# Patient Record
Sex: MALE | Race: WHITE | Employment: OTHER | URBAN - NONMETROPOLITAN AREA
[De-identification: names, ages, dates, MRNs, and addresses within clinical notes are randomized per-mention and may not be internally consistent; named-entity substitution may affect disease eponyms.]

---

## 2017-04-07 ENCOUNTER — HOSPITAL ENCOUNTER (OUTPATIENT)
Dept: GENERAL RADIOLOGY | Age: 47
Discharge: HOME | End: 2017-04-07
Payer: COMMERCIAL

## 2017-04-07 DIAGNOSIS — M54.2 CERVICAL PAIN: ICD-10-CM

## 2017-04-07 PROCEDURE — 72040 X-RAY EXAM NECK SPINE 2-3 VW: CPT

## 2017-06-29 DIAGNOSIS — R52 PAIN: Primary | ICD-10-CM

## 2017-06-29 RX ORDER — PROPRANOLOL HYDROCHLORIDE 10 MG/1
TABLET ORAL
Qty: 90 TABLET | Refills: 5 | Status: SHIPPED | OUTPATIENT
Start: 2017-06-29 | End: 2017-08-11 | Stop reason: SDUPTHER

## 2017-06-29 RX ORDER — HYDROCODONE BITARTRATE AND ACETAMINOPHEN 10; 325 MG/1; MG/1
1 TABLET ORAL EVERY 6 HOURS PRN
Qty: 75 TABLET | Refills: 0 | Status: SHIPPED | OUTPATIENT
Start: 2017-06-29 | End: 2017-07-31 | Stop reason: SDUPTHER

## 2017-06-29 RX ORDER — PROMETHAZINE HYDROCHLORIDE 25 MG/1
TABLET ORAL
Qty: 12 TABLET | Refills: 3 | Status: SHIPPED | OUTPATIENT
Start: 2017-06-29 | End: 2017-08-11 | Stop reason: SDUPTHER

## 2017-07-17 RX ORDER — DIAZEPAM 10 MG/1
1 TABLET ORAL DAILY
Refills: 2 | COMMUNITY
Start: 2017-06-14 | End: 2017-07-17 | Stop reason: SDUPTHER

## 2017-07-17 RX ORDER — DIAZEPAM 10 MG/1
10 TABLET ORAL DAILY
Qty: 30 TABLET | Refills: 2 | Status: SHIPPED | OUTPATIENT
Start: 2017-07-17 | End: 2017-08-11 | Stop reason: SDUPTHER

## 2017-07-31 ENCOUNTER — OFFICE VISIT (OUTPATIENT)
Dept: URGENT CARE | Age: 47
End: 2017-07-31
Payer: COMMERCIAL

## 2017-07-31 VITALS
SYSTOLIC BLOOD PRESSURE: 136 MMHG | OXYGEN SATURATION: 99 % | HEIGHT: 72 IN | RESPIRATION RATE: 16 BRPM | BODY MASS INDEX: 20.59 KG/M2 | WEIGHT: 152 LBS | HEART RATE: 76 BPM | TEMPERATURE: 97.8 F | DIASTOLIC BLOOD PRESSURE: 77 MMHG

## 2017-07-31 DIAGNOSIS — M54.50 ACUTE BILATERAL LOW BACK PAIN WITHOUT SCIATICA: Primary | ICD-10-CM

## 2017-07-31 LAB
ALBUMIN SERPL-MCNC: 4 G/DL (ref 3.5–5.2)
ALP BLD-CCNC: 50 U/L (ref 40–130)
ALT SERPL-CCNC: 23 U/L (ref 5–41)
ANION GAP SERPL CALCULATED.3IONS-SCNC: 13 MMOL/L (ref 7–19)
AST SERPL-CCNC: 32 U/L (ref 5–40)
BILIRUB SERPL-MCNC: 0.5 MG/DL (ref 0.2–1.2)
BUN BLDV-MCNC: 11 MG/DL (ref 6–20)
CALCIUM SERPL-MCNC: 9.2 MG/DL (ref 8.6–10)
CHLORIDE BLD-SCNC: 102 MMOL/L (ref 98–111)
CO2: 27 MMOL/L (ref 22–29)
CREAT SERPL-MCNC: 0.9 MG/DL (ref 0.5–1.2)
GFR NON-AFRICAN AMERICAN: >60
GLUCOSE BLD-MCNC: 98 MG/DL (ref 74–109)
POTASSIUM SERPL-SCNC: 4.3 MMOL/L (ref 3.5–5)
SODIUM BLD-SCNC: 142 MMOL/L (ref 136–145)
TOTAL PROTEIN: 6.8 G/DL (ref 6.6–8.7)

## 2017-07-31 PROCEDURE — 96372 THER/PROPH/DIAG INJ SC/IM: CPT | Performed by: NURSE PRACTITIONER

## 2017-07-31 PROCEDURE — 99213 OFFICE O/P EST LOW 20 MIN: CPT | Performed by: NURSE PRACTITIONER

## 2017-07-31 RX ORDER — KETOROLAC TROMETHAMINE 30 MG/ML
60 INJECTION, SOLUTION INTRAMUSCULAR; INTRAVENOUS ONCE
Status: COMPLETED | OUTPATIENT
Start: 2017-07-31 | End: 2017-07-31

## 2017-07-31 RX ORDER — HYDROCODONE BITARTRATE AND ACETAMINOPHEN 10; 325 MG/1; MG/1
1 TABLET ORAL EVERY 6 HOURS PRN
Qty: 75 TABLET | Refills: 0 | Status: SHIPPED | OUTPATIENT
Start: 2017-07-31 | End: 2017-08-02 | Stop reason: SDUPTHER

## 2017-07-31 RX ORDER — CYCLOBENZAPRINE HCL 5 MG
5 TABLET ORAL 3 TIMES DAILY PRN
Qty: 15 TABLET | Refills: 0 | Status: SHIPPED | OUTPATIENT
Start: 2017-07-31 | End: 2017-08-10

## 2017-07-31 RX ORDER — METHYLPREDNISOLONE 4 MG/1
TABLET ORAL
Qty: 1 KIT | Refills: 0 | Status: SHIPPED | OUTPATIENT
Start: 2017-08-01 | End: 2017-08-06

## 2017-07-31 RX ADMIN — KETOROLAC TROMETHAMINE 60 MG: 30 INJECTION, SOLUTION INTRAMUSCULAR; INTRAVENOUS at 10:24

## 2017-07-31 ASSESSMENT — ENCOUNTER SYMPTOMS: BACK PAIN: 1

## 2017-08-02 RX ORDER — HYDROCODONE BITARTRATE AND ACETAMINOPHEN 10; 325 MG/1; MG/1
1 TABLET ORAL EVERY 6 HOURS PRN
Qty: 75 TABLET | Refills: 0 | Status: SHIPPED | OUTPATIENT
Start: 2017-08-02 | End: 2017-08-11 | Stop reason: SDUPTHER

## 2017-08-11 ENCOUNTER — OFFICE VISIT (OUTPATIENT)
Dept: INTERNAL MEDICINE | Age: 47
End: 2017-08-11
Payer: COMMERCIAL

## 2017-08-11 VITALS
OXYGEN SATURATION: 98 % | HEIGHT: 72 IN | WEIGHT: 145 LBS | HEART RATE: 75 BPM | SYSTOLIC BLOOD PRESSURE: 102 MMHG | DIASTOLIC BLOOD PRESSURE: 66 MMHG | BODY MASS INDEX: 19.64 KG/M2

## 2017-08-11 DIAGNOSIS — R25.1 TREMOR: ICD-10-CM

## 2017-08-11 DIAGNOSIS — M54.50 CHRONIC MIDLINE LOW BACK PAIN WITHOUT SCIATICA: Primary | ICD-10-CM

## 2017-08-11 DIAGNOSIS — F41.9 ANXIETY: ICD-10-CM

## 2017-08-11 DIAGNOSIS — G89.29 CHRONIC MIDLINE LOW BACK PAIN WITHOUT SCIATICA: Primary | ICD-10-CM

## 2017-08-11 PROCEDURE — 99214 OFFICE O/P EST MOD 30 MIN: CPT | Performed by: INTERNAL MEDICINE

## 2017-08-11 RX ORDER — HYDROCODONE BITARTRATE AND ACETAMINOPHEN 10; 325 MG/1; MG/1
1 TABLET ORAL EVERY 6 HOURS PRN
Qty: 75 TABLET | Refills: 0 | Status: SHIPPED | OUTPATIENT
Start: 2017-08-11 | End: 2017-10-03 | Stop reason: SDUPTHER

## 2017-08-11 RX ORDER — DIAZEPAM 10 MG/1
10 TABLET ORAL DAILY
Qty: 30 TABLET | Refills: 2 | Status: SHIPPED | OUTPATIENT
Start: 2017-08-11 | End: 2017-12-11 | Stop reason: SDUPTHER

## 2017-08-11 RX ORDER — PROMETHAZINE HYDROCHLORIDE 25 MG/1
25 TABLET ORAL EVERY 6 HOURS PRN
Qty: 30 TABLET | Refills: 3 | Status: SHIPPED | OUTPATIENT
Start: 2017-08-11 | End: 2017-12-15 | Stop reason: SDUPTHER

## 2017-08-11 RX ORDER — PROPRANOLOL HYDROCHLORIDE 20 MG/1
20 TABLET ORAL 2 TIMES DAILY
Qty: 180 TABLET | Refills: 1 | Status: SHIPPED | OUTPATIENT
Start: 2017-08-11 | End: 2017-12-15 | Stop reason: SDUPTHER

## 2017-08-11 ASSESSMENT — PATIENT HEALTH QUESTIONNAIRE - PHQ9
SUM OF ALL RESPONSES TO PHQ9 QUESTIONS 1 & 2: 0
SUM OF ALL RESPONSES TO PHQ QUESTIONS 1-9: 0
2. FEELING DOWN, DEPRESSED OR HOPELESS: 0
1. LITTLE INTEREST OR PLEASURE IN DOING THINGS: 0

## 2017-08-19 ASSESSMENT — ENCOUNTER SYMPTOMS
SORE THROAT: 0
CONSTIPATION: 0
RHINORRHEA: 0
EYE ITCHING: 0
BLOOD IN STOOL: 0
ABDOMINAL DISTENTION: 0
VOICE CHANGE: 0
NAUSEA: 0
EYE PAIN: 0
PHOTOPHOBIA: 0
EYE DISCHARGE: 0
COLOR CHANGE: 0
SINUS PRESSURE: 0
TROUBLE SWALLOWING: 0
COUGH: 0
SHORTNESS OF BREATH: 0
CHEST TIGHTNESS: 0
WHEEZING: 0
VOMITING: 0
ABDOMINAL PAIN: 0
EYE REDNESS: 0
DIARRHEA: 0
BACK PAIN: 1

## 2017-10-03 RX ORDER — HYDROCODONE BITARTRATE AND ACETAMINOPHEN 10; 325 MG/1; MG/1
1 TABLET ORAL EVERY 6 HOURS PRN
Qty: 75 TABLET | Refills: 0 | Status: SHIPPED | OUTPATIENT
Start: 2017-10-03 | End: 2017-12-27 | Stop reason: SDUPTHER

## 2017-10-03 RX ORDER — HYDROCODONE BITARTRATE AND ACETAMINOPHEN 10; 325 MG/1; MG/1
TABLET ORAL
Qty: 75 TABLET | Refills: 0
Start: 2017-10-03 | End: 2017-12-01 | Stop reason: SDUPTHER

## 2017-10-03 NOTE — TELEPHONE ENCOUNTER
Requested Prescriptions     Pending Prescriptions Disp Refills    HYDROcodone-acetaminophen (NORCO)  MG per tablet 75 tablet 0     Sig: Take 1 tablet by mouth every 6 hours as needed for Pain (2.5 pills daily) .    TERELL

## 2017-10-05 RX ORDER — HYDROCODONE BITARTRATE AND ACETAMINOPHEN 10; 325 MG/1; MG/1
TABLET ORAL
Qty: 75 TABLET | Refills: 0 | Status: SHIPPED | OUTPATIENT
Start: 2017-10-05 | End: 2017-11-02 | Stop reason: SDUPTHER

## 2017-11-02 RX ORDER — HYDROCODONE BITARTRATE AND ACETAMINOPHEN 10; 325 MG/1; MG/1
TABLET ORAL
Qty: 75 TABLET | Refills: 0 | Status: SHIPPED | OUTPATIENT
Start: 2017-11-02 | End: 2018-01-26 | Stop reason: SDUPTHER

## 2017-12-01 RX ORDER — HYDROCODONE BITARTRATE AND ACETAMINOPHEN 10; 325 MG/1; MG/1
TABLET ORAL
Qty: 75 TABLET | Refills: 0 | Status: SHIPPED | OUTPATIENT
Start: 2017-12-01 | End: 2018-03-01 | Stop reason: SDUPTHER

## 2017-12-04 ENCOUNTER — TELEPHONE (OUTPATIENT)
Dept: INTERNAL MEDICINE | Age: 47
End: 2017-12-04

## 2017-12-11 ENCOUNTER — TELEPHONE (OUTPATIENT)
Dept: INTERNAL MEDICINE | Age: 47
End: 2017-12-11

## 2017-12-11 DIAGNOSIS — R25.1 TREMOR: ICD-10-CM

## 2017-12-11 LAB
ALBUMIN SERPL-MCNC: 4.4 G/DL (ref 3.5–5.2)
ALP BLD-CCNC: 45 U/L (ref 40–130)
ALT SERPL-CCNC: 20 U/L (ref 5–41)
ANION GAP SERPL CALCULATED.3IONS-SCNC: 12 MMOL/L (ref 7–19)
AST SERPL-CCNC: 34 U/L (ref 5–40)
BASOPHILS ABSOLUTE: 0.1 K/UL (ref 0–0.2)
BASOPHILS RELATIVE PERCENT: 1 % (ref 0–1)
BILIRUB SERPL-MCNC: 0.4 MG/DL (ref 0.2–1.2)
BUN BLDV-MCNC: 23 MG/DL (ref 6–20)
CALCIUM SERPL-MCNC: 9.1 MG/DL (ref 8.6–10)
CHLORIDE BLD-SCNC: 99 MMOL/L (ref 98–111)
CHOLESTEROL, TOTAL: 121 MG/DL (ref 160–199)
CO2: 30 MMOL/L (ref 22–29)
CREAT SERPL-MCNC: 0.9 MG/DL (ref 0.5–1.2)
EOSINOPHILS ABSOLUTE: 0.2 K/UL (ref 0–0.6)
EOSINOPHILS RELATIVE PERCENT: 2.2 % (ref 0–5)
GFR NON-AFRICAN AMERICAN: >60
GLUCOSE BLD-MCNC: 78 MG/DL (ref 74–109)
HCT VFR BLD CALC: 39.6 % (ref 42–52)
HDLC SERPL-MCNC: 50 MG/DL (ref 55–121)
HEMOGLOBIN: 12.7 G/DL (ref 14–18)
LDL CHOLESTEROL CALCULATED: 55 MG/DL
LYMPHOCYTES ABSOLUTE: 1.8 K/UL (ref 1.1–4.5)
LYMPHOCYTES RELATIVE PERCENT: 24.9 % (ref 20–40)
MCH RBC QN AUTO: 29.9 PG (ref 27–31)
MCHC RBC AUTO-ENTMCNC: 32.1 G/DL (ref 33–37)
MCV RBC AUTO: 93.2 FL (ref 80–94)
MONOCYTES ABSOLUTE: 0.6 K/UL (ref 0–0.9)
MONOCYTES RELATIVE PERCENT: 8.1 % (ref 0–10)
NEUTROPHILS ABSOLUTE: 4.6 K/UL (ref 1.5–7.5)
NEUTROPHILS RELATIVE PERCENT: 63.5 % (ref 50–65)
PDW BLD-RTO: 13.3 % (ref 11.5–14.5)
PLATELET # BLD: 237 K/UL (ref 130–400)
PMV BLD AUTO: 10.3 FL (ref 9.4–12.4)
POTASSIUM SERPL-SCNC: 4.2 MMOL/L (ref 3.5–5)
PROSTATE SPECIFIC ANTIGEN: 0.95 NG/ML (ref 0–4)
RBC # BLD: 4.25 M/UL (ref 4.7–6.1)
SODIUM BLD-SCNC: 141 MMOL/L (ref 136–145)
TOTAL PROTEIN: 6.7 G/DL (ref 6.6–8.7)
TRIGL SERPL-MCNC: 81 MG/DL (ref 0–149)
TSH SERPL DL<=0.05 MIU/L-ACNC: 1.33 UIU/ML (ref 0.27–4.2)
WBC # BLD: 7.2 K/UL (ref 4.8–10.8)

## 2017-12-11 RX ORDER — DIAZEPAM 10 MG/1
10 TABLET ORAL DAILY
Qty: 30 TABLET | Refills: 2 | Status: SHIPPED | OUTPATIENT
Start: 2017-12-11 | End: 2017-12-15 | Stop reason: SDUPTHER

## 2017-12-15 ENCOUNTER — OFFICE VISIT (OUTPATIENT)
Dept: INTERNAL MEDICINE | Age: 47
End: 2017-12-15
Payer: COMMERCIAL

## 2017-12-15 VITALS
OXYGEN SATURATION: 99 % | WEIGHT: 147.5 LBS | HEART RATE: 75 BPM | HEIGHT: 72 IN | DIASTOLIC BLOOD PRESSURE: 68 MMHG | BODY MASS INDEX: 19.98 KG/M2 | SYSTOLIC BLOOD PRESSURE: 100 MMHG

## 2017-12-15 DIAGNOSIS — K21.9 GASTROESOPHAGEAL REFLUX DISEASE WITHOUT ESOPHAGITIS: ICD-10-CM

## 2017-12-15 DIAGNOSIS — Z12.11 ENCOUNTER FOR SCREENING COLONOSCOPY: ICD-10-CM

## 2017-12-15 DIAGNOSIS — M54.50 MIDLINE LOW BACK PAIN WITHOUT SCIATICA, UNSPECIFIED CHRONICITY: ICD-10-CM

## 2017-12-15 DIAGNOSIS — K58.9 IRRITABLE BOWEL SYNDROME WITHOUT DIARRHEA: ICD-10-CM

## 2017-12-15 DIAGNOSIS — Z00.00 ROUTINE HEALTH MAINTENANCE: Primary | ICD-10-CM

## 2017-12-15 DIAGNOSIS — D64.9 ANEMIA, UNSPECIFIED TYPE: ICD-10-CM

## 2017-12-15 DIAGNOSIS — R25.1 TREMOR: ICD-10-CM

## 2017-12-15 PROCEDURE — 99214 OFFICE O/P EST MOD 30 MIN: CPT | Performed by: INTERNAL MEDICINE

## 2017-12-15 RX ORDER — PROMETHAZINE HYDROCHLORIDE 25 MG/1
25 TABLET ORAL EVERY 6 HOURS PRN
Qty: 30 TABLET | Refills: 3 | Status: SHIPPED | OUTPATIENT
Start: 2017-12-15 | End: 2018-08-24 | Stop reason: SDUPTHER

## 2017-12-15 RX ORDER — DIAZEPAM 10 MG/1
10 TABLET ORAL 2 TIMES DAILY PRN
Qty: 60 TABLET | Refills: 1 | Status: SHIPPED | OUTPATIENT
Start: 2017-12-15 | End: 2018-02-15 | Stop reason: SDUPTHER

## 2017-12-15 RX ORDER — PROPRANOLOL HYDROCHLORIDE 20 MG/1
20 TABLET ORAL 2 TIMES DAILY
Qty: 180 TABLET | Refills: 1 | Status: SHIPPED | OUTPATIENT
Start: 2017-12-15 | End: 2018-08-24 | Stop reason: SDUPTHER

## 2017-12-15 ASSESSMENT — PATIENT HEALTH QUESTIONNAIRE - PHQ9
1. LITTLE INTEREST OR PLEASURE IN DOING THINGS: 0
2. FEELING DOWN, DEPRESSED OR HOPELESS: 0
SUM OF ALL RESPONSES TO PHQ9 QUESTIONS 1 & 2: 0
SUM OF ALL RESPONSES TO PHQ QUESTIONS 1-9: 0

## 2017-12-15 ASSESSMENT — ENCOUNTER SYMPTOMS
NAUSEA: 0
COLOR CHANGE: 0
COUGH: 0
VOICE CHANGE: 0
CONSTIPATION: 0
PHOTOPHOBIA: 0
EYE DISCHARGE: 0
DIARRHEA: 0
BACK PAIN: 1
SHORTNESS OF BREATH: 0
RHINORRHEA: 0
EYE PAIN: 0
ABDOMINAL DISTENTION: 0
BLOOD IN STOOL: 0
SORE THROAT: 0
ABDOMINAL PAIN: 0
WHEEZING: 0
SINUS PRESSURE: 0
CHEST TIGHTNESS: 0
TROUBLE SWALLOWING: 0
VOMITING: 0
EYE ITCHING: 0
EYE REDNESS: 0

## 2017-12-15 NOTE — PROGRESS NOTES
Chief Complaint   Patient presents with    Annual Exam       HPI: Wes James is a 50 y.o. male is here for His annual physical exam.    His functional status is good. He denies any history of falls. He has no concerns related to safety, hearing, or cognition. His major concern today is a history of essential tremor. He is now working for a [employer]. He is a  and is very intricate work. He states that the tremor does affect his ability to work. He notices that when he takes his Valium along with the propanolol, the shakiness completely resolves. He normally takes his diet just once a day for anxiety. However, he has been taking it twice a day secondary to the tremor. He wants to know if he could have an increase in the Valium. His anxiety is well controlled. He does have a history of chronic low back pain. His medications are working well for the back pain. His cholesterol is good at 121. He is slightly anemic on his lab work today. Educations are working well for acid reflux. He denies any history of blood in the stools. He is on domperidone for history of IBS. Routine screening is as follows:  Vision exam yearly  Declines a flu vaccine  PSA yearly  He will turn 48 in March.  We will refer him for colonoscopy    Past Medical History:   Diagnosis Date    Allergic rhinitis     Cellulitis     Gastroparesis     GERD (gastroesophageal reflux disease)       Past Surgical History:   Procedure Laterality Date    CHOLECYSTECTOMY        Social History     Social History    Marital status:      Spouse name: N/A    Number of children: N/A    Years of education: N/A     Social History Main Topics    Smoking status: Former Smoker     Types: Cigarettes     Quit date: 12/15/1990    Smokeless tobacco: Never Used    Alcohol use No    Drug use: No    Sexual activity: Not on file     Other Topics Concern    Not on file     Social History Narrative    No narrative on file      No family history pain, discharge, redness, itching and visual disturbance. Respiratory: Negative for cough, chest tightness, shortness of breath and wheezing. Cardiovascular: Negative for chest pain, palpitations and leg swelling. Gastrointestinal: Negative for abdominal distention, abdominal pain, blood in stool, constipation, diarrhea, nausea and vomiting. Endocrine: Negative for cold intolerance, heat intolerance, polydipsia, polyphagia and polyuria. Genitourinary: Negative for difficulty urinating, dysuria, enuresis, frequency, hematuria and urgency. Musculoskeletal: Positive for back pain and neck pain. Chronic low back and neck pain   Skin: Negative for color change, pallor, rash and wound. Allergic/Immunologic: Negative for environmental allergies, food allergies and immunocompromised state. Neurological: Positive for tremors. Negative for dizziness, syncope, speech difficulty, weakness, light-headedness, numbness and headaches. Psychiatric/Behavioral: Negative for agitation, behavioral problems, confusion, decreased concentration, dysphoric mood, hallucinations, self-injury, sleep disturbance and suicidal ideas. The patient is nervous/anxious. The patient is not hyperactive. Anxiety is stable       /68   Pulse 75   Ht 6' (1.829 m)   Wt 147 lb 8 oz (66.9 kg)   SpO2 99%   BMI 20.00 kg/m²   Physical Exam   Constitutional: He is oriented to person, place, and time. He appears well-developed and well-nourished. No distress. HENT:   Head: Normocephalic and atraumatic. Right Ear: External ear normal.   Left Ear: External ear normal.   Nose: Nose normal.   Mouth/Throat: Oropharynx is clear and moist. No oropharyngeal exudate. Eyes: Conjunctivae and EOM are normal. Pupils are equal, round, and reactive to light. Right eye exhibits no discharge. Left eye exhibits no discharge. No scleral icterus. Neck: Normal range of motion. Neck supple. No JVD present.  No tracheal deviation present. No thyromegaly present. Cardiovascular: Normal rate, regular rhythm, normal heart sounds and intact distal pulses. Exam reveals no gallop and no friction rub. No murmur heard. Pulmonary/Chest: Effort normal and breath sounds normal. No respiratory distress. He has no wheezes. He has no rales. He exhibits no tenderness. Abdominal: Soft. Bowel sounds are normal. He exhibits no distension and no mass. There is no tenderness. There is no rebound and no guarding. Musculoskeletal: Normal range of motion. He exhibits tenderness. He exhibits no edema or deformity. Gait antalgic. There is tenderness over the lower lumbar spine   Lymphadenopathy:     He has no cervical adenopathy. Neurological: He is alert and oriented to person, place, and time. He has normal reflexes. He displays normal reflexes. No cranial nerve deficit. He exhibits normal muscle tone. Coordination normal.   Tremor to hands noted   Skin: Skin is warm and dry. No rash noted. He is not diaphoretic. No erythema. No pallor. Psychiatric: He has a normal mood and affect. His behavior is normal. Judgment and thought content normal.   Nursing note and vitals reviewed. 1. Encounter for screening colonoscopy    2. Anemia, unspecified type        ASSESSMENT/PLAN:    64-year-old male here for annual physical exam    1. Health maintenance: Routine screening is as per HPI. Labs were discussed with patient today. 2. Tremor: Continue Inderal. We can actually increase the Valium to 10 mg twice daily. He states that the Valium does not make him sleepy or interfere with his work. 3. Chronic low back pain: Continue hydrocodone as prescribed    4. GERD: Stable on omeprazole and Pepcid    5. IBS: Continue domperidone    6. Anemia: Uncertain etiology. Check CBC and iron studies in a month.     Karina Angelo was seen today for annual exam.    Diagnoses and all orders for this visit:    Encounter for screening colonoscopy  -     Ambulatory referral to Gastroenterology    Anemia, unspecified type  -     CBC; Future  -     Iron and TIBC; Future    Other orders  -     promethazine (PHENERGAN) 25 MG tablet; Take 1 tablet by mouth every 6 hours as needed for Nausea  -     propranolol (INDERAL) 20 MG tablet; Take 1 tablet by mouth 2 times daily  -     diazepam (VALIUM) 10 MG tablet; Take 1 tablet by mouth 2 times daily as needed for Anxiety . Return in about 4 months (around 4/15/2018) for medication check. Orders Placed This Encounter   Procedures    CBC     Standing Status:   Future     Standing Expiration Date:   2/15/2018    Iron and TIBC     Standing Status:   Future     Standing Expiration Date:   2/15/2018     Order Specific Question:   Is Patient Fasting? Answer:   yes     Order Specific Question:   No of Hours?      Answer:   15    Ambulatory referral to Gastroenterology     Referral Priority:   Routine     Referral Type:   Consult for Advice and Opinion     Requested Specialty:   Gastroenterology     Number of Visits Requested:   Jaja Sauceda MD

## 2017-12-15 NOTE — PATIENT INSTRUCTIONS
hours after meals. But you may need to take iron with food to avoid an upset stomach. ¨ Do not take antacids or drink milk or caffeine drinks (such as coffee, tea, or cola) at the same time or within 2 hours of the time that you take your iron. They can make it hard for your body to absorb the iron. ¨ Vitamin C (from food or supplements) helps your body absorb iron. Try taking iron pills with a glass of orange juice or some other food that is high in vitamin C, such as citrus fruits. ¨ Iron pills may cause stomach problems, such as heartburn, nausea, diarrhea, constipation, and cramps. Be sure to drink plenty of fluids, and include fruits, vegetables, and fiber in your diet each day. Iron pills often make your bowel movements dark or green. ¨ If you forget to take an iron pill, do not take a double dose of iron the next time you take a pill. ¨ Keep iron pills out of the reach of small children. An overdose of iron can be very dangerous. · Follow your doctor's advice about eating iron-rich foods. These include red meat, shellfish, poultry, eggs, beans, raisins, whole-grain bread, and leafy green vegetables. · Steam vegetables to help them keep their iron content. When should you call for help? Call 911 anytime you think you may need emergency care. For example, call if:  ? · You have symptoms of a heart attack. These may include:  ¨ Chest pain or pressure, or a strange feeling in the chest.  ¨ Sweating. ¨ Shortness of breath. ¨ Nausea or vomiting. ¨ Pain, pressure, or a strange feeling in the back, neck, jaw, or upper belly or in one or both shoulders or arms. ¨ Lightheadedness or sudden weakness. ¨ A fast or irregular heartbeat. After you call 911, the  may tell you to chew 1 adult-strength or 2 to 4 low-dose aspirin. Wait for an ambulance. Do not try to drive yourself. ? · You passed out (lost consciousness).    ?Call your doctor now or seek immediate medical care if:  ? · You have new or

## 2018-01-26 RX ORDER — HYDROCODONE BITARTRATE AND ACETAMINOPHEN 10; 325 MG/1; MG/1
TABLET ORAL
Qty: 75 TABLET | Refills: 0 | Status: SHIPPED | OUTPATIENT
Start: 2018-01-26 | End: 2019-11-19 | Stop reason: SDUPTHER

## 2018-02-15 RX ORDER — DIAZEPAM 10 MG/1
10 TABLET ORAL 2 TIMES DAILY PRN
Qty: 60 TABLET | Refills: 1 | Status: SHIPPED | OUTPATIENT
Start: 2018-02-15 | End: 2018-04-16 | Stop reason: SDUPTHER

## 2018-03-01 RX ORDER — HYDROCODONE BITARTRATE AND ACETAMINOPHEN 10; 325 MG/1; MG/1
TABLET ORAL
Qty: 90 TABLET | Refills: 0 | Status: SHIPPED | OUTPATIENT
Start: 2018-03-01 | End: 2018-04-20 | Stop reason: SDUPTHER

## 2018-03-23 RX ORDER — HYDROCODONE BITARTRATE AND ACETAMINOPHEN 10; 325 MG/1; MG/1
1 TABLET ORAL EVERY 6 HOURS PRN
Qty: 75 TABLET | Refills: 0 | Status: SHIPPED | OUTPATIENT
Start: 2018-03-23 | End: 2018-05-23 | Stop reason: SDUPTHER

## 2018-04-17 RX ORDER — DIAZEPAM 10 MG/1
10 TABLET ORAL 2 TIMES DAILY PRN
Qty: 60 TABLET | Refills: 1 | Status: SHIPPED | OUTPATIENT
Start: 2018-04-17 | End: 2018-04-20 | Stop reason: SDUPTHER

## 2018-04-17 RX ORDER — HYDROCODONE BITARTRATE AND ACETAMINOPHEN 10; 325 MG/1; MG/1
TABLET ORAL
Qty: 20 TABLET | Refills: 0 | Status: SHIPPED | OUTPATIENT
Start: 2018-04-17 | End: 2019-11-19 | Stop reason: SDUPTHER

## 2018-04-20 ENCOUNTER — OFFICE VISIT (OUTPATIENT)
Dept: INTERNAL MEDICINE | Age: 48
End: 2018-04-20
Payer: COMMERCIAL

## 2018-04-20 VITALS
OXYGEN SATURATION: 99 % | DIASTOLIC BLOOD PRESSURE: 82 MMHG | BODY MASS INDEX: 19.57 KG/M2 | HEIGHT: 72 IN | SYSTOLIC BLOOD PRESSURE: 138 MMHG | WEIGHT: 144.5 LBS | HEART RATE: 71 BPM

## 2018-04-20 DIAGNOSIS — H61.21 IMPACTED CERUMEN OF RIGHT EAR: ICD-10-CM

## 2018-04-20 DIAGNOSIS — R25.1 TREMOR: ICD-10-CM

## 2018-04-20 DIAGNOSIS — Z12.11 ENCOUNTER FOR SCREENING COLONOSCOPY: Primary | ICD-10-CM

## 2018-04-20 DIAGNOSIS — K21.9 GASTROESOPHAGEAL REFLUX DISEASE WITHOUT ESOPHAGITIS: ICD-10-CM

## 2018-04-20 DIAGNOSIS — M54.9 BACK PAIN, UNSPECIFIED BACK LOCATION, UNSPECIFIED BACK PAIN LATERALITY, UNSPECIFIED CHRONICITY: ICD-10-CM

## 2018-04-20 PROCEDURE — 99214 OFFICE O/P EST MOD 30 MIN: CPT | Performed by: INTERNAL MEDICINE

## 2018-04-20 RX ORDER — HYDROCODONE BITARTRATE AND ACETAMINOPHEN 10; 325 MG/1; MG/1
TABLET ORAL
Qty: 90 TABLET | Refills: 0 | Status: SHIPPED | OUTPATIENT
Start: 2018-04-20 | End: 2018-07-20 | Stop reason: SDUPTHER

## 2018-04-20 RX ORDER — DIAZEPAM 10 MG/1
10 TABLET ORAL 2 TIMES DAILY PRN
Qty: 60 TABLET | Refills: 1 | Status: SHIPPED | OUTPATIENT
Start: 2018-04-20 | End: 2018-06-13 | Stop reason: SDUPTHER

## 2018-04-23 ASSESSMENT — ENCOUNTER SYMPTOMS
WHEEZING: 0
VOICE CHANGE: 0
BACK PAIN: 1
ABDOMINAL PAIN: 0
SHORTNESS OF BREATH: 0
TROUBLE SWALLOWING: 0
SINUS PRESSURE: 0
CHEST TIGHTNESS: 0
EYE DISCHARGE: 0
EYE PAIN: 0
COUGH: 0
CONSTIPATION: 0
DIARRHEA: 0
EYE REDNESS: 0
SORE THROAT: 0
PHOTOPHOBIA: 0
BLOOD IN STOOL: 0
NAUSEA: 0
COLOR CHANGE: 0
ABDOMINAL DISTENTION: 0
RHINORRHEA: 0
EYE ITCHING: 0
VOMITING: 0

## 2018-04-26 ENCOUNTER — TELEPHONE (OUTPATIENT)
Dept: GASTROENTEROLOGY | Age: 48
End: 2018-04-26

## 2018-05-24 RX ORDER — HYDROCODONE BITARTRATE AND ACETAMINOPHEN 10; 325 MG/1; MG/1
1 TABLET ORAL EVERY 6 HOURS PRN
Qty: 75 TABLET | Refills: 0 | Status: SHIPPED | OUTPATIENT
Start: 2018-05-24 | End: 2018-06-18 | Stop reason: SDUPTHER

## 2018-06-13 DIAGNOSIS — F41.9 ANXIETY: Primary | ICD-10-CM

## 2018-06-13 RX ORDER — DIAZEPAM 10 MG/1
10 TABLET ORAL 2 TIMES DAILY PRN
Qty: 60 TABLET | Refills: 1 | Status: SHIPPED | OUTPATIENT
Start: 2018-06-13 | End: 2018-08-10 | Stop reason: SDUPTHER

## 2018-06-18 DIAGNOSIS — M54.5 LOW BACK PAIN, UNSPECIFIED BACK PAIN LATERALITY, UNSPECIFIED CHRONICITY, WITH SCIATICA PRESENCE UNSPECIFIED: Primary | ICD-10-CM

## 2018-06-18 RX ORDER — HYDROCODONE BITARTRATE AND ACETAMINOPHEN 10; 325 MG/1; MG/1
1 TABLET ORAL EVERY 6 HOURS PRN
Qty: 75 TABLET | Refills: 0 | Status: SHIPPED | OUTPATIENT
Start: 2018-06-18 | End: 2018-08-24 | Stop reason: SDUPTHER

## 2018-06-18 NOTE — TELEPHONE ENCOUNTER
Keena Timmons called requesting a refill of the below medication which has been pended for you:     Requested Prescriptions     Pending Prescriptions Disp Refills    HYDROcodone-acetaminophen (NORCO)  MG per tablet 75 tablet 0     Sig: Take 1 tablet by mouth every 6 hours as needed for Pain (2.5 pills daily). .       Last Appointment Date: 4/20/2018  Next Appointment Date: 8/24/2018    No Known Allergies

## 2018-07-20 ENCOUNTER — TELEPHONE (OUTPATIENT)
Dept: INTERNAL MEDICINE | Age: 48
End: 2018-07-20

## 2018-07-20 DIAGNOSIS — G89.29 OTHER CHRONIC PAIN: Primary | ICD-10-CM

## 2018-07-20 RX ORDER — HYDROCODONE BITARTRATE AND ACETAMINOPHEN 10; 325 MG/1; MG/1
TABLET ORAL
Qty: 90 TABLET | Refills: 0 | Status: SHIPPED | OUTPATIENT
Start: 2018-07-20 | End: 2018-09-12

## 2018-08-01 ENCOUNTER — ANESTHESIA EVENT (OUTPATIENT)
Dept: ENDOSCOPY | Age: 48
End: 2018-08-01
Payer: COMMERCIAL

## 2018-08-02 ENCOUNTER — HOSPITAL ENCOUNTER (OUTPATIENT)
Age: 48
Setting detail: OUTPATIENT SURGERY
Discharge: HOME | End: 2018-08-02
Attending: INTERNAL MEDICINE | Admitting: INTERNAL MEDICINE
Payer: COMMERCIAL

## 2018-08-02 ENCOUNTER — ANESTHESIA (OUTPATIENT)
Dept: ENDOSCOPY | Age: 48
End: 2018-08-02
Payer: COMMERCIAL

## 2018-08-02 VITALS
DIASTOLIC BLOOD PRESSURE: 53 MMHG | RESPIRATION RATE: 37 BRPM | SYSTOLIC BLOOD PRESSURE: 91 MMHG | OXYGEN SATURATION: 99 %

## 2018-08-02 VITALS
WEIGHT: 150 LBS | SYSTOLIC BLOOD PRESSURE: 100 MMHG | HEIGHT: 60 IN | BODY MASS INDEX: 29.45 KG/M2 | OXYGEN SATURATION: 99 % | HEART RATE: 88 BPM | TEMPERATURE: 97.5 F | RESPIRATION RATE: 16 BRPM | DIASTOLIC BLOOD PRESSURE: 56 MMHG

## 2018-08-02 PROCEDURE — 2580000003 HC RX 258: Performed by: INTERNAL MEDICINE

## 2018-08-02 PROCEDURE — 3609009500 HC COLONOSCOPY DIAGNOSTIC OR SCREENING: Performed by: INTERNAL MEDICINE

## 2018-08-02 PROCEDURE — 45378 DIAGNOSTIC COLONOSCOPY: CPT | Performed by: INTERNAL MEDICINE

## 2018-08-02 PROCEDURE — 7100000011 HC PHASE II RECOVERY - ADDTL 15 MIN: Performed by: INTERNAL MEDICINE

## 2018-08-02 PROCEDURE — 3700000001 HC ADD 15 MINUTES (ANESTHESIA): Performed by: INTERNAL MEDICINE

## 2018-08-02 PROCEDURE — 3700000000 HC ANESTHESIA ATTENDED CARE: Performed by: INTERNAL MEDICINE

## 2018-08-02 PROCEDURE — 6360000002 HC RX W HCPCS: Performed by: NURSE ANESTHETIST, CERTIFIED REGISTERED

## 2018-08-02 PROCEDURE — 2500000003 HC RX 250 WO HCPCS: Performed by: NURSE ANESTHETIST, CERTIFIED REGISTERED

## 2018-08-02 PROCEDURE — 7100000010 HC PHASE II RECOVERY - FIRST 15 MIN: Performed by: INTERNAL MEDICINE

## 2018-08-02 RX ORDER — LIDOCAINE HYDROCHLORIDE 10 MG/ML
1 INJECTION, SOLUTION EPIDURAL; INFILTRATION; INTRACAUDAL; PERINEURAL ONCE
Status: DISCONTINUED | OUTPATIENT
Start: 2018-08-02 | End: 2018-08-02 | Stop reason: HOSPADM

## 2018-08-02 RX ORDER — LIDOCAINE HYDROCHLORIDE 20 MG/ML
INJECTION, SOLUTION INFILTRATION; PERINEURAL PRN
Status: DISCONTINUED | OUTPATIENT
Start: 2018-08-02 | End: 2018-08-02 | Stop reason: SDUPTHER

## 2018-08-02 RX ORDER — FENTANYL CITRATE 50 UG/ML
INJECTION, SOLUTION INTRAMUSCULAR; INTRAVENOUS PRN
Status: DISCONTINUED | OUTPATIENT
Start: 2018-08-02 | End: 2018-08-02 | Stop reason: SDUPTHER

## 2018-08-02 RX ORDER — SODIUM CHLORIDE, SODIUM LACTATE, POTASSIUM CHLORIDE, CALCIUM CHLORIDE 600; 310; 30; 20 MG/100ML; MG/100ML; MG/100ML; MG/100ML
INJECTION, SOLUTION INTRAVENOUS CONTINUOUS
Status: DISCONTINUED | OUTPATIENT
Start: 2018-08-02 | End: 2018-08-02 | Stop reason: HOSPADM

## 2018-08-02 RX ORDER — MIDAZOLAM HYDROCHLORIDE 1 MG/ML
INJECTION INTRAMUSCULAR; INTRAVENOUS PRN
Status: DISCONTINUED | OUTPATIENT
Start: 2018-08-02 | End: 2018-08-02 | Stop reason: SDUPTHER

## 2018-08-02 RX ORDER — PROPOFOL 10 MG/ML
INJECTION, EMULSION INTRAVENOUS PRN
Status: DISCONTINUED | OUTPATIENT
Start: 2018-08-02 | End: 2018-08-02 | Stop reason: SDUPTHER

## 2018-08-02 RX ADMIN — SODIUM CHLORIDE, POTASSIUM CHLORIDE, SODIUM LACTATE AND CALCIUM CHLORIDE: 600; 310; 30; 20 INJECTION, SOLUTION INTRAVENOUS at 10:10

## 2018-08-02 RX ADMIN — PROPOFOL 200 MG: 10 INJECTION, EMULSION INTRAVENOUS at 10:45

## 2018-08-02 RX ADMIN — MIDAZOLAM 2 MG: 1 INJECTION INTRAMUSCULAR; INTRAVENOUS at 10:42

## 2018-08-02 RX ADMIN — FENTANYL CITRATE 50 MCG: 50 INJECTION INTRAMUSCULAR; INTRAVENOUS at 10:45

## 2018-08-02 RX ADMIN — LIDOCAINE HYDROCHLORIDE 40 MG: 20 INJECTION, SOLUTION INFILTRATION; PERINEURAL at 10:45

## 2018-08-02 ASSESSMENT — PAIN - FUNCTIONAL ASSESSMENT: PAIN_FUNCTIONAL_ASSESSMENT: 0-10

## 2018-08-02 NOTE — ANESTHESIA PRE PROCEDURE
Department of Anesthesiology  Preprocedure Note       Name:  Daniel Soares   Age:  46 y.o.  :  1970                                          MRN:  [id number]         Date:  2018      Surgeon: Jared Quarles):  Raji Yanez MD    Procedure: Procedure(s):  COLONOSCOPY DIAGNOSTIC OR SCREENING    Medications prior to admission:   Prior to Admission medications    Medication Sig Start Date End Date Taking? Authorizing Provider   HYDROcodone-acetaminophen (NORCO)  MG per tablet Take 1 tablet by mouth every 6 hours as needed for Pain (2.5 pills daily). Hany Oliver Date: 18 Yes Leno Walters MD   diazepam (VALIUM) 10 MG tablet Take 1 tablet by mouth 2 times daily as needed for Anxiety. . 18 Yes Leno Walters MD   promethazine (PHENERGAN) 25 MG tablet Take 1 tablet by mouth every 6 hours as needed for Nausea 12/15/17  Yes Leno Walters MD   propranolol (INDERAL) 20 MG tablet Take 1 tablet by mouth 2 times daily 12/15/17  Yes Leno Walters MD   omeprazole (PRILOSEC) 20 MG capsule Take 20 mg by mouth 2 times daily. Yes Historical Provider, MD   HYDROcodone-acetaminophen (NORCO)  MG per tablet TAKE 1 TABLET BY MOUTH EVERY 6 HOURS AS NEEDED FOR PAIN (MAX OF 2 AND 1/2 PILLS DAILY) . Weston Afu[place]yla Earliest Fill Date: 18  Leno Walters MD   PROPRANOLOL HCL by Does not apply route. Historical Provider, MD   loratadine (CLARITIN) 10 MG tablet Take 10 mg by mouth daily. Historical Provider, MD   Multiple Vitamin (MULTI-VITAMIN PO) Take  by mouth.       Historical Provider, MD       Current medications:    Current Facility-Administered Medications   Medication Dose Route Frequency Provider Last Rate Last Dose    lactated ringers infusion   Intravenous Continuous Raji Yanez  mL/hr at 18 1010      lidocaine PF 1 % injection 1 mL  1 mL Intradermal Once Raji Yanez MD           Allergies:  No Known Allergies    Problem List: There is no problem list on file for this patient. Past Medical History:        Diagnosis Date    Allergic rhinitis     Cellulitis     Gastroparesis     GERD (gastroesophageal reflux disease)        Past Surgical History:        Procedure Laterality Date    CHOLECYSTECTOMY         Social History:    Social History   Substance Use Topics    Smoking status: Former Smoker     Types: Cigarettes     Quit date: 12/15/1990    Smokeless tobacco: Never Used    Alcohol use No                                Counseling given: Not Answered      Vital Signs (Current):   Vitals:    08/02/18 1001   BP: 96/62   Pulse: 101   Resp: 18   Temp: 98.4 °F (36.9 °C)   TempSrc: Temporal   SpO2: 99%   Weight: 150 lb (68 kg)   Height: (!) 6\" (0.152 m)                                              BP Readings from Last 3 Encounters:   08/02/18 96/62   04/20/18 138/82   12/15/17 100/68       NPO Status: Time of last liquid consumption: 0700                        Time of last solid consumption: 1800                        Date of last liquid consumption: 08/02/18                        Date of last solid food consumption: 07/31/18    BMI:   Wt Readings from Last 3 Encounters:   08/02/18 150 lb (68 kg)   04/20/18 144 lb 8 oz (65.5 kg)   12/15/17 147 lb 8 oz (66.9 kg)     Body mass index is 2,929.49 kg/m².     CBC:   Lab Results   Component Value Date    WBC 7.2 12/11/2017    RBC 4.25 12/11/2017    HGB 12.7 12/11/2017    HCT 39.6 12/11/2017    MCV 93.2 12/11/2017    RDW 13.3 12/11/2017     12/11/2017       CMP:   Lab Results   Component Value Date     12/11/2017    K 4.2 12/11/2017    CL 99 12/11/2017    CO2 30 12/11/2017    BUN 23 12/11/2017    CREATININE 0.9 12/11/2017    LABGLOM >60 12/11/2017    GLUCOSE 78 12/11/2017    PROT 6.7 12/11/2017    CALCIUM 9.1 12/11/2017    BILITOT 0.4 12/11/2017    ALKPHOS 45 12/11/2017    AST 34 12/11/2017    ALT 20 12/11/2017       POC Tests: No results for input(s): POCGLU, POCNA, POCK, POCCL, POCBUN, POCHEMO, POCHCT in the last 72 hours. Coags: No results found for: PROTIME, INR, APTT    HCG (If Applicable): No results found for: PREGTESTUR, PREGSERUM, HCG, HCGQUANT     ABGs: No results found for: PHART, PO2ART, OGR2QIG, EYQ6NUE, BEART, F9PJXDBZ     Type & Screen (If Applicable):  No results found for: LABABO, [address]    Anesthesia Evaluation  Patient summary reviewed and Nursing notes reviewed  Airway: Mallampati: I  TM distance: >3 FB   Neck ROM: full  Mouth opening: > = 3 FB Dental:          Pulmonary:Negative Pulmonary ROS and normal exam                               Cardiovascular:Negative CV ROS  Exercise tolerance: good (>4 METS),                     Neuro/Psych:   (+) depression/anxiety              ROS comment: Essential Tremors (takes propranolol) GI/Hepatic/Renal:   (+) GERD: well controlled,           Endo/Other:                C-spine cleared    Pt had no PAT visit       Abdominal:           Vascular: negative vascular ROS. Anesthesia Plan      general     ASA 2       Induction: intravenous. Anesthetic plan and risks discussed with patient and spouse.                       Cindy Lara, APRN - CRNA   8/2/2018

## 2018-08-02 NOTE — OP NOTE
Patient: Maryanne Matthews : 1970  Med Rec#: [id number] Acc#: [id number]   Primary Care Provider Lucian Selby MD    Date of Procedure:  2018    Endoscopist: Catarino Gracia MD    Referring Provider: Lucian Selby MD,     Operation Performed: Colonoscopy     Indications: screening    Anesthesia:  Sedation was administered by anesthesia who monitored the patient during the procedure. I met with Maryanne Matthews prior to procedure. We discussed the procedure itself, and I have discussed the risks of endoscopy (including-- but not limited to-- pain, discomfort, bleeding potentially requiring second endoscopic procedure and/or blood transfusion, organ perforation requiring operative repair, damage to organs near the colon, infection, aspiration, cardiopulmonary/allergic reaction), benefits, indications to endoscopy. Additionally, we discussed options other than colonoscopy. The patient expressed understanding. All questions answered. The patient decided to proceed with the procedure. Signed informed consent was placed on the chart. Blood Loss: minimal    Withdrawal time: > 6 minutes  Bowel Prep: adequate     Complications: no immediate complications    DESCRIPTION OF PROCEDURE:     A time out was performed. After written informed consent was obtained, the patient was placed in the left lateral position. The perianal area was inspected, and a digital rectal exam was performed. A rectal exam was performed: normal tone, no palpable lesions. At this point, a forward viewing Olympus colonoscope was inserted into the anus and carefully advanced to the cecum. The cecum was identified by the ileocecal valve and the appendiceal orifice. The colonoscope was then slowly withdrawn with careful inspection of the mucosa in a linear and circumferential fashion. The scope was retroflexed in the rectum. Suction was utilized during the procedure to remove as much air as possible from the bowel.  The colonoscope was

## 2018-08-02 NOTE — H&P
Historical Provider, MD       Past Medical History:  Past Medical History:   Diagnosis Date    Allergic rhinitis     Cellulitis     Gastroparesis     GERD (gastroesophageal reflux disease)        Past Surgical History:  Past Surgical History:   Procedure Laterality Date    CHOLECYSTECTOMY         Social History:  Social History   Substance Use Topics    Smoking status: Former Smoker     Types: Cigarettes     Quit date: 12/15/1990    Smokeless tobacco: Never Used    Alcohol use No       Vital Signs:   Vitals:    08/02/18 1001   BP: 96/62   Pulse: 101   Resp: 18   Temp: 98.4 °F (36.9 °C)   SpO2: 99%        Physical Exam:  Cardiac:  [x]WNL  []Comments:  Pulmonary:  [x]WNL   []Comments:  Neuro/Mental Status:  [x]WNL  []Comments:  Abdominal:  [x]WNL    []Comments:  Other:   []WNL  []Comments:    Informed Consent:  The risks and benefits of the procedure have been discussed with either the patient or if they cannot consent, their representative. Assessment:  Patient examined and appropriate for planned sedation and procedure. Plan:  Proceed with planned sedation and procedure as above.     Cecil Hernandez MD

## 2018-08-10 DIAGNOSIS — F41.9 ANXIETY: ICD-10-CM

## 2018-08-10 RX ORDER — DIAZEPAM 10 MG/1
10 TABLET ORAL 2 TIMES DAILY PRN
Qty: 60 TABLET | Refills: 1 | Status: SHIPPED | OUTPATIENT
Start: 2018-08-10 | End: 2018-09-12 | Stop reason: SDUPTHER

## 2018-08-10 NOTE — TELEPHONE ENCOUNTER
Ricky Marcelo called requesting a refill of the below medication which has been pended for you:     Requested Prescriptions     Pending Prescriptions Disp Refills    diazepam (VALIUM) 10 MG tablet 60 tablet 1     Sig: Take 1 tablet by mouth 2 times daily as needed for Anxiety. .       Last Appointment Date: 4/20/2018  Next Appointment Date: 8/24/2018    No Known Allergies

## 2018-08-24 ENCOUNTER — OFFICE VISIT (OUTPATIENT)
Dept: INTERNAL MEDICINE | Age: 48
End: 2018-08-24
Payer: COMMERCIAL

## 2018-08-24 VITALS
BODY MASS INDEX: 20.25 KG/M2 | WEIGHT: 149.5 LBS | HEART RATE: 67 BPM | OXYGEN SATURATION: 100 % | SYSTOLIC BLOOD PRESSURE: 112 MMHG | DIASTOLIC BLOOD PRESSURE: 70 MMHG | HEIGHT: 72 IN

## 2018-08-24 DIAGNOSIS — K21.9 GASTROESOPHAGEAL REFLUX DISEASE WITHOUT ESOPHAGITIS: ICD-10-CM

## 2018-08-24 DIAGNOSIS — R25.1 TREMOR: ICD-10-CM

## 2018-08-24 DIAGNOSIS — Z91.09 ENVIRONMENTAL ALLERGIES: ICD-10-CM

## 2018-08-24 DIAGNOSIS — M54.5 LOW BACK PAIN, UNSPECIFIED BACK PAIN LATERALITY, UNSPECIFIED CHRONICITY, WITH SCIATICA PRESENCE UNSPECIFIED: ICD-10-CM

## 2018-08-24 DIAGNOSIS — F41.9 ANXIETY DISORDER, UNSPECIFIED TYPE: Primary | ICD-10-CM

## 2018-08-24 PROCEDURE — 99214 OFFICE O/P EST MOD 30 MIN: CPT | Performed by: INTERNAL MEDICINE

## 2018-08-24 RX ORDER — PROMETHAZINE HYDROCHLORIDE 25 MG/1
25 TABLET ORAL EVERY 6 HOURS PRN
Qty: 30 TABLET | Refills: 3 | Status: SHIPPED | OUTPATIENT
Start: 2018-08-24 | End: 2018-12-14 | Stop reason: SDUPTHER

## 2018-08-24 RX ORDER — HYDROCODONE BITARTRATE AND ACETAMINOPHEN 10; 325 MG/1; MG/1
1 TABLET ORAL EVERY 6 HOURS PRN
Qty: 90 TABLET | Refills: 0 | Status: SHIPPED | OUTPATIENT
Start: 2018-08-24 | End: 2018-09-12 | Stop reason: SDUPTHER

## 2018-08-24 RX ORDER — PROPRANOLOL HYDROCHLORIDE 40 MG/1
40 TABLET ORAL 2 TIMES DAILY
Qty: 180 TABLET | Refills: 3 | Status: SHIPPED | OUTPATIENT
Start: 2018-08-24 | End: 2019-04-26 | Stop reason: SDUPTHER

## 2018-08-24 RX ORDER — PROPRANOLOL HYDROCHLORIDE 10 MG/1
TABLET ORAL
Qty: 360 TABLET | Refills: 1 | Status: SHIPPED | OUTPATIENT
Start: 2018-08-24 | End: 2018-12-18

## 2018-08-24 NOTE — PATIENT INSTRUCTIONS
least 11weeks old to treat a genetic condition called infantile hemangiomas. Hemangiomas are caused by blood vessels grouping together in an abnormal way. These blood vessels form benign (non-cancerous) growths that can develop into ulcers or red marks on the skin. Hemangiomas can also cause more serious complications inside the body (in the liver, brain, or digestive system). Propranolol may also be used for purposes not listed in this medication guide. What should I discuss with my healthcare provider before taking propranolol? You should not use propranolol if you are allergic to it, or if you have:  · asthma;  · very slow heart beats that have caused you to faint; or  · a serious heart condition such as \"sick sinus syndrome\" or \"AV block\" (unless you have a pacemaker). Babies who weigh less than 4.5 pounds should not be given Hemangeol oral liquid. To make sure propranolol is safe for you, tell your doctor if you have:  · a muscle disorder;  · bronchitis, emphysema, or other breathing disorders;  · low blood sugar, or diabetes (propranolol can make it harder for you to tell when you have low blood sugar);  · slow heartbeats, low blood pressure;  · congestive heart failure;  · depression;  · liver or kidney disease;  · a thyroid disorder;  · pheochromocytoma (tumor of the adrenal gland); or  · problems with circulation (such as Raynaud's syndrome). It is not known whether propranolol will harm an unborn baby. Tell your doctor if you are pregnant or plan to become pregnant while using this medicine. Propranolol can pass into breast milk and may harm a nursing baby. Tell your doctor if you are breast-feeding a baby. How should I take propranolol? Follow all directions on your prescription label. Your doctor may occasionally change your dose to make sure you get the best results. Do not take this medicine in larger or smaller amounts or for longer than recommended.   Adults may take propranolol with or eat, feeling cold, drowsiness, weak or shallow breathing (breathing may stop for short periods), seizure (convulsions), or loss of consciousness; or  · severe skin reaction --fever, sore throat, swelling in your face or tongue, burning in your eyes, skin pain, followed by a red or purple skin rash that spreads (especially in the face or upper body) and causes blistering and peeling. Common side effects may include:  · nausea, vomiting, diarrhea, constipation, stomach cramps;  · decreased sex drive, impotence, or difficulty having an orgasm;  · sleep problems (insomnia); or  · tired feeling. This is not a complete list of side effects and others may occur. Call your doctor for medical advice about side effects. You may report side effects to FDA at 5-827-FDA-5183. What other drugs will affect propranolol? Tell your doctor about all medicines you use, and those you start or stop using during your treatment with propranolol, especially:  · a blood thinner --warfarin, Coumadin, Jantoven;  · an antidepressant --amitriptyline, clomipramine, desipramine, imipramine, and others;  · drugs to treat high blood pressure or a prostate disorder --doxazosin, prazosin, terazosin;  · heart or blood pressure medicine --amiodarone, diltiazem, propafenone, quinidine, verapamil, and others;  · NSAIDs (nonsteroidal anti-inflammatory drugs) --aspirin, ibuprofen (Advil, Motrin), naproxen (Aleve), celecoxib, diclofenac, indomethacin, meloxicam, and others; or  · steroid medicine --prednisone and others. This list is not complete. Other drugs may interact with propranolol, including prescription and over-the-counter medicines, vitamins, and herbal products. Not all possible interactions are listed in this medication guide. Where can I get more information? Your pharmacist can provide more information about propranolol.     Remember, keep this and all other medicines out of the reach of children, never share your medicines with others, and use this medication only for the indication prescribed. Every effort has been made to ensure that the information provided by [address] is accurate, up-to-date, and complete, but no guarantee is made to that effect. Drug information contained herein may be time sensitive. [facility] information has been compiled for use by healthcare practitioners and consumers in the [place] and therefore [facility] does not warrant that uses outside of the [place] are appropriate, unless specifically indicated otherwise. [facility]'s drug information does not endorse drugs, diagnose patients or recommend therapy. [facility]'s drug information is an informational resource designed to assist licensed healthcare practitioners in caring for their patients and/or to serve consumers viewing this service as a supplement to, and not a substitute for, the expertise, skill, knowledge and judgment of healthcare practitioners. The absence of a warning for a given drug or drug combination in no way should be construed to indicate that the drug or drug combination is safe, effective or appropriate for any given patient. [facility] does not assume any responsibility for any aspect of healthcare administered with the aid of information [facility] provides. The information contained herein is not intended to cover all possible uses, directions, precautions, warnings, drug interactions, allergic reactions, or adverse effects. If you have questions about the drugs you are taking, check with your doctor, nurse or pharmacist.  Copyright 3504-7430 [address]: 12.01. Revision date: 8/22/2016. Care instructions adapted under license by [facility] [place] ([facility]). If you have questions about a medical condition or this instruction, always ask your healthcare professional. [place] [address] any warranty or liability for your use of this information.           Back Pain: Care Instructions  Your Care Instructions    Back

## 2018-08-24 NOTE — TELEPHONE ENCOUNTER
Marquis Del Valle called requesting a refill of the below medication which has been pended for you:     Requested Prescriptions     Pending Prescriptions Disp Refills    propranolol (INDERAL) 10 MG tablet [Pharmacy Med Name: PROPRANOLOL 10 MG TABLET 10 TAB] 360 tablet 1     Sig: TAKE 2 TABLETS BY MOUTH 2 TIMES DAILY       Last Appointment Date: 8/24/2018  Next Appointment Date: 12/18/2018    No Known Allergies

## 2018-08-24 NOTE — LETTER
MEDICATION AGREEMENT     Nitza Zaman  6/1/2088      For certain conditions, multiple classes of medications may be used to help better manage your symptoms, and to improve your ability to function at home, work and in social settings. However, these medications do have risks, which will be discussed with you, including addiction and dependency. The following prescribed medications need frequent monitoring and will require you to partner and assist in your healthcare. Medication  Dose, instructions and quantity as indicated on current prescription bottle Diagnosis/Reason(s) for Taking Category   Norco 10-325mg  Take 1 tablet by mouth every 6 hrs as needed for pain   M54.5                            Benefits and goals of Controlled Substance Medications: There are two potential goals for your treatment: (1) decreased pain and suffering (2) improved daily life functions. There are many possible treatments for your chronic condition(s), and, in addition to controlled substance medications, we will try alternatives such as physical therapy, yoga, massage, home daily exercise, meditation, relaxation techniques, injections, chiropractic manipulations, surgery, cognitive therapy, hypnosis and many medications that are not habit-forming. Use of controlled substance medications may be helpful, but they are unlikely to resolve all of your symptoms or restore all function. Risks of Controlled Substance Medications:    Opioid pain medications: These medications can lead to problems such as addiction/dependence, sedation, lightheadedness/dizziness, memory issues, falls, constipation, nausea, or vomiting. They may also impair the ability to drive or operate machinery. Additionally, these medications may lower testosterone levels, leading to loss of bone strength, stamina and sex drive.   They may cause problems with breathing, sleep apnea and reduced coughing, which are especially dangerous for patients with lung disease. Overdose or dangerous interactions with alcohol and other medications may occur, leading to death. Hyperalgesia may develop, in which patients receiving opioids for the treatment of pain may actually become more sensitive to certain painful stimuli, and in some cases, experience pain from ordinarily non-painful stimuli. Women between the ages of 14-53 who could become pregnant should carefully weigh the risks and benefits of opioids with their physicians, as these medications increase the risk of pregnancy complications, including miscarriage,  delivery and stillbirth. It is also possible for babies to be born addicted to opioids. Opioid dependence withdrawal symptoms may include; feelings of uneasiness, increased pain, irritability, belly pain, diarrhea, sweats and goose-flesh. Benzodiazepines and non-benzodiazepine sleep medications: These medications can lead to problems such as addiction/dependence, sedation, fatigue, lightheadedness, dizziness, incoordination, falls, depression, hallucinations, and impaired judgment, memory and concentration. The ability to drive and operate machinery may also be affected. Abnormal sleep-related behaviors have been reported, including sleep walking, driving, making telephone calls, eating, or having sex while not fully awake. These medications can suppress breathing and worsen sleep apnea, particularly when combined with alcohol or other sedating medications, potentially leading to death. Dependence withdrawal symptoms may include tremors, anxiety, hallucinations and seizures. Stimulants:  Common adverse effects include addiction/dependence, increased blood pressure and heart rate, decreased appetite, nausea, involuntary weight loss, insomnia, irritability, and headaches.   These risks may increase when these medications are combined with other stimulants, such as caffeine pills or energy drinks, certain weight loss supplements and oral decongestants. Dependence withdrawal symptoms may include depressed mood, loss of interest, suicidal thoughts, anxiety, fatigue, appetite changes and agitation. Testosterone replacement therapy:  Potential side effects include increased risk of stroke and heart attack, blood clots, increased blood pressure, increased cholesterol, enlarged prostate, sleep apnea, irritability/aggression and other mood disorders, and decreased fertility. Other:     1. I understand that I have the following responsibilities:  · I will take medications at the dose and frequency prescribed. · I will not increase or change how I take my medications without the approval of the health care provider who signs this Medication Agreement. · I will arrange for refills at the prescribed interval ONLY during regular office hours. I will not ask for refills earlier than agreed, after-hours, on holidays or on weekends. · I will obtain all refills for these medications at  ·  ____________________________________  pharmacy (phone number  ·  ________________________), with full consent for my provider and pharmacist to exchange information in writing or verbally. · I will not request any pain medications or controlled substances from other providers and will inform this provider of all other medications I am taking. · I will inform my other health care providers that I am taking these medications and of the existence of this [address]. In the event of an emergency, I will provide the same information to the emergency department providers. · I will protect my prescriptions and medications. I understand that lost or misplaced prescriptions will not be replaced. · I will keep medications only for my own use and will not share them with others. I will keep all medications away from children. · I agree to participate in any medical, psychological or psychiatric assessments recommended by my provider. · I will actively participate in any program designed to improve function, including social, physical, psychological and daily or work activities. 2. I will not use illegal or street drugs or another person's prescription. If I have an addiction problem with drugs or alcohol and my provider asks me to enter a program to address this issue, I agree to follow through. Such programs may include:  · 12-Step program and securing a sponsor  · Individual counseling   · Inpatient or outpatient treatment  · Other:_____________________________________________________________________________________________________________________________________________    If in treatment, I will request that a copy of the programs initial evaluation and treatment recommendations be sent to this provider and will not expect refills until that is received. I will also request written monthly updates be sent to this provider to verify my continuing treatment. 3. I will consent to drug screening upon my providers request to assure I am only taking the prescribed drugs, described in this MEDICATION AGREEMENT. I understand that a drug screen is a laboratory test in which a sample of my urine, blood or saliva is checked to see what drugs I have been taking. 4. I agree that I will treat the providers and staff at this office with respect at all times. I will keep all of my scheduled appointments, but if I need to cancel my appointment, I will do so a minimum of 24 hours before it is scheduled. 5. I understand that this provider may stop prescribing the medications listed if:  · I do not show any improvement in pain, or my activity has not improved. · I develop rapid tolerance or loss of improvement, as described in my treatment plan. · I develop significant side effects from the medication. · My behavior is inconsistent with the responsibilities outlined above, which may also result in my being prevented from receiving further care from this office. · Other:____________________________________________________________________    AGREEMENT:    I have read the above and have had all of my questions answered. For chronic disease management, I know that my symptoms can be managed with many types of treatments. A chronic medication trial may be part of my treatment, but I must be an active participant in my care. Medication therapy is only one part of my symptom management plan. In some cases, there may be limited scientific evidence to support the chronic use of certain medications to improve symptoms and daily function. Furthermore, in certain circumstances, there may be scientific information that suggests that use of chronic controlled substances may actually worsen my symptoms and increase my risk of unintentional death directly related to this medication therapy. I know that if my provider feels my risk from controlled medications is greater than my benefit, I will have my controlled substance medication(s) compassionately lowered or removed altogether. I agree to a controlled substance medication trial.      I further agree to allow this office to contact family or friends if there are concerns about my safety and use of the controlled medications. I have agreed to use the following medications above as instructed by my physician and as stated in this [address].      Patient Signature:  ______________________  Date:8/24/2018 or _____________    Provider Signature:______________________  Date:8/24/2018 or _____________

## 2018-08-26 ASSESSMENT — ENCOUNTER SYMPTOMS
SORE THROAT: 0
SINUS PRESSURE: 0
VOMITING: 0
ABDOMINAL PAIN: 0
CHEST TIGHTNESS: 0
COLOR CHANGE: 0
RHINORRHEA: 0
EYE DISCHARGE: 0
BACK PAIN: 1
EYE ITCHING: 0
EYE PAIN: 0
ABDOMINAL DISTENTION: 0
TROUBLE SWALLOWING: 0
SHORTNESS OF BREATH: 0
COUGH: 0
VOICE CHANGE: 0
DIARRHEA: 0
NAUSEA: 0
CONSTIPATION: 0
PHOTOPHOBIA: 0
BLOOD IN STOOL: 0
EYE REDNESS: 0
WHEEZING: 0

## 2018-08-26 NOTE — PROGRESS NOTES
Cigarettes     Quit date: 12/15/1990    Smokeless tobacco: Never Used    Alcohol use No    Drug use: No    Sexual activity: Not Asked     Other Topics Concern    None     Social History Narrative    None      Family History   Problem Relation Age of Onset    No Known Problems Mother     No Known Problems Father         Current Outpatient Prescriptions   Medication Sig Dispense Refill    promethazine (PHENERGAN) 25 MG tablet Take 1 tablet by mouth every 6 hours as needed for Nausea 30 tablet 3    propranolol (INDERAL) 40 MG tablet Take 1 tablet by mouth 2 times daily 180 tablet 3    HYDROcodone-acetaminophen (NORCO)  MG per tablet Take 1 tablet by mouth every 6 hours as needed for Pain (three times daily). Valma Guild Date: 8/24/18 90 tablet 0    diazepam (VALIUM) 10 MG tablet Take 1 tablet by mouth 2 times daily as needed for Anxiety. . 60 tablet 1    omeprazole (PRILOSEC) 20 MG capsule Take 20 mg by mouth 2 times daily.  loratadine (CLARITIN) 10 MG tablet Take 10 mg by mouth daily.  Multiple Vitamin (MULTI-VITAMIN PO) Take  by mouth.  propranolol (INDERAL) 10 MG tablet TAKE 2 TABLETS BY MOUTH 2 TIMES DAILY 360 tablet 1    HYDROcodone-acetaminophen (NORCO)  MG per tablet TAKE 1 TABLET BY MOUTH EVERY 6 HOURS AS NEEDED FOR PAIN (MAX OF 2 AND 1/2 PILLS DAILY) . Horace Stone Earliest Fill Date: 7/20/18 90 tablet 0    PROPRANOLOL HCL by Does not apply route. No current facility-administered medications for this visit. There is no problem list on file for this patient. Review of Systems   Constitutional: Negative for activity change, appetite change, chills, diaphoresis, fatigue, fever and unexpected weight change. HENT: Negative for congestion, ear pain, hearing loss, nosebleeds, postnasal drip, rhinorrhea, sinus pressure, sneezing, sore throat, tinnitus, trouble swallowing and voice change.     Eyes: Negative for photophobia, pain, discharge, redness, itching Environmental allergies: Well-controlled on current medication    Controlled Substances Monitoring:     RX Monitoring 8/26/2018   Attestation The Prescription Monitoring Report for this patient was reviewed today. Documentation Possible medication side effects, risk of tolerance/dependence & alternative treatments discussed. ;No signs of potential drug abuse or diversion identified. Acute Pain Prescriptions Severe pain not adequately treated with lower dose. Chronic Pain Dose reduction has been attempted. ;Reestablished informed consent. ;Functional status reviewed - continues with improved or maintaining ADL's. Medication Contracts Existing medication contract. Romina Panda was seen today for other. Diagnoses and all orders for this visit:    Low back pain, unspecified back pain laterality, unspecified chronicity, with sciatica presence unspecified  -     HYDROcodone-acetaminophen (NORCO)  MG per tablet; Take 1 tablet by mouth every 6 hours as needed for Pain (three times daily). Atlee Riki Date: 8/24/18  -     CBC Auto Differential; Future  -     Comprehensive Metabolic Panel; Future  -     Lipid Panel; Future  -     TSH without Reflex; Future  -     Psa screening; Future    Need for prophylactic vaccination with tetanus-diphtheria (Td)    Need for prophylactic vaccination and inoculation against varicella    Other orders  -     Cancel: Td (Decavac or Tenivac) vaccine  -     Cancel: In- - Zoster (SHINGRIX)  -     promethazine (PHENERGAN) 25 MG tablet; Take 1 tablet by mouth every 6 hours as needed for Nausea  -     propranolol (INDERAL) 40 MG tablet;  Take 1 tablet by mouth 2 times daily          Return in about 4 months (around 12/24/2018) for physical.     Orders Placed This Encounter   Procedures    CBC Auto Differential     Fast 12 hours     Standing Status:   Future     Standing Expiration Date:   2/24/2019    Comprehensive Metabolic Panel     Fasting 12 hours Standing Status:   Future     Standing Expiration Date:   2/24/2019    Lipid Panel     Standing Status:   Future     Standing Expiration Date:   2/24/2019     Order Specific Question:   Is Patient Fasting?/# of Hours     Answer:   12    TSH without Reflex     Fast 12 hours     Standing Status:   Future     Standing Expiration Date:   2/24/2019    Psa screening     Standing Status:   Future     Standing Expiration Date:   2/24/2019       Easton Hanks MD

## 2018-09-12 DIAGNOSIS — F41.9 ANXIETY: ICD-10-CM

## 2018-09-12 DIAGNOSIS — G89.29 OTHER CHRONIC PAIN: ICD-10-CM

## 2018-09-12 DIAGNOSIS — M54.5 LOW BACK PAIN, UNSPECIFIED BACK PAIN LATERALITY, UNSPECIFIED CHRONICITY, WITH SCIATICA PRESENCE UNSPECIFIED: ICD-10-CM

## 2018-09-12 RX ORDER — HYDROCODONE BITARTRATE AND ACETAMINOPHEN 10; 325 MG/1; MG/1
1 TABLET ORAL EVERY 6 HOURS PRN
Qty: 90 TABLET | Refills: 0 | Status: SHIPPED | OUTPATIENT
Start: 2018-09-12 | End: 2018-10-10 | Stop reason: SDUPTHER

## 2018-09-12 RX ORDER — DIAZEPAM 10 MG/1
10 TABLET ORAL 2 TIMES DAILY PRN
Qty: 60 TABLET | Refills: 1 | Status: SHIPPED | OUTPATIENT
Start: 2018-09-12 | End: 2018-10-10 | Stop reason: SDUPTHER

## 2018-09-12 NOTE — TELEPHONE ENCOUNTER
Maurisio Cunningham called requesting a refill of the below medication which has been pended for you:     Requested Prescriptions     Pending Prescriptions Disp Refills    HYDROcodone-acetaminophen (NORCO)  MG per tablet 90 tablet 0     Sig: Take 1 tablet by mouth every 6 hours as needed for Pain (three times daily). Francisco Olivas diazepam (VALIUM) 10 MG tablet 60 tablet 1     Sig: Take 1 tablet by mouth 2 times daily as needed for Anxiety. .       Last Appointment Date: 8/24/2018  Next Appointment Date: 12/18/2018    No Known Allergies

## 2018-10-10 DIAGNOSIS — M54.5 LOW BACK PAIN, UNSPECIFIED BACK PAIN LATERALITY, UNSPECIFIED CHRONICITY, WITH SCIATICA PRESENCE UNSPECIFIED: ICD-10-CM

## 2018-10-10 DIAGNOSIS — F41.9 ANXIETY: ICD-10-CM

## 2018-10-11 RX ORDER — DIAZEPAM 10 MG/1
10 TABLET ORAL 2 TIMES DAILY PRN
Qty: 60 TABLET | Refills: 1 | Status: SHIPPED | OUTPATIENT
Start: 2018-10-11 | End: 2018-12-12 | Stop reason: SDUPTHER

## 2018-10-11 RX ORDER — HYDROCODONE BITARTRATE AND ACETAMINOPHEN 10; 325 MG/1; MG/1
1 TABLET ORAL EVERY 6 HOURS PRN
Qty: 90 TABLET | Refills: 0 | Status: SHIPPED | OUTPATIENT
Start: 2018-10-11 | End: 2018-11-08 | Stop reason: SDUPTHER

## 2018-11-08 DIAGNOSIS — M54.5 LOW BACK PAIN, UNSPECIFIED BACK PAIN LATERALITY, UNSPECIFIED CHRONICITY, WITH SCIATICA PRESENCE UNSPECIFIED: ICD-10-CM

## 2018-11-08 RX ORDER — HYDROCODONE BITARTRATE AND ACETAMINOPHEN 10; 325 MG/1; MG/1
1 TABLET ORAL EVERY 6 HOURS PRN
Qty: 90 TABLET | Refills: 0 | Status: SHIPPED | OUTPATIENT
Start: 2018-11-08 | End: 2018-12-12 | Stop reason: SDUPTHER

## 2018-12-11 DIAGNOSIS — M54.5 LOW BACK PAIN, UNSPECIFIED BACK PAIN LATERALITY, UNSPECIFIED CHRONICITY, WITH SCIATICA PRESENCE UNSPECIFIED: ICD-10-CM

## 2018-12-11 LAB
ALBUMIN SERPL-MCNC: 3.9 G/DL (ref 3.5–5.2)
ALP BLD-CCNC: 52 U/L (ref 40–130)
ALT SERPL-CCNC: 21 U/L (ref 5–41)
ANION GAP SERPL CALCULATED.3IONS-SCNC: 13 MMOL/L (ref 7–19)
AST SERPL-CCNC: 32 U/L (ref 5–40)
BASOPHILS ABSOLUTE: 0.1 K/UL (ref 0–0.2)
BASOPHILS RELATIVE PERCENT: 1.3 % (ref 0–1)
BILIRUB SERPL-MCNC: <0.2 MG/DL (ref 0.2–1.2)
BUN BLDV-MCNC: 15 MG/DL (ref 6–20)
CALCIUM SERPL-MCNC: 9.3 MG/DL (ref 8.6–10)
CHLORIDE BLD-SCNC: 105 MMOL/L (ref 98–111)
CHOLESTEROL, TOTAL: 125 MG/DL (ref 160–199)
CO2: 27 MMOL/L (ref 22–29)
CREAT SERPL-MCNC: 1.2 MG/DL (ref 0.5–1.2)
EOSINOPHILS ABSOLUTE: 0.2 K/UL (ref 0–0.6)
EOSINOPHILS RELATIVE PERCENT: 3.8 % (ref 0–5)
GFR NON-AFRICAN AMERICAN: >60
GLUCOSE BLD-MCNC: 115 MG/DL (ref 74–109)
HCT VFR BLD CALC: 40.5 % (ref 42–52)
HDLC SERPL-MCNC: 52 MG/DL (ref 55–121)
HEMOGLOBIN: 12.7 G/DL (ref 14–18)
LDL CHOLESTEROL CALCULATED: 57 MG/DL
LYMPHOCYTES ABSOLUTE: 1.8 K/UL (ref 1.1–4.5)
LYMPHOCYTES RELATIVE PERCENT: 32 % (ref 20–40)
MCH RBC QN AUTO: 29.4 PG (ref 27–31)
MCHC RBC AUTO-ENTMCNC: 31.4 G/DL (ref 33–37)
MCV RBC AUTO: 93.8 FL (ref 80–94)
MONOCYTES ABSOLUTE: 0.6 K/UL (ref 0–0.9)
MONOCYTES RELATIVE PERCENT: 10.6 % (ref 0–10)
NEUTROPHILS ABSOLUTE: 2.9 K/UL (ref 1.5–7.5)
NEUTROPHILS RELATIVE PERCENT: 52.1 % (ref 50–65)
PDW BLD-RTO: 13.4 % (ref 11.5–14.5)
PLATELET # BLD: 219 K/UL (ref 130–400)
PMV BLD AUTO: 9.7 FL (ref 9.4–12.4)
POTASSIUM SERPL-SCNC: 5.2 MMOL/L (ref 3.5–5)
PROSTATE SPECIFIC ANTIGEN: 1.34 NG/ML (ref 0–4)
RBC # BLD: 4.32 M/UL (ref 4.7–6.1)
SODIUM BLD-SCNC: 145 MMOL/L (ref 136–145)
TOTAL PROTEIN: 6.6 G/DL (ref 6.6–8.7)
TRIGL SERPL-MCNC: 80 MG/DL (ref 0–149)
TSH SERPL DL<=0.05 MIU/L-ACNC: 2.73 UIU/ML (ref 0.27–4.2)
WBC # BLD: 5.6 K/UL (ref 4.8–10.8)

## 2018-12-12 DIAGNOSIS — M54.5 LOW BACK PAIN, UNSPECIFIED BACK PAIN LATERALITY, UNSPECIFIED CHRONICITY, WITH SCIATICA PRESENCE UNSPECIFIED: ICD-10-CM

## 2018-12-12 DIAGNOSIS — F41.9 ANXIETY: ICD-10-CM

## 2018-12-13 RX ORDER — HYDROCODONE BITARTRATE AND ACETAMINOPHEN 10; 325 MG/1; MG/1
1 TABLET ORAL EVERY 6 HOURS PRN
Qty: 90 TABLET | Refills: 0 | Status: SHIPPED | OUTPATIENT
Start: 2018-12-13 | End: 2019-01-11 | Stop reason: SDUPTHER

## 2018-12-13 RX ORDER — DIAZEPAM 10 MG/1
10 TABLET ORAL 2 TIMES DAILY PRN
Qty: 60 TABLET | Refills: 1 | Status: SHIPPED | OUTPATIENT
Start: 2018-12-13 | End: 2019-02-06 | Stop reason: SDUPTHER

## 2018-12-14 RX ORDER — PROMETHAZINE HYDROCHLORIDE 25 MG/1
25 TABLET ORAL EVERY 6 HOURS PRN
Qty: 30 TABLET | Refills: 3 | Status: SHIPPED | OUTPATIENT
Start: 2018-12-14 | End: 2019-04-26 | Stop reason: SDUPTHER

## 2018-12-18 ENCOUNTER — OFFICE VISIT (OUTPATIENT)
Dept: INTERNAL MEDICINE | Age: 48
End: 2018-12-18
Payer: COMMERCIAL

## 2018-12-18 VITALS
DIASTOLIC BLOOD PRESSURE: 62 MMHG | HEIGHT: 72 IN | OXYGEN SATURATION: 99 % | BODY MASS INDEX: 21.13 KG/M2 | HEART RATE: 91 BPM | SYSTOLIC BLOOD PRESSURE: 124 MMHG | WEIGHT: 156 LBS

## 2018-12-18 DIAGNOSIS — F41.9 ANXIETY DISORDER, UNSPECIFIED TYPE: ICD-10-CM

## 2018-12-18 DIAGNOSIS — M54.9 CHRONIC BACK PAIN, UNSPECIFIED BACK LOCATION, UNSPECIFIED BACK PAIN LATERALITY: ICD-10-CM

## 2018-12-18 DIAGNOSIS — E78.5 HYPERLIPIDEMIA, UNSPECIFIED HYPERLIPIDEMIA TYPE: ICD-10-CM

## 2018-12-18 DIAGNOSIS — G89.29 CHRONIC BACK PAIN, UNSPECIFIED BACK LOCATION, UNSPECIFIED BACK PAIN LATERALITY: ICD-10-CM

## 2018-12-18 DIAGNOSIS — Z00.00 ROUTINE ADULT HEALTH MAINTENANCE: Primary | ICD-10-CM

## 2018-12-18 DIAGNOSIS — G25.0 ESSENTIAL TREMOR: ICD-10-CM

## 2018-12-18 DIAGNOSIS — D50.9 IRON DEFICIENCY ANEMIA, UNSPECIFIED IRON DEFICIENCY ANEMIA TYPE: ICD-10-CM

## 2018-12-18 DIAGNOSIS — Z91.09 ENVIRONMENTAL ALLERGIES: ICD-10-CM

## 2018-12-18 PROCEDURE — 99396 PREV VISIT EST AGE 40-64: CPT | Performed by: INTERNAL MEDICINE

## 2018-12-18 ASSESSMENT — PATIENT HEALTH QUESTIONNAIRE - PHQ9
SUM OF ALL RESPONSES TO PHQ QUESTIONS 1-9: 0
1. LITTLE INTEREST OR PLEASURE IN DOING THINGS: 0
2. FEELING DOWN, DEPRESSED OR HOPELESS: 0
SUM OF ALL RESPONSES TO PHQ9 QUESTIONS 1 & 2: 0
SUM OF ALL RESPONSES TO PHQ QUESTIONS 1-9: 0

## 2018-12-18 NOTE — PATIENT INSTRUCTIONS
example, you can exercise 3 times a day for 10 or 20 minutes. Moderate exercise is safe for most people, but it is always a good idea to talk to your doctor before starting an exercise program.  · Keep moving. Noelle Navarro the lawn, work in the garden, or [employer]. Take the stairs instead of the elevator at work. · If you smoke, quit. People who smoke have an increased risk for heart attack, stroke, cancer, and other lung illnesses. Quitting is hard, but there are ways to boost your chance of quitting tobacco for good. ? Use nicotine gum, patches, or lozenges. ? Ask your doctor about stop-smoking programs and medicines. ? Keep trying. In addition to reducing your risk of diseases in the future, you will notice some benefits soon after you stop using tobacco. If you have shortness of breath or asthma symptoms, they will likely get better within a few weeks after you quit. · Limit how much alcohol you drink. Moderate amounts of alcohol (up to 2 drinks a day for men, 1 drink a day for women) are okay. But drinking too much can lead to liver problems, high blood pressure, and other health problems. Family health  If you have a family, there are many things you can do together to improve your health. · Eat meals together as a family as often as possible. · Eat healthy foods. This includes fruits, vegetables, lean meats and dairy, and whole grains. · Include your family in your fitness plan. Most people think of activities such as jogging or tennis as the way to fitness, but there are many ways you and your family can be more active. Anything that makes you breathe hard and gets your heart pumping is exercise. Here are some tips:  ? Walk to do errands or to take your child to school or the bus.  ? Go for a family bike ride after dinner instead of watching TV. Where can you learn more? Go to https://[facility]inaeb[facility]. org and sign in to your [employer] account.  Enter B112 in the [facility] box to learn more about \"A Healthy Lifestyle: Care Instructions. \"     If you do not have an account, please click on the \"Sign Up Now\" link. Current as of: December 7, 2017  Content Version: 11.8  © 7089-4824 [employer]. Care instructions adapted under license by [facility] ([facility]). If you have questions about a medical condition or this instruction, always ask your healthcare professional. [address] any warranty or liability for your use of this information.

## 2018-12-19 ASSESSMENT — ENCOUNTER SYMPTOMS
ABDOMINAL PAIN: 0
SINUS PRESSURE: 0
TROUBLE SWALLOWING: 0
DIARRHEA: 0
WHEEZING: 0
EYE PAIN: 0
VOICE CHANGE: 0
VOMITING: 0
COLOR CHANGE: 0
RHINORRHEA: 0
COUGH: 0
EYE ITCHING: 0
EYE REDNESS: 0
CONSTIPATION: 0
ABDOMINAL DISTENTION: 0
SHORTNESS OF BREATH: 0
BACK PAIN: 1
BLOOD IN STOOL: 0
PHOTOPHOBIA: 0
NAUSEA: 0
CHEST TIGHTNESS: 0
EYE DISCHARGE: 0
SORE THROAT: 0

## 2018-12-19 NOTE — PROGRESS NOTES
mouth 2 times daily as needed for Anxiety. . 60 tablet 1    HYDROcodone-acetaminophen (NORCO)  MG per tablet Take 1 tablet by mouth every 6 hours as needed for Pain (three times daily). Donna Breath Date: 12/13/18 90 tablet 0    propranolol (INDERAL) 40 MG tablet Take 1 tablet by mouth 2 times daily 180 tablet 3    omeprazole (PRILOSEC) 20 MG capsule Take 20 mg by mouth 2 times daily.  loratadine (CLARITIN) 10 MG tablet Take 10 mg by mouth daily.  Multiple Vitamin (MULTI-VITAMIN PO) Take  by mouth. No current facility-administered medications for this visit. There is no problem list on file for this patient. Review of Systems   Constitutional: Negative for activity change, appetite change, chills, diaphoresis, fatigue, fever and unexpected weight change. HENT: Negative for congestion, ear pain, hearing loss, nosebleeds, postnasal drip, rhinorrhea, sinus pressure, sneezing, sore throat, tinnitus, trouble swallowing and voice change. Eyes: Negative for photophobia, pain, discharge, redness, itching and visual disturbance. Respiratory: Negative for cough, chest tightness, shortness of breath and wheezing. Cardiovascular: Negative for chest pain, palpitations and leg swelling. Gastrointestinal: Negative for abdominal distention, abdominal pain, blood in stool, constipation, diarrhea, nausea and vomiting. Endocrine: Negative for cold intolerance, heat intolerance, polydipsia, polyphagia and polyuria. Genitourinary: Negative for difficulty urinating, dysuria, enuresis, frequency, hematuria and urgency. Musculoskeletal: Positive for back pain and neck pain. Chronic low back and neck pain   Skin: Negative for color change, pallor, rash and wound. Allergic/Immunologic: Negative for environmental allergies, food allergies and immunocompromised state. Neurological: Positive for tremors.  Negative for dizziness, syncope, speech difficulty, weakness,

## 2019-01-11 DIAGNOSIS — M54.5 LOW BACK PAIN, UNSPECIFIED BACK PAIN LATERALITY, UNSPECIFIED CHRONICITY, WITH SCIATICA PRESENCE UNSPECIFIED: ICD-10-CM

## 2019-01-11 RX ORDER — HYDROCODONE BITARTRATE AND ACETAMINOPHEN 10; 325 MG/1; MG/1
1 TABLET ORAL EVERY 6 HOURS PRN
Qty: 90 TABLET | Refills: 0 | Status: SHIPPED | OUTPATIENT
Start: 2019-01-11 | End: 2019-02-06 | Stop reason: SDUPTHER

## 2019-02-06 DIAGNOSIS — M54.5 LOW BACK PAIN, UNSPECIFIED BACK PAIN LATERALITY, UNSPECIFIED CHRONICITY, WITH SCIATICA PRESENCE UNSPECIFIED: ICD-10-CM

## 2019-02-06 DIAGNOSIS — F41.9 ANXIETY: ICD-10-CM

## 2019-02-06 RX ORDER — HYDROCODONE BITARTRATE AND ACETAMINOPHEN 10; 325 MG/1; MG/1
1 TABLET ORAL EVERY 6 HOURS PRN
Qty: 90 TABLET | Refills: 0 | Status: SHIPPED | OUTPATIENT
Start: 2019-02-06 | End: 2019-03-04 | Stop reason: SDUPTHER

## 2019-02-06 RX ORDER — DIAZEPAM 10 MG/1
10 TABLET ORAL 2 TIMES DAILY PRN
Qty: 60 TABLET | Refills: 1 | Status: SHIPPED | OUTPATIENT
Start: 2019-02-06 | End: 2019-04-03 | Stop reason: SDUPTHER

## 2019-03-04 DIAGNOSIS — M54.5 LOW BACK PAIN, UNSPECIFIED BACK PAIN LATERALITY, UNSPECIFIED CHRONICITY, WITH SCIATICA PRESENCE UNSPECIFIED: ICD-10-CM

## 2019-03-04 RX ORDER — HYDROCODONE BITARTRATE AND ACETAMINOPHEN 10; 325 MG/1; MG/1
1 TABLET ORAL EVERY 6 HOURS PRN
Qty: 90 TABLET | Refills: 0 | Status: SHIPPED | OUTPATIENT
Start: 2019-03-04 | End: 2019-04-03 | Stop reason: SDUPTHER

## 2019-04-03 DIAGNOSIS — M54.5 LOW BACK PAIN, UNSPECIFIED BACK PAIN LATERALITY, UNSPECIFIED CHRONICITY, WITH SCIATICA PRESENCE UNSPECIFIED: ICD-10-CM

## 2019-04-03 DIAGNOSIS — F41.9 ANXIETY: ICD-10-CM

## 2019-04-03 RX ORDER — DIAZEPAM 10 MG/1
10 TABLET ORAL 2 TIMES DAILY PRN
Qty: 60 TABLET | Refills: 1 | Status: SHIPPED | OUTPATIENT
Start: 2019-04-03 | End: 2019-06-03 | Stop reason: SDUPTHER

## 2019-04-03 RX ORDER — HYDROCODONE BITARTRATE AND ACETAMINOPHEN 10; 325 MG/1; MG/1
1 TABLET ORAL EVERY 6 HOURS PRN
Qty: 90 TABLET | Refills: 0 | Status: SHIPPED | OUTPATIENT
Start: 2019-04-03 | End: 2019-05-02 | Stop reason: SDUPTHER

## 2019-04-03 NOTE — TELEPHONE ENCOUNTER
Jayesh Perez called requesting a refill of the below medication which has been pended for you:     Requested Prescriptions     Pending Prescriptions Disp Refills    diazepam (VALIUM) 10 MG tablet 60 tablet 1     Sig: Take 1 tablet by mouth 2 times daily as needed for Anxiety.  HYDROcodone-acetaminophen (NORCO)  MG per tablet 90 tablet 0     Sig: Take 1 tablet by mouth every 6 hours as needed for Pain (three times daily).        Last Appointment Date: 12/18/2018  Next Appointment Date: 4/26/2019    No Known Allergies

## 2019-04-26 ENCOUNTER — OFFICE VISIT (OUTPATIENT)
Dept: INTERNAL MEDICINE | Age: 49
End: 2019-04-26
Payer: COMMERCIAL

## 2019-04-26 VITALS
WEIGHT: 148 LBS | OXYGEN SATURATION: 98 % | HEIGHT: 72 IN | HEART RATE: 80 BPM | BODY MASS INDEX: 20.05 KG/M2 | DIASTOLIC BLOOD PRESSURE: 78 MMHG | SYSTOLIC BLOOD PRESSURE: 118 MMHG

## 2019-04-26 DIAGNOSIS — R25.1 TREMOR: ICD-10-CM

## 2019-04-26 DIAGNOSIS — I10 ESSENTIAL HYPERTENSION: ICD-10-CM

## 2019-04-26 DIAGNOSIS — Z91.09 ENVIRONMENTAL ALLERGIES: ICD-10-CM

## 2019-04-26 DIAGNOSIS — M54.5 CHRONIC LOW BACK PAIN, UNSPECIFIED BACK PAIN LATERALITY, WITH SCIATICA PRESENCE UNSPECIFIED: ICD-10-CM

## 2019-04-26 DIAGNOSIS — E78.5 HYPERLIPIDEMIA, UNSPECIFIED HYPERLIPIDEMIA TYPE: ICD-10-CM

## 2019-04-26 DIAGNOSIS — G89.29 CHRONIC LOW BACK PAIN, UNSPECIFIED BACK PAIN LATERALITY, WITH SCIATICA PRESENCE UNSPECIFIED: ICD-10-CM

## 2019-04-26 DIAGNOSIS — D50.9 IRON DEFICIENCY ANEMIA, UNSPECIFIED IRON DEFICIENCY ANEMIA TYPE: ICD-10-CM

## 2019-04-26 DIAGNOSIS — Z79.899 HIGH RISK MEDICATION USE: Primary | ICD-10-CM

## 2019-04-26 DIAGNOSIS — K21.9 GASTROESOPHAGEAL REFLUX DISEASE WITHOUT ESOPHAGITIS: ICD-10-CM

## 2019-04-26 LAB
ALBUMIN SERPL-MCNC: 4.5 G/DL (ref 3.5–5.2)
ALP BLD-CCNC: 56 U/L (ref 40–130)
ALT SERPL-CCNC: 29 U/L (ref 5–41)
AMPHETAMINE SCREEN, URINE: NEGATIVE
ANION GAP SERPL CALCULATED.3IONS-SCNC: 13 MMOL/L (ref 7–19)
AST SERPL-CCNC: 39 U/L (ref 5–40)
BARBITURATE SCREEN, URINE: NEGATIVE
BASOPHILS ABSOLUTE: 0.1 K/UL (ref 0–0.2)
BASOPHILS RELATIVE PERCENT: 1.6 % (ref 0–1)
BENZODIAZEPINE SCREEN, URINE: POSITIVE
BILIRUB SERPL-MCNC: 0.3 MG/DL (ref 0.2–1.2)
BUN BLDV-MCNC: 18 MG/DL (ref 6–20)
BUPRENORPHINE URINE: NEGATIVE
CALCIUM SERPL-MCNC: 10.2 MG/DL (ref 8.6–10)
CHLORIDE BLD-SCNC: 100 MMOL/L (ref 98–111)
CHOLESTEROL, TOTAL: 129 MG/DL (ref 160–199)
CO2: 30 MMOL/L (ref 22–29)
COCAINE METABOLITE SCREEN URINE: NEGATIVE
CREAT SERPL-MCNC: 1.1 MG/DL (ref 0.5–1.2)
EOSINOPHILS ABSOLUTE: 0.2 K/UL (ref 0–0.6)
EOSINOPHILS RELATIVE PERCENT: 3.9 % (ref 0–5)
GABAPENTIN SCREEN, URINE: NEGATIVE
GFR NON-AFRICAN AMERICAN: >60
GLUCOSE BLD-MCNC: 101 MG/DL (ref 74–109)
HCT VFR BLD CALC: 42.8 % (ref 42–52)
HDLC SERPL-MCNC: 49 MG/DL (ref 55–121)
HEMOGLOBIN: 13.6 G/DL (ref 14–18)
LDL CHOLESTEROL CALCULATED: 53 MG/DL
LYMPHOCYTES ABSOLUTE: 1.5 K/UL (ref 1.1–4.5)
LYMPHOCYTES RELATIVE PERCENT: 29.4 % (ref 20–40)
MCH RBC QN AUTO: 29.1 PG (ref 27–31)
MCHC RBC AUTO-ENTMCNC: 31.8 G/DL (ref 33–37)
MCV RBC AUTO: 91.5 FL (ref 80–94)
MDMA URINE: NEGATIVE
METHADONE SCREEN, URINE: NEGATIVE
METHAMPHETAMINE, URINE: NEGATIVE
MONOCYTES ABSOLUTE: 0.5 K/UL (ref 0–0.9)
MONOCYTES RELATIVE PERCENT: 9.7 % (ref 0–10)
NEUTROPHILS ABSOLUTE: 2.8 K/UL (ref 1.5–7.5)
NEUTROPHILS RELATIVE PERCENT: 55.2 % (ref 50–65)
OPIATE SCREEN URINE: POSITIVE
OXYCODONE SCREEN URINE: NEGATIVE
PDW BLD-RTO: 13.7 % (ref 11.5–14.5)
PHENCYCLIDINE SCREEN URINE: NEGATIVE
PLATELET # BLD: 247 K/UL (ref 130–400)
PMV BLD AUTO: 10 FL (ref 9.4–12.4)
POTASSIUM SERPL-SCNC: 4.8 MMOL/L (ref 3.5–5)
PROPOXYPHENE SCREEN, URINE: NEGATIVE
RBC # BLD: 4.68 M/UL (ref 4.7–6.1)
SODIUM BLD-SCNC: 143 MMOL/L (ref 136–145)
THC SCREEN, URINE: NEGATIVE
TOTAL PROTEIN: 7.1 G/DL (ref 6.6–8.7)
TRICYCLIC ANTIDEPRESSANTS, UR: NEGATIVE
TRIGL SERPL-MCNC: 135 MG/DL (ref 0–149)
WBC # BLD: 5.1 K/UL (ref 4.8–10.8)

## 2019-04-26 PROCEDURE — 80305 DRUG TEST PRSMV DIR OPT OBS: CPT | Performed by: INTERNAL MEDICINE

## 2019-04-26 PROCEDURE — 99214 OFFICE O/P EST MOD 30 MIN: CPT | Performed by: INTERNAL MEDICINE

## 2019-04-26 RX ORDER — PROMETHAZINE HYDROCHLORIDE 25 MG/1
25 TABLET ORAL EVERY 6 HOURS PRN
Qty: 30 TABLET | Refills: 5 | Status: SHIPPED | OUTPATIENT
Start: 2019-04-26 | End: 2019-08-30 | Stop reason: SDUPTHER

## 2019-04-26 RX ORDER — PROPRANOLOL HYDROCHLORIDE 40 MG/1
40 TABLET ORAL 2 TIMES DAILY
Qty: 180 TABLET | Refills: 3 | Status: SHIPPED | OUTPATIENT
Start: 2019-04-26 | End: 2020-05-08

## 2019-04-26 ASSESSMENT — PATIENT HEALTH QUESTIONNAIRE - PHQ9
2. FEELING DOWN, DEPRESSED OR HOPELESS: 0
SUM OF ALL RESPONSES TO PHQ QUESTIONS 1-9: 0
SUM OF ALL RESPONSES TO PHQ9 QUESTIONS 1 & 2: 0
1. LITTLE INTEREST OR PLEASURE IN DOING THINGS: 0
SUM OF ALL RESPONSES TO PHQ QUESTIONS 1-9: 0

## 2019-04-26 NOTE — PATIENT INSTRUCTIONS
Patient Education        Palpitations: Care Instructions  Your Care Instructions    Heart palpitations are the uncomfortable sensation that your heart is beating fast or irregularly. You might feel pounding or fluttering in your chest. It might feel like your heart is skipping a beat. Although palpitations may be caused by a heart problem, they also occur because of stress, fatigue, or use of alcohol, caffeine, or nicotine. Many medicines, including diet pills, antihistamines, decongestants, and some herbal products, can cause heart palpitations. Nearly everyone has palpitations from time to time. Depending on your symptoms, your doctor may need to do more tests to try to find the cause of your palpitations. Follow-up care is a key part of your treatment and safety. Be sure to make and go to all appointments, and call your doctor if you are having problems. It's also a good idea to know your test results and keep a list of the medicines you take. How can you care for yourself at home? · Avoid caffeine, nicotine, and excess alcohol. · Do not take illegal drugs, such as methamphetamines and cocaine. · Do not take weight loss or diet medicines unless you talk with your doctor first.  · Get plenty of sleep. · Do not overeat. · If you have palpitations again, take deep breaths and try to relax. This may slow a racing heart. · If you start to feel lightheaded, lie down to avoid injuries that might result if you pass out and fall down. · Keep a record of your palpitations and bring it to your next doctor's appointment. Write down:  ? The date and time. ? Your pulse. (If your heart is beating fast, it may be hard to count your pulse.)  ? What you were doing when the palpitations started. ? How long the palpitations lasted. ? Any other symptoms. · If an activity causes palpitations, slow down or stop. Talk to your doctor before you do that activity again. · Take your medicines exactly as prescribed.  Call your condition or this instruction, always ask your healthcare professional. [place] [id number] any warranty or liability for your use of this information.

## 2019-04-27 ASSESSMENT — ENCOUNTER SYMPTOMS
ABDOMINAL DISTENTION: 0
ABDOMINAL PAIN: 0
SHORTNESS OF BREATH: 0
SINUS PRESSURE: 0
COLOR CHANGE: 0
WHEEZING: 0
DIARRHEA: 0
EYE PAIN: 0
VOICE CHANGE: 0
RHINORRHEA: 0
PHOTOPHOBIA: 0
EYE REDNESS: 0
BACK PAIN: 1
EYE ITCHING: 0
CONSTIPATION: 0
BLOOD IN STOOL: 0
EYE DISCHARGE: 0
NAUSEA: 0
CHEST TIGHTNESS: 0
VOMITING: 0
SORE THROAT: 0
TROUBLE SWALLOWING: 0
COUGH: 0

## 2019-04-27 NOTE — PROGRESS NOTES
file   Relationships    Social connections:     Talks on phone: Not on file     Gets together: Not on file     Attends Church service: Not on file     Active member of club or organization: Not on file     Attends meetings of clubs or organizations: Not on file     Relationship status: Not on file    Intimate partner violence:     Fear of current or ex partner: Not on file     Emotionally abused: Not on file     Physically abused: Not on file     Forced sexual activity: Not on file   Other Topics Concern    Not on file   Social History Narrative    Not on file      Family History   Problem Relation Age of Onset    No Known Problems Mother     No Known Problems Father         Current Outpatient Medications   Medication Sig Dispense Refill    promethazine (PHENERGAN) 25 MG tablet Take 1 tablet by mouth every 6 hours as needed for Nausea 30 tablet 5    propranolol (INDERAL) 40 MG tablet Take 1 tablet by mouth 2 times daily 180 tablet 3    diazepam (VALIUM) 10 MG tablet Take 1 tablet by mouth 2 times daily as needed for Anxiety. 60 tablet 1    HYDROcodone-acetaminophen (NORCO)  MG per tablet Take 1 tablet by mouth every 6 hours as needed for Pain (three times daily). 90 tablet 0    omeprazole (PRILOSEC) 20 MG capsule Take 20 mg by mouth 2 times daily.  loratadine (CLARITIN) 10 MG tablet Take 10 mg by mouth daily.  Multiple Vitamin (MULTI-VITAMIN PO) Take  by mouth. No current facility-administered medications for this visit. There is no problem list on file for this patient. Review of Systems   Constitutional: Negative for activity change, appetite change, chills, diaphoresis, fatigue, fever and unexpected weight change. HENT: Negative for congestion, ear pain, hearing loss, nosebleeds, postnasal drip, rhinorrhea, sinus pressure, sneezing, sore throat, tinnitus, trouble swallowing and voice change.     Eyes: Negative for photophobia, pain, discharge, redness, present. Cardiovascular: Normal rate, regular rhythm, normal heart sounds and intact distal pulses. Exam reveals no gallop and no friction rub. No murmur heard. Pulmonary/Chest: Effort normal and breath sounds normal. No respiratory distress. He has no wheezes. He has no rales. He exhibits no tenderness. Abdominal: Soft. Bowel sounds are normal. He exhibits no distension and no mass. There is no tenderness. There is no rebound and no guarding. Musculoskeletal: Normal range of motion. He exhibits tenderness. He exhibits no edema or deformity. Gait antalgic. There is tenderness over the lower lumbar spine   Lymphadenopathy:     He has no cervical adenopathy. Neurological: He is alert and oriented to person, place, and time. He has normal reflexes. He displays normal reflexes. No cranial nerve deficit. He exhibits normal muscle tone. Coordination normal.   Tremor to hands noted   Skin: Skin is warm and dry. No rash noted. He is not diaphoretic. No erythema. No pallor. Psychiatric: He has a normal mood and affect. His behavior is normal. Judgment and thought content normal.   Nursing note and vitals reviewed. 1. High risk medication use    2. Tremor    3. Iron deficiency anemia, unspecified iron deficiency anemia type        ASSESSMENT/PLAN:    47-year-old male here for follow-up    1. Hypertension: Blood pressure well controlled on propanolol. Propanolol is also helpful for the tremor. 2. Tremor: Continue Valium and propanolol for tremors. 3. Chronic low back pain: Continue hydrocodone as needed for pain control. He does not take this every 6 hours as prescribed. He only takes it on an as-needed basis. Usually he get by with 1-2 tablets per day. 4. Acid reflux: Stable on omeprazole    5. Environmental allergies: Continue Claritin at current dose    6. Anemia: Improved with iron supplementation      Kiersten Araujo was seen today for medication check.     Diagnoses and all orders for this visit:    High risk medication use  -     POCT Rapid Drug Screen    Tremor  -     CBC Auto Differential; Future  -     Lipid Panel; Future  -     TSH without Reflex; Future  -     Comprehensive Metabolic Panel; Future    Iron deficiency anemia, unspecified iron deficiency anemia type  -     CBC Auto Differential; Future  -     Lipid Panel; Future  -     TSH without Reflex; Future  -     Comprehensive Metabolic Panel; Future    Other orders  -     promethazine (PHENERGAN) 25 MG tablet; Take 1 tablet by mouth every 6 hours as needed for Nausea  -     propranolol (INDERAL) 40 MG tablet; Take 1 tablet by mouth 2 times daily          Return in about 4 months (around 8/26/2019) for medication check.      Orders Placed This Encounter   Procedures    CBC Auto Differential     Fast 12 hours     Standing Status:   Future     Standing Expiration Date:   12/26/2019    Lipid Panel     Standing Status:   Future     Standing Expiration Date:   12/26/2019     Order Specific Question:   Is Patient Fasting?/# of Hours     Answer:   12    TSH without Reflex     Fast 12 hours     Standing Status:   Future     Standing Expiration Date:   12/26/2019    Comprehensive Metabolic Panel     Fasting 12 hours     Standing Status:   Future     Standing Expiration Date:   12/26/2019    POCT Brittany Rod MD

## 2019-05-02 DIAGNOSIS — M54.5 LOW BACK PAIN, UNSPECIFIED BACK PAIN LATERALITY, UNSPECIFIED CHRONICITY, WITH SCIATICA PRESENCE UNSPECIFIED: ICD-10-CM

## 2019-05-02 RX ORDER — HYDROCODONE BITARTRATE AND ACETAMINOPHEN 10; 325 MG/1; MG/1
1 TABLET ORAL EVERY 8 HOURS PRN
Qty: 90 TABLET | Refills: 0 | Status: SHIPPED | OUTPATIENT
Start: 2019-06-05 | End: 2019-05-06 | Stop reason: SDUPTHER

## 2019-05-02 NOTE — TELEPHONE ENCOUNTER
RAE: 4/10/19  Last appt:4/26/2019  Next appt:8/30/2019  Requested Prescriptions     Pending Prescriptions Disp Refills    HYDROcodone-acetaminophen (NORCO)  MG per tablet 90 tablet 0     Sig: Take 1 tablet by mouth every 6 hours as needed for Pain (three times daily) for up to 30 days.

## 2019-05-06 DIAGNOSIS — M54.5 LOW BACK PAIN, UNSPECIFIED BACK PAIN LATERALITY, UNSPECIFIED CHRONICITY, WITH SCIATICA PRESENCE UNSPECIFIED: ICD-10-CM

## 2019-05-06 RX ORDER — HYDROCODONE BITARTRATE AND ACETAMINOPHEN 10; 325 MG/1; MG/1
1 TABLET ORAL EVERY 8 HOURS PRN
Qty: 90 TABLET | Refills: 0 | Status: SHIPPED | OUTPATIENT
Start: 2019-05-05 | End: 2019-06-03 | Stop reason: SDUPTHER

## 2019-05-06 RX ORDER — HYDROCODONE BITARTRATE AND ACETAMINOPHEN 10; 325 MG/1; MG/1
1 TABLET ORAL EVERY 8 HOURS PRN
Qty: 90 TABLET | Refills: 0 | Status: SHIPPED | OUTPATIENT
Start: 2019-06-05 | End: 2019-05-06 | Stop reason: SDUPTHER

## 2019-05-06 NOTE — TELEPHONE ENCOUNTER
Rx pt picked up today has a do not fill date of 6/5/19. He needs rx for now. Lila Hernandez 4/10/19. UDS 4/6/19. Med contract 9/2018. Last seen 4/26/19. FU 8/30/19. Rx pending.

## 2019-05-31 DIAGNOSIS — M54.5 LOW BACK PAIN, UNSPECIFIED BACK PAIN LATERALITY, UNSPECIFIED CHRONICITY, WITH SCIATICA PRESENCE UNSPECIFIED: ICD-10-CM

## 2019-05-31 DIAGNOSIS — F41.9 ANXIETY: ICD-10-CM

## 2019-06-03 RX ORDER — DIAZEPAM 10 MG/1
10 TABLET ORAL 2 TIMES DAILY PRN
Qty: 60 TABLET | Refills: 1 | Status: SHIPPED | OUTPATIENT
Start: 2019-06-03 | End: 2019-07-29 | Stop reason: SDUPTHER

## 2019-06-03 RX ORDER — HYDROCODONE BITARTRATE AND ACETAMINOPHEN 10; 325 MG/1; MG/1
1 TABLET ORAL EVERY 8 HOURS PRN
Qty: 90 TABLET | Refills: 0 | Status: SHIPPED | OUTPATIENT
Start: 2019-06-03 | End: 2019-07-29 | Stop reason: SDUPTHER

## 2019-07-29 DIAGNOSIS — M54.5 LOW BACK PAIN, UNSPECIFIED BACK PAIN LATERALITY, UNSPECIFIED CHRONICITY, WITH SCIATICA PRESENCE UNSPECIFIED: ICD-10-CM

## 2019-07-29 DIAGNOSIS — F41.9 ANXIETY: ICD-10-CM

## 2019-07-29 RX ORDER — DIAZEPAM 10 MG/1
10 TABLET ORAL 2 TIMES DAILY PRN
Qty: 60 TABLET | Refills: 1 | Status: SHIPPED | OUTPATIENT
Start: 2019-07-29 | End: 2019-08-30 | Stop reason: SDUPTHER

## 2019-07-29 RX ORDER — HYDROCODONE BITARTRATE AND ACETAMINOPHEN 10; 325 MG/1; MG/1
1 TABLET ORAL EVERY 8 HOURS PRN
Qty: 90 TABLET | Refills: 0 | Status: SHIPPED | OUTPATIENT
Start: 2019-07-29 | End: 2019-08-30 | Stop reason: SDUPTHER

## 2019-08-27 DIAGNOSIS — D50.9 IRON DEFICIENCY ANEMIA, UNSPECIFIED IRON DEFICIENCY ANEMIA TYPE: ICD-10-CM

## 2019-08-27 DIAGNOSIS — R25.1 TREMOR: ICD-10-CM

## 2019-08-27 LAB
ALBUMIN SERPL-MCNC: 4.3 G/DL (ref 3.5–5.2)
ALP BLD-CCNC: 50 U/L (ref 40–130)
ALT SERPL-CCNC: 28 U/L (ref 5–41)
ANION GAP SERPL CALCULATED.3IONS-SCNC: 12 MMOL/L (ref 7–19)
AST SERPL-CCNC: 34 U/L (ref 5–40)
BASOPHILS ABSOLUTE: 0.1 K/UL (ref 0–0.2)
BASOPHILS RELATIVE PERCENT: 1.6 % (ref 0–1)
BILIRUB SERPL-MCNC: <0.2 MG/DL (ref 0.2–1.2)
BUN BLDV-MCNC: 11 MG/DL (ref 6–20)
CALCIUM SERPL-MCNC: 9 MG/DL (ref 8.6–10)
CHLORIDE BLD-SCNC: 103 MMOL/L (ref 98–111)
CHOLESTEROL, TOTAL: 120 MG/DL (ref 160–199)
CO2: 28 MMOL/L (ref 22–29)
CREAT SERPL-MCNC: 1.1 MG/DL (ref 0.5–1.2)
EOSINOPHILS ABSOLUTE: 0.2 K/UL (ref 0–0.6)
EOSINOPHILS RELATIVE PERCENT: 3.7 % (ref 0–5)
GFR NON-AFRICAN AMERICAN: >60
GLUCOSE BLD-MCNC: 95 MG/DL (ref 74–109)
HCT VFR BLD CALC: 40.6 % (ref 42–52)
HDLC SERPL-MCNC: 51 MG/DL (ref 55–121)
HEMOGLOBIN: 13 G/DL (ref 14–18)
IMMATURE GRANULOCYTES #: 0 K/UL
LDL CHOLESTEROL CALCULATED: 47 MG/DL
LYMPHOCYTES ABSOLUTE: 1.9 K/UL (ref 1.1–4.5)
LYMPHOCYTES RELATIVE PERCENT: 37.3 % (ref 20–40)
MCH RBC QN AUTO: 29.7 PG (ref 27–31)
MCHC RBC AUTO-ENTMCNC: 32 G/DL (ref 33–37)
MCV RBC AUTO: 92.7 FL (ref 80–94)
MONOCYTES ABSOLUTE: 0.5 K/UL (ref 0–0.9)
MONOCYTES RELATIVE PERCENT: 10.5 % (ref 0–10)
NEUTROPHILS ABSOLUTE: 2.4 K/UL (ref 1.5–7.5)
NEUTROPHILS RELATIVE PERCENT: 46.7 % (ref 50–65)
PDW BLD-RTO: 13.6 % (ref 11.5–14.5)
PLATELET # BLD: 255 K/UL (ref 130–400)
PMV BLD AUTO: 9.8 FL (ref 9.4–12.4)
POTASSIUM SERPL-SCNC: 3.8 MMOL/L (ref 3.5–5)
RBC # BLD: 4.38 M/UL (ref 4.7–6.1)
SODIUM BLD-SCNC: 143 MMOL/L (ref 136–145)
TOTAL PROTEIN: 6.9 G/DL (ref 6.6–8.7)
TRIGL SERPL-MCNC: 109 MG/DL (ref 0–149)
TSH SERPL DL<=0.05 MIU/L-ACNC: 2.78 UIU/ML (ref 0.27–4.2)
WBC # BLD: 5.1 K/UL (ref 4.8–10.8)

## 2019-08-30 ENCOUNTER — OFFICE VISIT (OUTPATIENT)
Dept: INTERNAL MEDICINE | Age: 49
End: 2019-08-30
Payer: COMMERCIAL

## 2019-08-30 VITALS
DIASTOLIC BLOOD PRESSURE: 78 MMHG | BODY MASS INDEX: 20.32 KG/M2 | HEIGHT: 72 IN | SYSTOLIC BLOOD PRESSURE: 120 MMHG | HEART RATE: 76 BPM | WEIGHT: 150 LBS | OXYGEN SATURATION: 98 %

## 2019-08-30 DIAGNOSIS — M54.5 LOW BACK PAIN, UNSPECIFIED BACK PAIN LATERALITY, UNSPECIFIED CHRONICITY, WITH SCIATICA PRESENCE UNSPECIFIED: ICD-10-CM

## 2019-08-30 DIAGNOSIS — F41.9 ANXIETY: ICD-10-CM

## 2019-08-30 DIAGNOSIS — R25.1 TREMOR: ICD-10-CM

## 2019-08-30 DIAGNOSIS — I10 ESSENTIAL HYPERTENSION: ICD-10-CM

## 2019-08-30 DIAGNOSIS — K21.9 GASTROESOPHAGEAL REFLUX DISEASE WITHOUT ESOPHAGITIS: Primary | ICD-10-CM

## 2019-08-30 DIAGNOSIS — M54.2 NECK PAIN: ICD-10-CM

## 2019-08-30 PROCEDURE — 99213 OFFICE O/P EST LOW 20 MIN: CPT | Performed by: INTERNAL MEDICINE

## 2019-08-30 RX ORDER — PROMETHAZINE HYDROCHLORIDE 25 MG/1
25 TABLET ORAL EVERY 6 HOURS PRN
Qty: 30 TABLET | Refills: 5 | Status: SHIPPED | OUTPATIENT
Start: 2019-08-30 | End: 2020-04-09

## 2019-08-30 RX ORDER — PANTOPRAZOLE SODIUM 40 MG/1
40 TABLET, DELAYED RELEASE ORAL
Qty: 30 TABLET | Refills: 1 | Status: SHIPPED | OUTPATIENT
Start: 2019-08-30 | End: 2019-10-25 | Stop reason: SDUPTHER

## 2019-08-30 RX ORDER — HYDROCODONE BITARTRATE AND ACETAMINOPHEN 10; 325 MG/1; MG/1
1 TABLET ORAL EVERY 8 HOURS PRN
Qty: 90 TABLET | Refills: 0 | Status: SHIPPED | OUTPATIENT
Start: 2019-08-30 | End: 2019-09-26 | Stop reason: SDUPTHER

## 2019-08-30 RX ORDER — DIAZEPAM 10 MG/1
10 TABLET ORAL 2 TIMES DAILY PRN
Qty: 60 TABLET | Refills: 1 | Status: SHIPPED | OUTPATIENT
Start: 2019-08-30 | End: 2020-01-14

## 2019-08-31 ASSESSMENT — ENCOUNTER SYMPTOMS
DIARRHEA: 0
VOICE CHANGE: 0
ABDOMINAL PAIN: 0
SHORTNESS OF BREATH: 0
BLOOD IN STOOL: 0
TROUBLE SWALLOWING: 0
EYE PAIN: 0
ABDOMINAL DISTENTION: 0
WHEEZING: 0
CHEST TIGHTNESS: 0
VOMITING: 0
PHOTOPHOBIA: 0
COUGH: 0
COLOR CHANGE: 0
EYE ITCHING: 0
CONSTIPATION: 0
BACK PAIN: 1
NAUSEA: 0
EYE REDNESS: 0
RHINORRHEA: 0
EYE DISCHARGE: 0
SORE THROAT: 0
SINUS PRESSURE: 0

## 2019-08-31 NOTE — PROGRESS NOTES
Chief Complaint   Patient presents with    Medication Check    Gastroesophageal Reflux     Pt has been having increased reflux. HPI: Colette Barrett is a 44 y.o. male is here for evaluation of chronic back and neck pain. His back and neck pain are stable. His meds seem to work well. His major concern today is that his acid reflux is getting worse. He no longer thinks the omeprazole is doing well. He wants to know if he can try something different. He is agreeable to try Protonix. He does have a tremor, which is well controlled on Inderal.  His anxiety is stable on the Valium. He has no other concerns or complaints today.     Past Medical History:   Diagnosis Date    Allergic rhinitis     Cellulitis     Gastroparesis     GERD (gastroesophageal reflux disease)       Past Surgical History:   Procedure Laterality Date    CHOLECYSTECTOMY      AL COLONOSCOPY FLX DX W/COLLJ SPEC WHEN PFRMD N/A 2018    Dr Aggie Martinez, 10 yr recall      Social History     Socioeconomic History    Marital status:      Spouse name: Not on file    Number of children: Not on file    Years of education: Not on file    Highest education level: Not on file   Occupational History    Not on file   Social Needs    Financial resource strain: Not on file    Food insecurity:     Worry: Not on file     Inability: Not on file    Transportation needs:     Medical: Not on file     Non-medical: Not on file   Tobacco Use    Smoking status: Former Smoker     Types: Cigarettes     Last attempt to quit: 12/15/1990     Years since quittin.7    Smokeless tobacco: Never Used   Substance and Sexual Activity    Alcohol use: No    Drug use: No    Sexual activity: Not on file   Lifestyle    Physical activity:     Days per week: Not on file     Minutes per session: Not on file    Stress: Not on file   Relationships    Social connections:     Talks on phone: Not on file     Gets together: Not on file     Attends unexpected weight change. HENT: Negative for congestion, ear pain, hearing loss, nosebleeds, postnasal drip, rhinorrhea, sinus pressure, sneezing, sore throat, tinnitus, trouble swallowing and voice change. Eyes: Negative for photophobia, pain, discharge, redness, itching and visual disturbance. Respiratory: Negative for cough, chest tightness, shortness of breath and wheezing. Cardiovascular: Negative for chest pain, palpitations and leg swelling. Gastrointestinal: Negative for abdominal distention, abdominal pain, blood in stool, constipation, diarrhea, nausea and vomiting. Reflux   Endocrine: Negative for cold intolerance, heat intolerance, polydipsia, polyphagia and polyuria. Genitourinary: Negative for difficulty urinating, dysuria, enuresis, frequency, hematuria and urgency. Musculoskeletal: Positive for back pain and neck pain. Chronic low back and neck pain   Skin: Negative for color change, pallor, rash and wound. Allergic/Immunologic: Positive for environmental allergies. Negative for food allergies and immunocompromised state. Neurological: Positive for tremors. Negative for dizziness, syncope, speech difficulty, weakness, light-headedness, numbness and headaches. Psychiatric/Behavioral: Negative for agitation, behavioral problems, confusion, decreased concentration, dysphoric mood, hallucinations, self-injury, sleep disturbance and suicidal ideas. The patient is nervous/anxious. The patient is not hyperactive. Anxiety is stable       /78   Pulse 76   Ht 6' (1.829 m)   Wt 150 lb (68 kg)   SpO2 98%   BMI 20.34 kg/m²   Physical Exam   Constitutional: He is oriented to person, place, and time. He appears well-developed and well-nourished. No distress. HENT:   Head: Normocephalic and atraumatic. Right Ear: External ear normal.   Left Ear: External ear normal.   Nose: Nose normal.   Mouth/Throat: Oropharynx is clear and moist. No oropharyngeal exudate. Eyes: Pupils are equal, round, and reactive to light. Conjunctivae and EOM are normal. Right eye exhibits no discharge. Left eye exhibits no discharge. No scleral icterus. Neck: Normal range of motion. Neck supple. No JVD present. No tracheal deviation present. No thyromegaly present. Cardiovascular: Normal rate, regular rhythm, normal heart sounds and intact distal pulses. Exam reveals no gallop and no friction rub. No murmur heard. Pulmonary/Chest: Effort normal and breath sounds normal. No respiratory distress. He has no wheezes. He has no rales. He exhibits no tenderness. Abdominal: Soft. Bowel sounds are normal. He exhibits no distension and no mass. There is no tenderness. There is no rebound and no guarding. Musculoskeletal: Normal range of motion. He exhibits tenderness. He exhibits no edema or deformity. Gait antalgic. There is tenderness over the lower lumbar spine   Lymphadenopathy:     He has no cervical adenopathy. Neurological: He is alert and oriented to person, place, and time. He has normal reflexes. He displays normal reflexes. No cranial nerve deficit. He exhibits normal muscle tone. Coordination normal.   Tremor to hands noted   Skin: Skin is warm and dry. No rash noted. He is not diaphoretic. No erythema. No pallor. Psychiatric: He has a normal mood and affect. His behavior is normal. Judgment and thought content normal.   Nursing note and vitals reviewed. 1. Essential hypertension    2. Low back pain, unspecified back pain laterality, unspecified chronicity, with sciatica presence unspecified    3. Anxiety        ASSESSMENT/PLAN:    58-year-old male here for follow-up    1. Acid reflux: Discontinue omeprazole and try Protonix    2. Back and neck pain: Stable    3. Tremor: Continue medications as prescribed    4. Anxiety: Stable    Eleazar was seen today for medication check and gastroesophageal reflux.     Diagnoses and all orders for this visit:    Essential hypertension  -     CBC Auto Differential; Future  -     Comprehensive Metabolic Panel; Future  -     Lipid Panel; Future  -     TSH without Reflex; Future  -     Psa screening; Future    Low back pain, unspecified back pain laterality, unspecified chronicity, with sciatica presence unspecified  -     HYDROcodone-acetaminophen (NORCO)  MG per tablet; Take 1 tablet by mouth every 8 hours as needed for Pain (three times daily) for up to 30 days. Anxiety  -     diazepam (VALIUM) 10 MG tablet; Take 1 tablet by mouth 2 times daily as needed for Anxiety for up to 30 days. Other orders  -     promethazine (PHENERGAN) 25 MG tablet; Take 1 tablet by mouth every 6 hours as needed for Nausea  -     pantoprazole (PROTONIX) 40 MG tablet;  Take 1 tablet by mouth every morning (before breakfast)          Return in about 4 months (around 12/16/2019), or physical.     Orders Placed This Encounter   Procedures    CBC Auto Differential     Fast 12 hours     Standing Status:   Future     Standing Expiration Date:   8/29/2020    Comprehensive Metabolic Panel     Fasting 12 hours     Standing Status:   Future     Standing Expiration Date:   8/29/2020    Lipid Panel     Standing Status:   Future     Standing Expiration Date:   8/29/2020     Order Specific Question:   Is Patient Fasting?/# of Hours     Answer:   12    TSH without Reflex     Fast 12 hours     Standing Status:   Future     Standing Expiration Date:   8/29/2020    Psa screening     Standing Status:   Future     Standing Expiration Date:   8/29/2020       Kiara Luna MD

## 2019-09-26 DIAGNOSIS — M54.5 LOW BACK PAIN, UNSPECIFIED BACK PAIN LATERALITY, UNSPECIFIED CHRONICITY, WITH SCIATICA PRESENCE UNSPECIFIED: ICD-10-CM

## 2019-09-26 RX ORDER — HYDROCODONE BITARTRATE AND ACETAMINOPHEN 10; 325 MG/1; MG/1
1 TABLET ORAL EVERY 8 HOURS PRN
Qty: 90 TABLET | Refills: 0 | Status: SHIPPED | OUTPATIENT
Start: 2019-09-26 | End: 2019-10-22 | Stop reason: SDUPTHER

## 2019-10-22 DIAGNOSIS — M54.50 LOW BACK PAIN: ICD-10-CM

## 2019-10-23 RX ORDER — HYDROCODONE BITARTRATE AND ACETAMINOPHEN 10; 325 MG/1; MG/1
TABLET ORAL
Qty: 90 TABLET | Refills: 0 | Status: SHIPPED | OUTPATIENT
Start: 2019-10-23 | End: 2019-11-19 | Stop reason: SDUPTHER

## 2019-10-25 RX ORDER — PANTOPRAZOLE SODIUM 40 MG/1
40 TABLET, DELAYED RELEASE ORAL
Qty: 30 TABLET | Refills: 5 | Status: SHIPPED | OUTPATIENT
Start: 2019-10-25 | End: 2020-05-08

## 2019-11-19 DIAGNOSIS — F41.9 ANXIETY DISORDER, UNSPECIFIED TYPE: Primary | ICD-10-CM

## 2019-11-19 DIAGNOSIS — M54.50 LOW BACK PAIN: ICD-10-CM

## 2019-11-19 RX ORDER — DIAZEPAM 10 MG/1
TABLET ORAL
Qty: 60 TABLET | Refills: 1 | Status: SHIPPED | OUTPATIENT
Start: 2019-11-19 | End: 2020-01-03 | Stop reason: SDUPTHER

## 2019-11-19 RX ORDER — HYDROCODONE BITARTRATE AND ACETAMINOPHEN 10; 325 MG/1; MG/1
TABLET ORAL
Qty: 90 TABLET | Refills: 0 | Status: SHIPPED | OUTPATIENT
Start: 2019-11-19 | End: 2019-12-19

## 2019-12-18 DIAGNOSIS — M54.50 LOW BACK PAIN: ICD-10-CM

## 2019-12-19 RX ORDER — HYDROCODONE BITARTRATE AND ACETAMINOPHEN 10; 325 MG/1; MG/1
TABLET ORAL
Qty: 90 TABLET | Refills: 0 | Status: SHIPPED | OUTPATIENT
Start: 2019-12-19 | End: 2020-01-14

## 2020-01-03 ENCOUNTER — OFFICE VISIT (OUTPATIENT)
Dept: INTERNAL MEDICINE | Age: 50
End: 2020-01-03

## 2020-01-03 VITALS
WEIGHT: 157 LBS | HEART RATE: 62 BPM | HEIGHT: 72 IN | SYSTOLIC BLOOD PRESSURE: 121 MMHG | DIASTOLIC BLOOD PRESSURE: 77 MMHG | OXYGEN SATURATION: 98 % | BODY MASS INDEX: 21.26 KG/M2

## 2020-01-03 DIAGNOSIS — I10 ESSENTIAL HYPERTENSION: ICD-10-CM

## 2020-01-03 LAB
ALBUMIN SERPL-MCNC: 4.2 G/DL (ref 3.5–5.2)
ALP BLD-CCNC: 43 U/L (ref 40–130)
ALT SERPL-CCNC: 26 U/L (ref 5–41)
ANION GAP SERPL CALCULATED.3IONS-SCNC: 12 MMOL/L (ref 7–19)
AST SERPL-CCNC: 33 U/L (ref 5–40)
BASOPHILS ABSOLUTE: 0.1 K/UL (ref 0–0.2)
BASOPHILS RELATIVE PERCENT: 1.7 % (ref 0–1)
BILIRUB SERPL-MCNC: <0.2 MG/DL (ref 0.2–1.2)
BUN BLDV-MCNC: 16 MG/DL (ref 6–20)
CALCIUM SERPL-MCNC: 8.9 MG/DL (ref 8.6–10)
CHLORIDE BLD-SCNC: 105 MMOL/L (ref 98–111)
CHOLESTEROL, TOTAL: 136 MG/DL (ref 160–199)
CO2: 28 MMOL/L (ref 22–29)
CREAT SERPL-MCNC: 1 MG/DL (ref 0.5–1.2)
EOSINOPHILS ABSOLUTE: 0.2 K/UL (ref 0–0.6)
EOSINOPHILS RELATIVE PERCENT: 3.3 % (ref 0–5)
GFR NON-AFRICAN AMERICAN: >60
GLUCOSE BLD-MCNC: 105 MG/DL (ref 74–109)
HCT VFR BLD CALC: 40.9 % (ref 42–52)
HDLC SERPL-MCNC: 65 MG/DL (ref 55–121)
HEMOGLOBIN: 12.9 G/DL (ref 14–18)
IMMATURE GRANULOCYTES #: 0 K/UL
LDL CHOLESTEROL CALCULATED: 55 MG/DL
LYMPHOCYTES ABSOLUTE: 1.4 K/UL (ref 1.1–4.5)
LYMPHOCYTES RELATIVE PERCENT: 28.4 % (ref 20–40)
MCH RBC QN AUTO: 30.4 PG (ref 27–31)
MCHC RBC AUTO-ENTMCNC: 31.5 G/DL (ref 33–37)
MCV RBC AUTO: 96.2 FL (ref 80–94)
MONOCYTES ABSOLUTE: 0.5 K/UL (ref 0–0.9)
MONOCYTES RELATIVE PERCENT: 10.4 % (ref 0–10)
NEUTROPHILS ABSOLUTE: 2.7 K/UL (ref 1.5–7.5)
NEUTROPHILS RELATIVE PERCENT: 56 % (ref 50–65)
PDW BLD-RTO: 13.7 % (ref 11.5–14.5)
PLATELET # BLD: 229 K/UL (ref 130–400)
PMV BLD AUTO: 9.7 FL (ref 9.4–12.4)
POTASSIUM SERPL-SCNC: 4.6 MMOL/L (ref 3.5–5)
PROSTATE SPECIFIC ANTIGEN: 2.05 NG/ML (ref 0–4)
RBC # BLD: 4.25 M/UL (ref 4.7–6.1)
SODIUM BLD-SCNC: 145 MMOL/L (ref 136–145)
TOTAL PROTEIN: 6.5 G/DL (ref 6.6–8.7)
TRIGL SERPL-MCNC: 81 MG/DL (ref 0–149)
TSH SERPL DL<=0.05 MIU/L-ACNC: 1.04 UIU/ML (ref 0.27–4.2)
WBC # BLD: 4.8 K/UL (ref 4.8–10.8)

## 2020-01-03 PROCEDURE — 99396 PREV VISIT EST AGE 40-64: CPT | Performed by: INTERNAL MEDICINE

## 2020-01-03 ASSESSMENT — PATIENT HEALTH QUESTIONNAIRE - PHQ9
SUM OF ALL RESPONSES TO PHQ QUESTIONS 1-9: 0
SUM OF ALL RESPONSES TO PHQ QUESTIONS 1-9: 0
2. FEELING DOWN, DEPRESSED OR HOPELESS: 0
1. LITTLE INTEREST OR PLEASURE IN DOING THINGS: 0
SUM OF ALL RESPONSES TO PHQ9 QUESTIONS 1 & 2: 0

## 2020-01-03 ASSESSMENT — ENCOUNTER SYMPTOMS
DIARRHEA: 0
SORE THROAT: 0
TROUBLE SWALLOWING: 0
COUGH: 0
BLOOD IN STOOL: 0
SHORTNESS OF BREATH: 0
EYE PAIN: 0
EYE ITCHING: 0
SINUS PRESSURE: 0
VOMITING: 0
VOICE CHANGE: 0
CONSTIPATION: 0
PHOTOPHOBIA: 0
ABDOMINAL DISTENTION: 0
NAUSEA: 0
RHINORRHEA: 0
BACK PAIN: 1
EYE REDNESS: 0
CHEST TIGHTNESS: 0
ABDOMINAL PAIN: 0
WHEEZING: 0
EYE DISCHARGE: 0
COLOR CHANGE: 0

## 2020-01-03 NOTE — PATIENT INSTRUCTIONS
Patient Education        Benign Essential Tremor: Care Instructions  Your Care Instructions    Benign essential tremor is a medical term for shaking that you can't control. Your hand or fingers may shake when you lift a cup or point at something. Or your voice may shake when you speak. This type of tremor is not harmful. It is not caused by a stroke or Parkinson's disease. Some things can affect how much you shake. For example, drinking or eating something with caffeine may make tremors worse for a while. Some medicines also can increase tremors. These include antidepressants and too much thyroid replacement. Talk to your doctor if you think one of your medicines makes your tremors worse. If you are self-conscious about your tremors, there are some things you can do to reduce them or make them less noticeable. This includes taking medicine. Follow-up care is a key part of your treatment and safety. Be sure to make and go to all appointments, and call your doctor if you are having problems. It's also a good idea to know your test results and keep a list of the medicines you take. How can you care for yourself at home? · Take your medicines exactly as prescribed. Call your doctor if you think you are having a problem with your medicine. Some medicines that help control tremors have to be taken every day, even if you are not having tremors. You will get more details on the specific medicines your doctor prescribes. · Get plenty of rest.  · Eat a balanced, healthy diet. · Try to reduce stress. Regular exercise and massages may help. · Limit alcohol. Heavy drinking can make your tremors worse. · Avoid drinks or foods with caffeine if they make your tremors worse. These include tea, cola, coffee, and chocolate. · Wear a heavy bracelet or watch. This adds a little weight to your hand. The extra weight may reduce tremors. · Drink from cups or glasses that are only half full.  You may also want to try drinking with a straw. When should you call for help? Watch closely for changes in your health, and be sure to contact your doctor if:    · You notice your tremors are getting worse.     · You can't do your everyday activities because of your tremors.     · You are sad and embarrassed about your shaking. Where can you learn more? Go to https://[email or web address].[email or web address]. org and sign in to your [facility] account. Enter B746 in the [facility] box to learn more about \"Benign Essential Tremor: Care Instructions. \"     If you do not have an account, please click on the \"Sign Up Now\" link. Current as of: March 28, 2019  Content Version: 12.1  © 9396-0717 [employer]. Care instructions adapted under license by [facility] ([facility]). If you have questions about a medical condition or this instruction, always ask your healthcare professional. [place] [address] any warranty or liability for your use of this information. Patient Education        Raynaud's Phenomenon: Care Instructions  Overview  Raynaud's is a condition that causes your hands and feet to overreact to cold. They may become painful and numb, and they can change colors, becoming very pale and then blue. This condition also is called Raynaud's phenomenon. There are two kinds of Raynaud's. Primary Raynaud's, also known as Raynaud's disease, happens by itself and is the most common form. Secondary Raynaud's, also called Raynaud's syndrome, happens as part of another disease. In Raynaud's, the small vessels that bring blood to the skin either become narrow, or constrict for a short period of time. This limits blood flow to the hands and feet and sometimes to the nose or ears. Your hands and feet feel cold and numb and then turn very pale. As blood flow returns, your fingers and toes may turn red, and begin to throb and hurt. Raynaud's can be painful and annoying, but it usually does not cause serious problems.   You can take simple
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
## 2020-01-14 RX ORDER — HYDROCODONE BITARTRATE AND ACETAMINOPHEN 10; 325 MG/1; MG/1
TABLET ORAL
Qty: 90 TABLET | Refills: 0 | Status: SHIPPED | OUTPATIENT
Start: 2020-01-14 | End: 2020-02-11

## 2020-01-14 RX ORDER — DIAZEPAM 10 MG/1
TABLET ORAL
Qty: 60 TABLET | Refills: 1 | Status: SHIPPED | OUTPATIENT
Start: 2020-01-14 | End: 2020-03-10

## 2020-01-14 NOTE — TELEPHONE ENCOUNTER
Valente Chacon called to request a refill on his medication. Last office visit : 1/3/2020   Next office visit : 5/8/2020     Last UDS:   Amphetamine Screen, Urine   Date Value Ref Range Status   04/26/2019 NEGATIVE  Final     Barbiturate Screen, Urine   Date Value Ref Range Status   04/26/2019 NEGATIVE  Final     Benzodiazepine Screen, Urine   Date Value Ref Range Status   04/26/2019 POSITIVE  Final     Comment:     DIAZEPAM      Buprenorphine Urine   Date Value Ref Range Status   04/26/2019 NEGATIVE  Final     Cocaine Metabolite Screen, Urine   Date Value Ref Range Status   04/26/2019 NEGATIVE  Final     Gabapentin Screen, Urine   Date Value Ref Range Status   04/26/2019 NEGATIVE  Final     MDMA, Urine   Date Value Ref Range Status   04/26/2019 NEGATIVE  Final     Methamphetamine, Urine   Date Value Ref Range Status   04/26/2019 NEGATIVE  Final     Opiate Scrn, Ur   Date Value Ref Range Status   04/26/2019 POSITIVE  Final     Comment:     HYDROCODONE     Oxycodone Screen, Ur   Date Value Ref Range Status   04/26/2019 NEGATIVE  Final     PCP Screen, Urine   Date Value Ref Range Status   04/26/2019 NEGATIVE  Final     Propoxyphene Screen, Urine   Date Value Ref Range Status   04/26/2019 NEGATIVE  Final     THC Screen, Urine   Date Value Ref Range Status   04/26/2019 NEGATIVE  Final     Tricyclic Antidepressants, Urine   Date Value Ref Range Status   04/26/2019 NEGATIVE  Final       Last Finn Zapata: 12/26/19  Medication Contract: 01/03/20      Requested Prescriptions     Pending Prescriptions Disp Refills    diazepam (VALIUM) 10 MG tablet [Pharmacy Med Name: diazePAM 10 MG TABS 10 TAB] 60 tablet 1     Sig: TAKE 1 TABLET BY MOUTH 2 TIMES DAILY AS NEEDED FOR ANXIETY.     HYDROcodone-acetaminophen (NORCO)  MG per tablet [Pharmacy Med Name: HYDROC/APAP   TAB] 90 tablet 0     Sig: TAKE 1 TABLET BY MOUTH EVERY 8 HOURS AS NEEDED FOR PAIN (THREE TIMES A DAY)         Please approve or refuse this

## 2020-02-11 RX ORDER — HYDROCODONE BITARTRATE AND ACETAMINOPHEN 10; 325 MG/1; MG/1
TABLET ORAL
Qty: 90 TABLET | Refills: 0 | Status: SHIPPED | OUTPATIENT
Start: 2020-02-11 | End: 2020-03-10

## 2020-02-11 NOTE — TELEPHONE ENCOUNTER
Farida Forte called to request a refill on his medication. Last office visit : 1/3/2020   Next office visit : 5/8/2020     Last UDS:   Amphetamine Screen, Urine   Date Value Ref Range Status   04/26/2019 NEGATIVE  Final     Barbiturate Screen, Urine   Date Value Ref Range Status   04/26/2019 NEGATIVE  Final     Benzodiazepine Screen, Urine   Date Value Ref Range Status   04/26/2019 POSITIVE  Final     Comment:     DIAZEPAM      Buprenorphine Urine   Date Value Ref Range Status   04/26/2019 NEGATIVE  Final     Cocaine Metabolite Screen, Urine   Date Value Ref Range Status   04/26/2019 NEGATIVE  Final     Gabapentin Screen, Urine   Date Value Ref Range Status   04/26/2019 NEGATIVE  Final     MDMA, Urine   Date Value Ref Range Status   04/26/2019 NEGATIVE  Final     Methamphetamine, Urine   Date Value Ref Range Status   04/26/2019 NEGATIVE  Final     Opiate Scrn, Ur   Date Value Ref Range Status   04/26/2019 POSITIVE  Final     Comment:     HYDROCODONE     Oxycodone Screen, Ur   Date Value Ref Range Status   04/26/2019 NEGATIVE  Final     PCP Screen, Urine   Date Value Ref Range Status   04/26/2019 NEGATIVE  Final     Propoxyphene Screen, Urine   Date Value Ref Range Status   04/26/2019 NEGATIVE  Final     THC Screen, Urine   Date Value Ref Range Status   04/26/2019 NEGATIVE  Final     Tricyclic Antidepressants, Urine   Date Value Ref Range Status   04/26/2019 NEGATIVE  Final       Last Windsor Sumpter: 12/26/2019  Medication Contract: 01/03/2020    Requested Prescriptions     Pending Prescriptions Disp Refills    HYDROcodone-acetaminophen ([facility])  MG per tablet [Pharmacy Med Name: HYDROC/APAP   TAB] 90 tablet 0     Sig: TAKE 1 TABLET BY MOUTH EVERY 8 HOURS AS NEEDED FOR PAIN (THREE TIMES A DAY)         Please approve or refuse this medication.    Bud Height

## 2020-03-10 RX ORDER — DIAZEPAM 10 MG/1
TABLET ORAL
Qty: 60 TABLET | Refills: 1 | Status: SHIPPED | OUTPATIENT
Start: 2020-03-10 | End: 2020-05-07

## 2020-03-10 RX ORDER — HYDROCODONE BITARTRATE AND ACETAMINOPHEN 10; 325 MG/1; MG/1
TABLET ORAL
Qty: 90 TABLET | Refills: 0 | Status: SHIPPED | OUTPATIENT
Start: 2020-03-10 | End: 2020-04-09

## 2020-03-10 NOTE — TELEPHONE ENCOUNTER
Griselda Webster called requesting a refill of the below medication which has been pended for you:     Requested Prescriptions     Pending Prescriptions Disp Refills    HYDROcodone-acetaminophen ([address])  MG per tablet [Pharmacy Med Name: HYDROC/APAP   TAB] 90 tablet 0     Sig: TAKE 1 TABLET BY MOUTH EVERY 8 HOURS AS NEEDED FOR PAIN (THREE TIMES A DAY)    diazePAM (VALIUM) 10 MG tablet [Pharmacy Med Name: diazePAM 10 MG TABS 10 TAB] 60 tablet 1     Sig: TAKE 1 TABLET BY MOUTH 2 TIMES DAILY AS NEEDED FOR ANXIETY.        Last Appointment Date: 1/3/2020  Next Appointment Date: 5/8/2020    No Known Allergies
[place]

## 2020-04-09 RX ORDER — HYDROCODONE BITARTRATE AND ACETAMINOPHEN 10; 325 MG/1; MG/1
TABLET ORAL
Qty: 90 TABLET | Refills: 0 | Status: SHIPPED | OUTPATIENT
Start: 2020-04-09 | End: 2020-05-06

## 2020-04-09 RX ORDER — PROMETHAZINE HYDROCHLORIDE 25 MG/1
25 TABLET ORAL EVERY 6 HOURS PRN
Qty: 30 TABLET | Refills: 5 | Status: SHIPPED | OUTPATIENT
Start: 2020-04-09 | End: 2020-11-12

## 2020-05-06 NOTE — TELEPHONE ENCOUNTER
Avery Hoffman called to request a refill on his medication. Last office visit : 1/3/2020   Next office visit : 5/8/2020     Last UDS:   Amphetamine Screen, Urine   Date Value Ref Range Status   04/26/2019 NEGATIVE  Final     Barbiturate Screen, Urine   Date Value Ref Range Status   04/26/2019 NEGATIVE  Final     Benzodiazepine Screen, Urine   Date Value Ref Range Status   04/26/2019 POSITIVE  Final     Comment:     DIAZEPAM      Buprenorphine Urine   Date Value Ref Range Status   04/26/2019 NEGATIVE  Final     Cocaine Metabolite Screen, Urine   Date Value Ref Range Status   04/26/2019 NEGATIVE  Final     Gabapentin Screen, Urine   Date Value Ref Range Status   04/26/2019 NEGATIVE  Final     MDMA, Urine   Date Value Ref Range Status   04/26/2019 NEGATIVE  Final     Methamphetamine, Urine   Date Value Ref Range Status   04/26/2019 NEGATIVE  Final     Opiate Scrn, Ur   Date Value Ref Range Status   04/26/2019 POSITIVE  Final     Comment:     HYDROCODONE     Oxycodone Screen, Ur   Date Value Ref Range Status   04/26/2019 NEGATIVE  Final     PCP Screen, Urine   Date Value Ref Range Status   04/26/2019 NEGATIVE  Final     Propoxyphene Screen, Urine   Date Value Ref Range Status   04/26/2019 NEGATIVE  Final     THC Screen, Urine   Date Value Ref Range Status   04/26/2019 NEGATIVE  Final     Tricyclic Antidepressants, Urine   Date Value Ref Range Status   04/26/2019 NEGATIVE  Final     Last Susanna Finical: 4/9/20  Medication Contract: 1/3/20    Requested Prescriptions     Pending Prescriptions Disp Refills    diazePAM (VALIUM) 10 MG tablet [Pharmacy Med Name: diazePAM 10 MG TABS 10 TAB] 60 tablet 1     Sig: TAKE 1 TABLET BY MOUTH 2 TIMES DAILY AS NEEDED FOR ANXIETY. Please approve or refuse this medication.    Mo Tran

## 2020-05-07 DIAGNOSIS — D64.9 ANEMIA, UNSPECIFIED TYPE: ICD-10-CM

## 2020-05-07 LAB
BASOPHILS ABSOLUTE: 0.1 K/UL (ref 0–0.2)
BASOPHILS RELATIVE PERCENT: 1.6 % (ref 0–1)
EOSINOPHILS ABSOLUTE: 0.2 K/UL (ref 0–0.6)
EOSINOPHILS RELATIVE PERCENT: 3.7 % (ref 0–5)
HCT VFR BLD CALC: 43.9 % (ref 42–52)
HEMOGLOBIN: 14.2 G/DL (ref 14–18)
IMMATURE GRANULOCYTES #: 0 K/UL
LYMPHOCYTES ABSOLUTE: 1.3 K/UL (ref 1.1–4.5)
LYMPHOCYTES RELATIVE PERCENT: 29.1 % (ref 20–40)
MCH RBC QN AUTO: 30 PG (ref 27–31)
MCHC RBC AUTO-ENTMCNC: 32.3 G/DL (ref 33–37)
MCV RBC AUTO: 92.6 FL (ref 80–94)
MONOCYTES ABSOLUTE: 0.5 K/UL (ref 0–0.9)
MONOCYTES RELATIVE PERCENT: 11.2 % (ref 0–10)
NEUTROPHILS ABSOLUTE: 2.4 K/UL (ref 1.5–7.5)
NEUTROPHILS RELATIVE PERCENT: 54.2 % (ref 50–65)
PDW BLD-RTO: 13.2 % (ref 11.5–14.5)
PLATELET # BLD: 247 K/UL (ref 130–400)
PMV BLD AUTO: 9.9 FL (ref 9.4–12.4)
RBC # BLD: 4.74 M/UL (ref 4.7–6.1)
WBC # BLD: 4.4 K/UL (ref 4.8–10.8)

## 2020-05-07 RX ORDER — HYDROCODONE BITARTRATE AND ACETAMINOPHEN 10; 325 MG/1; MG/1
TABLET ORAL
Qty: 90 TABLET | Refills: 0 | Status: SHIPPED | OUTPATIENT
Start: 2020-05-07 | End: 2020-06-05

## 2020-05-07 RX ORDER — DIAZEPAM 10 MG/1
TABLET ORAL
Qty: 60 TABLET | Refills: 1 | Status: SHIPPED | OUTPATIENT
Start: 2020-05-07 | End: 2020-07-02

## 2020-05-08 ENCOUNTER — OFFICE VISIT (OUTPATIENT)
Dept: INTERNAL MEDICINE | Age: 50
End: 2020-05-08

## 2020-05-08 VITALS
SYSTOLIC BLOOD PRESSURE: 108 MMHG | WEIGHT: 164 LBS | BODY MASS INDEX: 22.24 KG/M2 | DIASTOLIC BLOOD PRESSURE: 72 MMHG | HEART RATE: 76 BPM

## 2020-05-08 PROCEDURE — 99214 OFFICE O/P EST MOD 30 MIN: CPT | Performed by: INTERNAL MEDICINE

## 2020-05-08 RX ORDER — PROPRANOLOL HYDROCHLORIDE 40 MG/1
40 TABLET ORAL 2 TIMES DAILY
Qty: 180 TABLET | Refills: 3 | Status: SHIPPED | OUTPATIENT
Start: 2020-05-08 | End: 2021-05-06

## 2020-05-08 RX ORDER — PANTOPRAZOLE SODIUM 40 MG/1
40 TABLET, DELAYED RELEASE ORAL
Qty: 30 TABLET | Refills: 5 | Status: SHIPPED | OUTPATIENT
Start: 2020-05-08 | End: 2020-11-25

## 2020-05-08 ASSESSMENT — ENCOUNTER SYMPTOMS
SINUS PRESSURE: 0
BLOOD IN STOOL: 0
VOICE CHANGE: 0
BACK PAIN: 1
PHOTOPHOBIA: 0
COLOR CHANGE: 0
NAUSEA: 0
EYE DISCHARGE: 0
CHEST TIGHTNESS: 0
EYE REDNESS: 0
RHINORRHEA: 0
CONSTIPATION: 0
VOMITING: 0
ABDOMINAL PAIN: 0
DIARRHEA: 0
SORE THROAT: 0
ABDOMINAL DISTENTION: 0
EYE ITCHING: 0
TROUBLE SWALLOWING: 0
COUGH: 0
EYE PAIN: 0
SHORTNESS OF BREATH: 0
WHEEZING: 0

## 2020-05-08 NOTE — PROGRESS NOTES
Chief Complaint   Patient presents with    Follow-up       HPI: Israel Person is a 44 y.o. male is here for valuation of anemia, acid reflux, anxiety, chronic back pain and tremors. His anemia has actually improved. He has been eating meat in his diet. He denies a complaints of blood in stool or abdominal pain. He does have a history of acid reflux, which is well controlled on Protonix. His tremors are stable with propanolol. He thinks the combination of Valium and propanolol do help his tremors. Valium is helpful for his anxiety. Is also helpful for back spasms. His medications do keep his back pain well controlled. He really has no major concerns or complaints today.     Past Medical History:   Diagnosis Date    Allergic rhinitis     Cellulitis     Gastroparesis     GERD (gastroesophageal reflux disease)       Past Surgical History:   Procedure Laterality Date    CHOLECYSTECTOMY      CT COLONOSCOPY FLX DX W/COLLJ SPEC WHEN PFRMD N/A 2018    Dr Charu Charles, 10 yr recall      Social History     Socioeconomic History    Marital status:      Spouse name: Not on file    Number of children: Not on file    Years of education: Not on file    Highest education level: Not on file   Occupational History     Employer: SELF-EMPLOYED   Social Needs    Financial resource strain: Not on file    Food insecurity     Worry: Not on file     Inability: Not on file   RADEUM needs     Medical: Not on file     Non-medical: Not on file   Tobacco Use    Smoking status: Former Smoker     Packs/day: 0.50     Years: 3.00     Pack years: 1.50     Types: Cigarettes     Last attempt to quit: 12/15/1990     Years since quittin.4    Smokeless tobacco: Never Used   Substance and Sexual Activity    Alcohol use: No    Drug use: No    Sexual activity: Not on file   Lifestyle    Physical activity     Days per week: Not on file     Minutes per session: Not on file    Stress: Not on file cranial nerve deficit. Sensory: No sensory deficit. Motor: No weakness or abnormal muscle tone. Coordination: Coordination normal.      Gait: Gait normal.      Deep Tendon Reflexes: Reflexes are normal and symmetric. Reflexes normal.      Comments: Tremor to hands noted   Psychiatric:         Mood and Affect: Mood normal.         Behavior: Behavior normal.         Thought Content: Thought content normal.         Judgment: Judgment normal.         1. Iron deficiency anemia, unspecified iron deficiency anemia type    2. Gastroesophageal reflux disease without esophagitis    3. Anxiety disorder, unspecified type    4. Tremor    5. Chronic low back pain, unspecified back pain laterality, unspecified whether sciatica present        ASSESSMENT/PLAN:     78-year-old male here for follow-up    1. Anemia: Much improved. Most likely due to the lack of an iron rich diet. 2.  Acid reflux: Well-controlled with Protonix    3. Anxiety: Stable on Valium    4. Tremor: Continue Inderal    5. Back pain/muscle spasms: Continue hydrocodone and Valium as needed    Juni Mayfield was seen today for follow-up. Diagnoses and all orders for this visit:    Iron deficiency anemia, unspecified iron deficiency anemia type  -     Comprehensive Metabolic Panel; Future  -     Lipid Panel; Future  -     TSH without Reflex; Future  -     CBC Auto Differential; Future    Gastroesophageal reflux disease without esophagitis    Anxiety disorder, unspecified type    Tremor    Chronic low back pain, unspecified back pain laterality, unspecified whether sciatica present          Return in about 4 months (around 9/8/2020).      Orders Placed This Encounter   Procedures    Comprehensive Metabolic Panel     Fasting 12 hours     Standing Status:   Future     Standing Expiration Date:   5/8/2021    Lipid Panel     Standing Status:   Future     Standing Expiration Date:   5/8/2021     Order Specific Question:   Is Patient Fasting?/# of Hours Answer:   12 hours    TSH without Reflex     Fast 12 hours     Standing Status:   Future     Standing Expiration Date:   5/8/2021    CBC Auto Differential     Standing Status:   Future     Standing Expiration Date:   5/8/2021       Kadi Rogers MD

## 2020-06-05 RX ORDER — HYDROCODONE BITARTRATE AND ACETAMINOPHEN 10; 325 MG/1; MG/1
TABLET ORAL
Qty: 90 TABLET | Refills: 0 | Status: SHIPPED | OUTPATIENT
Start: 2020-06-05 | End: 2020-07-02

## 2020-09-23 DIAGNOSIS — D50.9 IRON DEFICIENCY ANEMIA, UNSPECIFIED IRON DEFICIENCY ANEMIA TYPE: ICD-10-CM

## 2020-09-23 LAB
ALBUMIN SERPL-MCNC: 4 G/DL (ref 3.5–5.2)
ALP BLD-CCNC: 51 U/L (ref 40–130)
ALT SERPL-CCNC: 35 U/L (ref 5–41)
ANION GAP SERPL CALCULATED.3IONS-SCNC: 10 MMOL/L (ref 7–19)
AST SERPL-CCNC: 41 U/L (ref 5–40)
BASOPHILS ABSOLUTE: 0.1 K/UL (ref 0–0.2)
BASOPHILS RELATIVE PERCENT: 1.2 % (ref 0–1)
BILIRUB SERPL-MCNC: <0.2 MG/DL (ref 0.2–1.2)
BUN BLDV-MCNC: 22 MG/DL (ref 6–20)
CALCIUM SERPL-MCNC: 8.9 MG/DL (ref 8.6–10)
CHLORIDE BLD-SCNC: 100 MMOL/L (ref 98–111)
CHOLESTEROL, TOTAL: 137 MG/DL (ref 160–199)
CO2: 29 MMOL/L (ref 22–29)
CREAT SERPL-MCNC: 1.1 MG/DL (ref 0.5–1.2)
EOSINOPHILS ABSOLUTE: 0.2 K/UL (ref 0–0.6)
EOSINOPHILS RELATIVE PERCENT: 3.3 % (ref 0–5)
GFR AFRICAN AMERICAN: >59
GFR NON-AFRICAN AMERICAN: >60
GLUCOSE BLD-MCNC: 100 MG/DL (ref 74–109)
HCT VFR BLD CALC: 39.7 % (ref 42–52)
HDLC SERPL-MCNC: 60 MG/DL (ref 55–121)
HEMOGLOBIN: 12.6 G/DL (ref 14–18)
IMMATURE GRANULOCYTES #: 0 K/UL
LDL CHOLESTEROL CALCULATED: 53 MG/DL
LYMPHOCYTES ABSOLUTE: 1.4 K/UL (ref 1.1–4.5)
LYMPHOCYTES RELATIVE PERCENT: 27.1 % (ref 20–40)
MCH RBC QN AUTO: 30 PG (ref 27–31)
MCHC RBC AUTO-ENTMCNC: 31.7 G/DL (ref 33–37)
MCV RBC AUTO: 94.5 FL (ref 80–94)
MONOCYTES ABSOLUTE: 0.5 K/UL (ref 0–0.9)
MONOCYTES RELATIVE PERCENT: 8.8 % (ref 0–10)
NEUTROPHILS ABSOLUTE: 3 K/UL (ref 1.5–7.5)
NEUTROPHILS RELATIVE PERCENT: 59.2 % (ref 50–65)
PDW BLD-RTO: 14 % (ref 11.5–14.5)
PLATELET # BLD: 242 K/UL (ref 130–400)
PMV BLD AUTO: 9.9 FL (ref 9.4–12.4)
POTASSIUM SERPL-SCNC: 5.3 MMOL/L (ref 3.5–5)
RBC # BLD: 4.2 M/UL (ref 4.7–6.1)
SODIUM BLD-SCNC: 139 MMOL/L (ref 136–145)
TOTAL PROTEIN: 6.5 G/DL (ref 6.6–8.7)
TRIGL SERPL-MCNC: 122 MG/DL (ref 0–149)
TSH SERPL DL<=0.05 MIU/L-ACNC: 2.18 UIU/ML (ref 0.27–4.2)
WBC # BLD: 5.1 K/UL (ref 4.8–10.8)

## 2020-09-25 ENCOUNTER — OFFICE VISIT (OUTPATIENT)
Dept: INTERNAL MEDICINE | Age: 50
End: 2020-09-25

## 2020-09-25 VITALS
HEIGHT: 72 IN | SYSTOLIC BLOOD PRESSURE: 110 MMHG | DIASTOLIC BLOOD PRESSURE: 64 MMHG | OXYGEN SATURATION: 98 % | WEIGHT: 165 LBS | HEART RATE: 66 BPM | BODY MASS INDEX: 22.35 KG/M2

## 2020-09-25 LAB
AMPHETAMINE SCREEN, URINE: NORMAL
BARBITURATE SCREEN, URINE: NORMAL
BENZODIAZEPINE SCREEN, URINE: NORMAL
BUPRENORPHINE URINE: NORMAL
COCAINE METABOLITE SCREEN URINE: NORMAL
GABAPENTIN SCREEN, URINE: NORMAL
MDMA URINE: NORMAL
METHADONE SCREEN, URINE: NORMAL
METHAMPHETAMINE, URINE: NORMAL
OPIATE SCREEN URINE: NORMAL
OXYCODONE SCREEN URINE: NORMAL
PHENCYCLIDINE SCREEN URINE: NORMAL
PROPOXYPHENE SCREEN, URINE: NORMAL
THC SCREEN, URINE: NORMAL
TRICYCLIC ANTIDEPRESSANTS, UR: NORMAL

## 2020-09-25 PROCEDURE — 80305 DRUG TEST PRSMV DIR OPT OBS: CPT | Performed by: INTERNAL MEDICINE

## 2020-09-25 PROCEDURE — 99214 OFFICE O/P EST MOD 30 MIN: CPT | Performed by: INTERNAL MEDICINE

## 2020-09-25 RX ORDER — LORAZEPAM 0.5 MG/1
0.5 TABLET ORAL EVERY 8 HOURS PRN
Qty: 30 TABLET | Refills: 1 | Status: SHIPPED | OUTPATIENT
Start: 2020-09-25 | End: 2020-10-25

## 2020-09-25 RX ORDER — DIAZEPAM 10 MG/1
TABLET ORAL
Qty: 60 TABLET | Refills: 1 | Status: SHIPPED | OUTPATIENT
Start: 2020-09-25 | End: 2020-11-24

## 2020-09-25 RX ORDER — HYDROCODONE BITARTRATE AND ACETAMINOPHEN 10; 325 MG/1; MG/1
TABLET ORAL
Qty: 90 TABLET | Refills: 0 | Status: SHIPPED | OUTPATIENT
Start: 2020-09-25 | End: 2020-10-26

## 2020-09-25 ASSESSMENT — ENCOUNTER SYMPTOMS
BLOOD IN STOOL: 0
ABDOMINAL PAIN: 0
SHORTNESS OF BREATH: 0
PHOTOPHOBIA: 0
VOICE CHANGE: 0
NAUSEA: 0
TROUBLE SWALLOWING: 0
CHEST TIGHTNESS: 0
RHINORRHEA: 0
SORE THROAT: 0
WHEEZING: 0
BACK PAIN: 1
CONSTIPATION: 0
VOMITING: 0
EYE DISCHARGE: 0
EYE PAIN: 0
SINUS PRESSURE: 0
COUGH: 0
EYE REDNESS: 0
EYE ITCHING: 0
ABDOMINAL DISTENTION: 0
DIARRHEA: 0
COLOR CHANGE: 0

## 2020-09-25 NOTE — PROGRESS NOTES
Substance and Sexual Activity    Alcohol use: No    Drug use: No    Sexual activity: None   Lifestyle    Physical activity     Days per week: None     Minutes per session: None    Stress: None   Relationships    Social connections     Talks on phone: None     Gets together: None     Attends Protestant service: None     Active member of club or organization: None     Attends meetings of clubs or organizations: None     Relationship status: None    Intimate partner violence     Fear of current or ex partner: None     Emotionally abused: None     Physically abused: None     Forced sexual activity: None   Other Topics Concern    None   Social History Narrative    None      Family History   Problem Relation Age of Onset    No Known Problems Mother     No Known Problems Father         Current Outpatient Medications   Medication Sig Dispense Refill    diazePAM (VALIUM) 10 MG tablet TAKE 1 TABLET BY MOUTH 2 TIMES DAILY AS NEEDED FOR ANXIETY. 60 tablet 1    HYDROcodone-acetaminophen (NORCO)  MG per tablet TAKE 1 TABLET BY MOUTH EVERY 8 HOURS AS NEEDED FOR PAIN 90 tablet 0    LORazepam (ATIVAN) 0.5 MG tablet Take 1 tablet by mouth every 8 hours as needed for Anxiety (nightly prn) for up to 30 days. 30 tablet 1    pantoprazole (PROTONIX) 40 MG tablet TAKE 1 TABLET BY MOUTH EVERY MORNING (BEFORE BREAKFAST) 30 tablet 5    propranolol (INDERAL) 40 MG tablet TAKE 1 TABLET BY MOUTH 2 TIMES DAILY 180 tablet 3    promethazine (PHENERGAN) 25 MG tablet TAKE 1 TABLET BY MOUTH EVERY 6 HOURS AS NEEDED FOR NAUSEA 30 tablet 5    loratadine (CLARITIN) 10 MG tablet Take 10 mg by mouth daily.  Multiple Vitamin (MULTI-VITAMIN PO) Take  by mouth. No current facility-administered medications for this visit. There is no problem list on file for this patient.        Review of Systems   Constitutional: Negative for activity change, appetite change, chills, diaphoresis, fatigue, fever and unexpected weight change. HENT: Negative for congestion, ear pain, hearing loss, nosebleeds, postnasal drip, rhinorrhea, sinus pressure, sneezing, sore throat, tinnitus, trouble swallowing and voice change. Eyes: Negative for photophobia, pain, discharge, redness, itching and visual disturbance. Respiratory: Negative for cough, chest tightness, shortness of breath and wheezing. Cardiovascular: Negative for chest pain, palpitations and leg swelling. Gastrointestinal: Negative for abdominal distention, abdominal pain, blood in stool, constipation, diarrhea, nausea and vomiting. Reflux   Endocrine: Negative for cold intolerance, heat intolerance, polydipsia, polyphagia and polyuria. Genitourinary: Negative for difficulty urinating, dysuria, enuresis, frequency, hematuria and urgency. Musculoskeletal: Positive for back pain and neck pain. Chronic low back and neck pain   Skin: Negative for color change, pallor, rash and wound. Allergic/Immunologic: Positive for environmental allergies. Negative for food allergies and immunocompromised state. Neurological: Positive for tremors. Negative for dizziness, syncope, speech difficulty, weakness, light-headedness, numbness and headaches. Psychiatric/Behavioral: Negative for agitation, behavioral problems, confusion, decreased concentration, dysphoric mood, hallucinations, self-injury, sleep disturbance and suicidal ideas. The patient is nervous/anxious. The patient is not hyperactive. Worsening anxiety       /64   Pulse 66   Ht 6' (1.829 m)   Wt 165 lb (74.8 kg)   SpO2 98%   BMI 22.38 kg/m²   Physical Exam  Vitals signs and nursing note reviewed. Constitutional:       General: He is not in acute distress. Appearance: Normal appearance. He is well-developed and normal weight. He is not ill-appearing, toxic-appearing or diaphoretic. HENT:      Head: Normocephalic and atraumatic.       Right Ear: Tympanic membrane, ear canal and external ear normal. There is no impacted cerumen. Left Ear: Tympanic membrane, ear canal and external ear normal. There is no impacted cerumen. Nose: Nose normal. No congestion or rhinorrhea. Mouth/Throat:      Mouth: Mucous membranes are moist.      Pharynx: Oropharynx is clear. No oropharyngeal exudate or posterior oropharyngeal erythema. Eyes:      General: No scleral icterus. Right eye: No discharge. Left eye: No discharge. Extraocular Movements: Extraocular movements intact. Conjunctiva/sclera: Conjunctivae normal.      Pupils: Pupils are equal, round, and reactive to light. Neck:      Musculoskeletal: Normal range of motion and neck supple. No neck rigidity or muscular tenderness. Thyroid: No thyromegaly. Vascular: No carotid bruit or JVD. Trachea: No tracheal deviation. Cardiovascular:      Rate and Rhythm: Normal rate and regular rhythm. Pulses: Normal pulses. Heart sounds: Normal heart sounds. No murmur. No friction rub. No gallop. Pulmonary:      Effort: Pulmonary effort is normal. No respiratory distress. Breath sounds: Normal breath sounds. No stridor. No wheezing, rhonchi or rales. Chest:      Chest wall: No tenderness. Abdominal:      General: Bowel sounds are normal. There is no distension. Palpations: Abdomen is soft. There is no mass. Tenderness: There is no abdominal tenderness. There is no right CVA tenderness, left CVA tenderness, guarding or rebound. Hernia: No hernia is present. Musculoskeletal: Normal range of motion. General: Tenderness present. No swelling, deformity or signs of injury. Right lower leg: No edema. Left lower leg: No edema. Comments: Gait antalgic. There is tenderness over the lower lumbar spine   Lymphadenopathy:      Cervical: No cervical adenopathy. Skin:     General: Skin is warm and dry.       Capillary Refill: Capillary refill takes less than 2 seconds. Coloration: Skin is not jaundiced or pale. Findings: No bruising, erythema, lesion or rash. Neurological:      General: No focal deficit present. Mental Status: He is alert and oriented to person, place, and time. Mental status is at baseline. Cranial Nerves: No cranial nerve deficit. Sensory: No sensory deficit. Motor: No weakness or abnormal muscle tone. Coordination: Coordination normal.      Gait: Gait normal.      Deep Tendon Reflexes: Reflexes are normal and symmetric. Reflexes normal.      Comments: Tremor to hands noted   Psychiatric:         Mood and Affect: Mood normal.         Behavior: Behavior normal.         Thought Content: Thought content normal.         Judgment: Judgment normal.         1. Chronic neck pain    2. Anxiety    3. Long-term use of high-risk medication    4. Muscle tremor    5. Gastroesophageal reflux disease without esophagitis    6. Environmental allergies        ASSESSMENT/PLAN:    75-year-old male here for follow-up    1. Chronic neck pain: Continue hydrocodone as needed    2. Anxiety: Add Ativan at bedtime. Continue Valium as prescribed    3. Tremors: Stable on a regimen of propanolol and Valium    4. Acid reflux: Well-controlled    5. Allergies: Stable    Clarence Sanders was seen today for follow-up. Diagnoses and all orders for this visit:    Chronic neck pain  -     HYDROcodone-acetaminophen (NORCO)  MG per tablet; TAKE 1 TABLET BY MOUTH EVERY 8 HOURS AS NEEDED FOR PAIN  -     CBC Auto Differential; Future  -     Comprehensive Metabolic Panel; Future  -     Lipid Panel; Future  -     TSH without Reflex; Future    Anxiety  -     diazePAM (VALIUM) 10 MG tablet; TAKE 1 TABLET BY MOUTH 2 TIMES DAILY AS NEEDED FOR ANXIETY. -     CBC Auto Differential; Future  -     Comprehensive Metabolic Panel; Future  -     Lipid Panel; Future  -     TSH without Reflex; Future  -     PSA Screening;  Future  -     LORazepam (ATIVAN) 0.5 MG tablet; Take 1 tablet by mouth every 8 hours as needed for Anxiety (nightly prn) for up to 30 days.     Long-term use of high-risk medication  -     POCT Rapid Drug Screen    Muscle tremor    Gastroesophageal reflux disease without esophagitis    Environmental allergies          Return in about 4 months (around 1/25/2021) for physical.     Orders Placed This Encounter   Procedures    CBC Auto Differential     Fast 12 hours     Standing Status:   Future     Standing Expiration Date:   9/25/2021    Comprehensive Metabolic Panel     Fasting 12 hours     Standing Status:   Future     Standing Expiration Date:   9/25/2021    Lipid Panel     Standing Status:   Future     Standing Expiration Date:   9/25/2021     Order Specific Question:   Is Patient Fasting?/# of Hours     Answer:   12    TSH without Reflex     Fast 12 hours     Standing Status:   Future     Standing Expiration Date:   9/25/2021    PSA Screening     Standing Status:   Future     Standing Expiration Date:   9/25/2021   Alfonzo Hidalgo MD

## 2020-09-25 NOTE — PATIENT INSTRUCTIONS
Patient Education        lorazepam (oral)  Pronunciation:  mirna A ze chao  Brand:  Ativan  What is the most important information I should know about lorazepam?  Fatal side effects can occur if you take lorazepam with alcohol, opioid medicine, or other drugs that cause drowsiness or slow your breathing. Taking this medicine during pregnancy may cause life-threatening withdrawal symptoms in the . Lorazepam may be habit-forming. Misuse can cause addiction, overdose, or death. What is lorazepam?  Lorazepam is a benzodiazepine (sahil-dolly-dye-AZE-eh-peen) that is used to treat anxiety disorders. It is dangerous to purchase lorazepam on the Internet or outside the [place]. The sale and distribution of medicines outside the [place] does not comply with safe-use regulations of the Food and Drug Administration (FDA). These medications may contain dangerous ingredients, or may not be distributed by a licensed pharmacy. Lorazepam may also be used for purposes not listed in this medication guide. What should I discuss with my healthcare provider before taking lorazepam?  You should not take lorazepam if you have:  · narrow-angle glaucoma; or  · a history of allergic reaction to any benzodiazepine (diazepam, alprazolam, lorazepam, Ativan, Klonopin, Restoril, Tranxene, Valium, Versed, Xanax, and others). Tell your doctor if you have ever had:  · breathing problems such as COPD (chronic obstructive pulmonary disease) or sleep apnea (breathing that stops during sleep);  · drug or alcohol addiction;  · depression, mood problems, or suicidal thoughts or behavior;  · kidney or liver disease;  · seizures; or  · glaucoma. If you use lorazepam while you are pregnant, your baby could become dependent on the drug. This can cause life-threatening withdrawal symptoms in the baby after it is born. Babies born dependent on habit-forming medicine may need medical treatment for several weeks.   You should not breastfeed while using lorazepam.  Lorazepam is not approved for use by anyone younger than 15years old. How should I take lorazepam?  Follow the directions on your prescription label and read all medication guides. Never use lorazepam in larger amounts, or for longer than prescribed. Tell your doctor if you feel an increased urge to use more of this medicine. Lorazepam may be habit-forming. Misuse can cause addiction, overdose, or death. Keep the medication in a place where others cannot get to it. Selling or giving away this medicine is against the law. Measure liquid medicine carefully. Use the dosing syringe provided, or use a medicine dose-measuring device (not a kitchen spoon). Do not take lorazepam for longer than 4 months unless your doctor tells you to. Call your doctor if your symptoms do not improve, or if they get worse. If you use this medicine long-term, you may need frequent medical tests. Do not stop using lorazepam suddenly, or you could have unpleasant withdrawal symptoms. Ask your doctor how to safely stop using this medicine. Store at room temperature away from moisture and heat. Store the liquid  form of lorazepam in the refrigerator. Throw away any liquid not used within 90 days. Keep track of your medicine. You should be aware if anyone is using it improperly or without a prescription. What happens if I miss a dose? Take the medicine as soon as you can, but skip the missed dose if it is almost time for your next dose. Do not take two doses at one time. What happens if I overdose? Seek emergency medical attention or call the Poison Help line at 1-612.181.6186. An overdose of lorazepam can be fatal.  Overdose symptoms may include extreme drowsiness, confusion, slurred speech, feeling restless, muscle weakness, loss of balance or coordination, feeling light-headed, slow heartbeats, weak or shallow breathing, or coma. What should I avoid while taking lorazepam?  Avoid drinking alcohol.  Dangerous side effects or death could occur. Avoid driving or hazardous activity until you know how this medicine will affect you. Dizziness or drowsiness can cause falls, accidents, or severe injuries. What are the possible side effects of lorazepam?  Get emergency medical help if you have signs of an allergic reaction: hives; difficulty breathing; swelling of your face, lips, tongue, or throat. Call your doctor at once if you have:  · severe drowsiness;  · unusual changes in mood or behavior;  · sudden restless feeling or excitement;  · thoughts of suicide or hurting yourself;  · confusion, aggression, hallucinations;  · worsened sleep problems;  · vision changes; or  · dark urine, or jaundice (yellowing of the skin or eyes). The sedative effects of lorazepam may last longer in older adults. Accidental falls are common in elderly patients who take benzodiazepines. Use caution to avoid falling or accidental injury. Common side effects may include:  · dizziness, drowsiness;  · weakness; or  · feeling unsteady. This is not a complete list of side effects and others may occur. Call your doctor for medical advice about side effects. You may report side effects to FDA at 9-381-FDA-5482. What other drugs will affect lorazepam?  Taking lorazepam with other drugs that make you sleepy or slow your breathing can cause dangerous side effects or death. Ask your doctor before using opioid medication, a sleeping pill, a muscle relaxer, prescription cough medicine, or medicine for depression or seizures. Tell your doctor about all your other medicines, especially:  · probenecid, aminophylline, or theophylline;  · any other medicines to treat anxiety;  · medicine to treat mental illness;  · seizure medicine; or  · medicine that contains an antihistamine (such as sleep medicine, cold or allergy medicine). This list is not complete.  Other drugs may affect lorazepam, including prescription and over-the-counter medicines, vitamins, and herbal products. Not all possible drug interactions are listed here. Where can I get more information? Your pharmacist can provide more information about lorazepam.  Remember, keep this and all other medicines out of the reach of children, never share your medicines with others, and use this medication only for the indication prescribed. Every effort has been made to ensure that the information provided by [facility] is accurate, up-to-date, and complete, but no guarantee is made to that effect. Drug information contained herein may be time sensitive. [facility] information has been compiled for use by healthcare practitioners and consumers in the [place] and therefore [facility] does not warrant that uses outside of the [place] are appropriate, unless specifically indicated otherwise. [facility]'s drug information does not endorse drugs, diagnose patients or recommend therapy. [facility]'s drug information is an informational resource designed to assist licensed healthcare practitioners in caring for their patients and/or to serve consumers viewing this service as a supplement to, and not a substitute for, the expertise, skill, knowledge and judgment of healthcare practitioners. The absence of a warning for a given drug or drug combination in no way should be construed to indicate that the drug or drug combination is safe, effective or appropriate for any given patient. [facility] does not assume any responsibility for any aspect of healthcare administered with the aid of information [facility] provides. The information contained herein is not intended to cover all possible uses, directions, precautions, warnings, drug interactions, allergic reactions, or adverse effects. If you have questions about the drugs you are taking, check with your doctor, nurse or pharmacist.  Copyright 5638-0419 [address]. Version: 9.01. Revision date: 12/31/2019.   Care instructions adapted under license by [facility]. If you have questions about a medical condition or this instruction, always ask your healthcare professional. [place] [address] any warranty or liability for your use of this information.

## 2020-11-12 RX ORDER — PROMETHAZINE HYDROCHLORIDE 25 MG/1
25 TABLET ORAL EVERY 6 HOURS PRN
Qty: 30 TABLET | Refills: 5 | Status: SHIPPED | OUTPATIENT
Start: 2020-11-12 | End: 2021-04-08

## 2020-11-12 NOTE — TELEPHONE ENCOUNTER
Michelle Carbajal called requesting a refill of the below medication which has been pended for you:     Requested Prescriptions     Pending Prescriptions Disp Refills    promethazine (PHENERGAN) 25 MG tablet [Pharmacy Med Name: PROMETHAZINE 25 MG TABLET 25 Tablet] 30 tablet 5     Sig: TAKE 1 TABLET BY MOUTH EVERY 6 HOURS AS NEEDED FOR NAUSEA       Last Appointment Date: 9/25/2020  Next Appointment Date: 1/29/2021    No Known Allergies

## 2020-11-25 RX ORDER — PANTOPRAZOLE SODIUM 40 MG/1
40 TABLET, DELAYED RELEASE ORAL
Qty: 30 TABLET | Refills: 5 | Status: SHIPPED | OUTPATIENT
Start: 2020-11-25 | End: 2021-01-29

## 2020-11-25 NOTE — TELEPHONE ENCOUNTER
Fartun Sanchez called requesting a refill of the below medication which has been pended for you:     Requested Prescriptions     Pending Prescriptions Disp Refills    pantoprazole (PROTONIX) 40 MG tablet [Pharmacy Med Name: PANTOPRAZOLE 40 MG T 40 Tablet] 30 tablet 5     Sig: TAKE 1 TABLET BY MOUTH EVERY MORNING (BEFORE BREAKFAST)       Last Appointment Date: 9/25/2020  Next Appointment Date: 1/29/2021    No Known Allergies

## 2021-01-12 DIAGNOSIS — F41.9 ANXIETY DISORDER, UNSPECIFIED TYPE: ICD-10-CM

## 2021-01-12 DIAGNOSIS — M54.2 CHRONIC NECK PAIN: ICD-10-CM

## 2021-01-12 DIAGNOSIS — G89.29 CHRONIC NECK PAIN: ICD-10-CM

## 2021-01-12 RX ORDER — LORAZEPAM 0.5 MG/1
TABLET ORAL
Qty: 90 TABLET | Refills: 1 | Status: SHIPPED | OUTPATIENT
Start: 2021-01-12 | End: 2021-03-17

## 2021-01-12 RX ORDER — HYDROCODONE BITARTRATE AND ACETAMINOPHEN 10; 325 MG/1; MG/1
TABLET ORAL
Qty: 90 TABLET | Refills: 0 | Status: SHIPPED | OUTPATIENT
Start: 2021-01-12 | End: 2021-02-17

## 2021-01-12 NOTE — TELEPHONE ENCOUNTER
Chick Paget called to request a refill on his medication. Last office visit : 9/25/2020   Next office visit : 1/29/2021     Last UDS:   Amphetamine Screen, Urine   Date Value Ref Range Status   09/25/2020 neg  Final     Barbiturate Screen, Urine   Date Value Ref Range Status   09/25/2020 neg  Final     Benzodiazepine Screen, Urine   Date Value Ref Range Status   09/25/2020 pos  Final     Buprenorphine Urine   Date Value Ref Range Status   09/25/2020 neg  Final     Cocaine Metabolite Screen, Urine   Date Value Ref Range Status   09/25/2020 neg  Final     Gabapentin Screen, Urine   Date Value Ref Range Status   09/25/2020 neg  Final     MDMA, Urine   Date Value Ref Range Status   09/25/2020 neg  Final     Methamphetamine, Urine   Date Value Ref Range Status   09/25/2020 neg  Final     Opiate Scrn, Ur   Date Value Ref Range Status   09/25/2020 pos  Final     Oxycodone Screen, Ur   Date Value Ref Range Status   09/25/2020 neg  Final     PCP Screen, Urine   Date Value Ref Range Status   09/25/2020 neg  Final     Propoxyphene Screen, Urine   Date Value Ref Range Status   09/25/2020 neg  Final     THC Screen, Urine   Date Value Ref Range Status   09/25/2020 neg  Final     Tricyclic Antidepressants, Urine   Date Value Ref Range Status   09/25/2020 neg  Final       Fransisca Allyssa: 11/23/2020  UDS 7/25/20  Med contract  1/3/20      Requested Prescriptions     Pending Prescriptions Disp Refills    LORazepam (ATIVAN) 0.5 MG tablet [Pharmacy Med Name: LORazepam 0.5 MG TABS 0.5 Tablet] 90 tablet 1     Sig: TAKE 1 TABLET BY MOUTH EVERY 8 HOURS AS NEEDED FOR ANXIETY (NIGHTLY AS NEEDED)    HYDROcodone-acetaminophen (NORCO)  MG per tablet [Pharmacy Med Name: HYDROC/APAP   Tablet] 90 tablet 0     Sig: TAKE 1 TABLET BY MOUTH EVERY 8 HOURS AS NEEDED FOR PAIN         Please approve or refuse this medication.    Woo Amaya

## 2021-01-28 DIAGNOSIS — F41.9 ANXIETY: ICD-10-CM

## 2021-01-28 DIAGNOSIS — M54.2 CHRONIC NECK PAIN: ICD-10-CM

## 2021-01-28 DIAGNOSIS — G89.29 CHRONIC NECK PAIN: ICD-10-CM

## 2021-01-28 LAB
ALBUMIN SERPL-MCNC: 4.2 G/DL (ref 3.5–5.2)
ALP BLD-CCNC: 54 U/L (ref 40–130)
ALT SERPL-CCNC: 26 U/L (ref 5–41)
ANION GAP SERPL CALCULATED.3IONS-SCNC: 12 MMOL/L (ref 7–19)
AST SERPL-CCNC: 37 U/L (ref 5–40)
BASOPHILS ABSOLUTE: 0.1 K/UL (ref 0–0.2)
BASOPHILS RELATIVE PERCENT: 1 % (ref 0–1)
BILIRUB SERPL-MCNC: <0.2 MG/DL (ref 0.2–1.2)
BUN BLDV-MCNC: 18 MG/DL (ref 6–20)
CALCIUM SERPL-MCNC: 9.2 MG/DL (ref 8.6–10)
CHLORIDE BLD-SCNC: 103 MMOL/L (ref 98–111)
CHOLESTEROL, TOTAL: 136 MG/DL (ref 160–199)
CO2: 27 MMOL/L (ref 22–29)
CREAT SERPL-MCNC: 1.2 MG/DL (ref 0.5–1.2)
EOSINOPHILS ABSOLUTE: 0.2 K/UL (ref 0–0.6)
EOSINOPHILS RELATIVE PERCENT: 2.5 % (ref 0–5)
GFR AFRICAN AMERICAN: >59
GFR NON-AFRICAN AMERICAN: >60
GLUCOSE BLD-MCNC: 117 MG/DL (ref 74–109)
HCT VFR BLD CALC: 39.8 % (ref 42–52)
HDLC SERPL-MCNC: 50 MG/DL (ref 55–121)
HEMOGLOBIN: 12.7 G/DL (ref 14–18)
IMMATURE GRANULOCYTES #: 0 K/UL
LDL CHOLESTEROL CALCULATED: 65 MG/DL
LYMPHOCYTES ABSOLUTE: 1.5 K/UL (ref 1.1–4.5)
LYMPHOCYTES RELATIVE PERCENT: 21.2 % (ref 20–40)
MCH RBC QN AUTO: 30 PG (ref 27–31)
MCHC RBC AUTO-ENTMCNC: 31.9 G/DL (ref 33–37)
MCV RBC AUTO: 93.9 FL (ref 80–94)
MONOCYTES ABSOLUTE: 0.6 K/UL (ref 0–0.9)
MONOCYTES RELATIVE PERCENT: 8.6 % (ref 0–10)
NEUTROPHILS ABSOLUTE: 4.6 K/UL (ref 1.5–7.5)
NEUTROPHILS RELATIVE PERCENT: 66.3 % (ref 50–65)
PDW BLD-RTO: 13.1 % (ref 11.5–14.5)
PLATELET # BLD: 242 K/UL (ref 130–400)
PMV BLD AUTO: 10.4 FL (ref 9.4–12.4)
POTASSIUM SERPL-SCNC: 5 MMOL/L (ref 3.5–5)
PROSTATE SPECIFIC ANTIGEN: 1.95 NG/ML (ref 0–4)
RBC # BLD: 4.24 M/UL (ref 4.7–6.1)
SODIUM BLD-SCNC: 142 MMOL/L (ref 136–145)
TOTAL PROTEIN: 6.8 G/DL (ref 6.6–8.7)
TRIGL SERPL-MCNC: 104 MG/DL (ref 0–149)
TSH SERPL DL<=0.05 MIU/L-ACNC: 0.92 UIU/ML (ref 0.27–4.2)
WBC # BLD: 6.9 K/UL (ref 4.8–10.8)

## 2021-01-29 ENCOUNTER — OFFICE VISIT (OUTPATIENT)
Dept: INTERNAL MEDICINE | Age: 51
End: 2021-01-29

## 2021-01-29 VITALS
HEIGHT: 72 IN | HEART RATE: 60 BPM | SYSTOLIC BLOOD PRESSURE: 110 MMHG | WEIGHT: 166.8 LBS | OXYGEN SATURATION: 98 % | DIASTOLIC BLOOD PRESSURE: 68 MMHG | BODY MASS INDEX: 22.59 KG/M2

## 2021-01-29 DIAGNOSIS — Z00.00 ROUTINE HEALTH MAINTENANCE: Primary | ICD-10-CM

## 2021-01-29 DIAGNOSIS — F41.9 ANXIETY DISORDER, UNSPECIFIED TYPE: ICD-10-CM

## 2021-01-29 DIAGNOSIS — Z91.09 ENVIRONMENTAL ALLERGIES: ICD-10-CM

## 2021-01-29 DIAGNOSIS — M54.50 CHRONIC LOW BACK PAIN, UNSPECIFIED BACK PAIN LATERALITY, UNSPECIFIED WHETHER SCIATICA PRESENT: ICD-10-CM

## 2021-01-29 DIAGNOSIS — G89.29 CHRONIC LOW BACK PAIN, UNSPECIFIED BACK PAIN LATERALITY, UNSPECIFIED WHETHER SCIATICA PRESENT: ICD-10-CM

## 2021-01-29 DIAGNOSIS — G25.0 ESSENTIAL TREMOR: ICD-10-CM

## 2021-01-29 DIAGNOSIS — Z79.899 LONG-TERM USE OF HIGH-RISK MEDICATION: ICD-10-CM

## 2021-01-29 DIAGNOSIS — K21.9 GASTROESOPHAGEAL REFLUX DISEASE WITHOUT ESOPHAGITIS: ICD-10-CM

## 2021-01-29 LAB
ALCOHOL URINE: NORMAL
AMPHETAMINE SCREEN, URINE: NORMAL
BARBITURATE SCREEN, URINE: NORMAL
BENZODIAZEPINE SCREEN, URINE: POSITIVE
BUPRENORPHINE URINE: NORMAL
COCAINE METABOLITE SCREEN URINE: NORMAL
FENTANYL SCREEN, URINE: NORMAL
GABAPENTIN SCREEN, URINE: NORMAL
MDMA URINE: NORMAL
METHADONE SCREEN, URINE: NORMAL
METHAMPHETAMINE, URINE: NORMAL
OPIATE SCREEN URINE: POSITIVE
OXYCODONE SCREEN URINE: NORMAL
PHENCYCLIDINE SCREEN URINE: NORMAL
PROPOXYPHENE SCREEN, URINE: NORMAL
SYNTHETIC CANNABINOIDS(K2) SCREEN, URINE: NORMAL
THC SCREEN, URINE: NORMAL
TRAMADOL SCREEN URINE: NORMAL
TRICYCLIC ANTIDEPRESSANTS, UR: NORMAL

## 2021-01-29 PROCEDURE — 99396 PREV VISIT EST AGE 40-64: CPT | Performed by: INTERNAL MEDICINE

## 2021-01-29 PROCEDURE — 80305 DRUG TEST PRSMV DIR OPT OBS: CPT | Performed by: INTERNAL MEDICINE

## 2021-01-29 RX ORDER — OXYCODONE HYDROCHLORIDE AND ACETAMINOPHEN 5; 325 MG/1; MG/1
1 TABLET ORAL EVERY 6 HOURS PRN
Qty: 12 TABLET | Refills: 0 | Status: SHIPPED | OUTPATIENT
Start: 2021-01-29 | End: 2021-03-17

## 2021-01-29 RX ORDER — OMEPRAZOLE 20 MG/1
20 CAPSULE, DELAYED RELEASE ORAL DAILY
COMMUNITY

## 2021-01-29 SDOH — ECONOMIC STABILITY: FOOD INSECURITY: WITHIN THE PAST 12 MONTHS, YOU WORRIED THAT YOUR FOOD WOULD RUN OUT BEFORE YOU GOT MONEY TO BUY MORE.: NEVER TRUE

## 2021-01-29 SDOH — ECONOMIC STABILITY: TRANSPORTATION INSECURITY
IN THE PAST 12 MONTHS, HAS LACK OF TRANSPORTATION KEPT YOU FROM MEETINGS, WORK, OR FROM GETTING THINGS NEEDED FOR DAILY LIVING?: NO

## 2021-01-29 ASSESSMENT — ENCOUNTER SYMPTOMS
COLOR CHANGE: 0
PHOTOPHOBIA: 0
BLOOD IN STOOL: 0
ABDOMINAL PAIN: 0
VOICE CHANGE: 0
CONSTIPATION: 0
SORE THROAT: 0
SHORTNESS OF BREATH: 0
RHINORRHEA: 0
WHEEZING: 0
VOMITING: 0
NAUSEA: 0
EYE ITCHING: 0
BACK PAIN: 1
EYE REDNESS: 0
EYE DISCHARGE: 0
TROUBLE SWALLOWING: 0
ABDOMINAL DISTENTION: 0
EYE PAIN: 0
DIARRHEA: 0
SINUS PRESSURE: 0
COUGH: 0
CHEST TIGHTNESS: 0

## 2021-01-29 ASSESSMENT — PATIENT HEALTH QUESTIONNAIRE - PHQ9
2. FEELING DOWN, DEPRESSED OR HOPELESS: 0
SUM OF ALL RESPONSES TO PHQ QUESTIONS 1-9: 0

## 2021-01-29 NOTE — PROGRESS NOTES
Chief Complaint   Patient presents with    Annual Exam       HPI: Mir Negro is a 44 y.o. male is here for annual physical exam.  His functional status is good. He denies any history of falls. He has no concerns in regards to safety, hearing, or cognition. He does not see any other healthcare providers on a routine basis. His acid reflux is well controlled. However, his back pain has been getting worse. Does take hydrocodone 3 times a day. He also takes Valium as needed. Over the past few days, has been having increasing back pain and back spasms. He would like to have a few Percocet for breakthrough pain. He states that he will only use this if he absolutely needs to and on of as needed basis. I did tell him that we could give him a few Percocet. However, he needs to be very cautious of his use of opioids. He agrees to this. He sleeps well with lorazepam at night. Does take Valium during the day for muscle spasms and for essential tremor. His tremor has remained relatively stable. His allergies are well controlled.     Routine screening is as follows:  Eye exam yearly  Flu vaccine declined  PSA yearly  Colonoscopy 2018      Past Medical History:   Diagnosis Date    Allergic rhinitis     Cellulitis     Gastroparesis     GERD (gastroesophageal reflux disease)       Past Surgical History:   Procedure Laterality Date    CHOLECYSTECTOMY      MT COLONOSCOPY FLX DX W/COLLJ SPEC WHEN PFRMD N/A 8/2/2018    Dr Toshia Choudhary, 10 yr recall      Social History     Socioeconomic History    Marital status:      Spouse name: None    Number of children: None    Years of education: None    Highest education level: None   Occupational History     Employer: SELF-EMPLOYED   Social Needs    Financial resource strain: Not hard at all   [place] insecurity     Worry: Never true     Inability: Never true    Transportation needs     Medical: No     Non-medical: No   Tobacco Use    Smoking status: Former Smoker     Packs/day: 0.50     Years: 3.00     Pack years: 1.50     Types: Cigarettes     Quit date: 12/15/1990     Years since quittin.1    Smokeless tobacco: Never Used   Substance and Sexual Activity    Alcohol use: No    Drug use: No    Sexual activity: None   Lifestyle    Physical activity     Days per week: None     Minutes per session: None    Stress: None   Relationships    Social connections     Talks on phone: None     Gets together: None     Attends Hoahaoism service: None     Active member of club or organization: None     Attends meetings of clubs or organizations: None     Relationship status: None    Intimate partner violence     Fear of current or ex partner: None     Emotionally abused: None     Physically abused: None     Forced sexual activity: None   Other Topics Concern    None   Social History Narrative    None      Family History   Problem Relation Age of Onset    No Known Problems Mother     No Known Problems Father         Current Outpatient Medications   Medication Sig Dispense Refill    omeprazole (PRILOSEC) 20 MG delayed release capsule Take 20 mg by mouth daily      oxyCODONE-acetaminophen (PERCOCET) 5-325 MG per tablet Take 1 tablet by mouth every 6 hours as needed for Pain for up to 3 days. Intended supply: 3 days.  Take lowest dose possible to manage pain 12 tablet 0    diazePAM (VALIUM) 10 MG tablet TAKE 1 TABLET BY MOUTH TWO TIMES A DAY AS NEEDED FOR ANXIETY 20 60 tablet 1    LORazepam (ATIVAN) 0.5 MG tablet TAKE 1 TABLET BY MOUTH EVERY 8 HOURS AS NEEDED FOR ANXIETY (NIGHTLY AS NEEDED) 90 tablet 1    HYDROcodone-acetaminophen (NORCO)  MG per tablet TAKE 1 TABLET BY MOUTH EVERY 8 HOURS AS NEEDED FOR PAIN 90 tablet 0    promethazine (PHENERGAN) 25 MG tablet TAKE 1 TABLET BY MOUTH EVERY 6 HOURS AS NEEDED FOR NAUSEA 30 tablet 5    propranolol (INDERAL) 40 MG tablet TAKE 1 TABLET BY MOUTH 2 TIMES DAILY 180 tablet 3    loratadine (CLARITIN) 10 MG tablet Take 10 mg by mouth daily.  Multiple Vitamin (MULTI-VITAMIN PO) Take  by mouth. No current facility-administered medications for this visit. There is no problem list on file for this patient. Review of Systems   Constitutional: Negative for activity change, appetite change, chills, diaphoresis, fatigue, fever and unexpected weight change. HENT: Negative for congestion, ear pain, hearing loss, nosebleeds, postnasal drip, rhinorrhea, sinus pressure, sneezing, sore throat, tinnitus, trouble swallowing and voice change. Eyes: Negative for photophobia, pain, discharge, redness, itching and visual disturbance. Respiratory: Negative for cough, chest tightness, shortness of breath and wheezing. Cardiovascular: Negative for chest pain, palpitations and leg swelling. Gastrointestinal: Negative for abdominal distention, abdominal pain, blood in stool, constipation, diarrhea, nausea and vomiting. Reflux   Endocrine: Negative for cold intolerance, heat intolerance, polydipsia, polyphagia and polyuria. Genitourinary: Negative for difficulty urinating, dysuria, enuresis, frequency, hematuria and urgency. Musculoskeletal: Positive for back pain and neck pain. Chronic low back and neck pain   Skin: Negative for color change, pallor, rash and wound. Allergic/Immunologic: Positive for environmental allergies. Negative for food allergies and immunocompromised state. Neurological: Positive for tremors. Negative for dizziness, syncope, speech difficulty, weakness, light-headedness, numbness and headaches. Psychiatric/Behavioral: Negative for agitation, behavioral problems, confusion, decreased concentration, dysphoric mood, hallucinations, self-injury, sleep disturbance and suicidal ideas. The patient is nervous/anxious. The patient is not hyperactive.          Anxiety is stable       /68   Pulse 60   Ht 6' (1.829 m)   Wt 166 lb 12.8 oz (75.7 kg)   SpO2 98% Diagnoses and all orders for this visit:    Routine health maintenance    Long-term use of high-risk medication  -     POCT Rapid Drug Screen  -     oxyCODONE-acetaminophen (PERCOCET) 5-325 MG per tablet; Take 1 tablet by mouth every 6 hours as needed for Pain for up to 3 days. Intended supply: 3 days. Take lowest dose possible to manage pain    Gastroesophageal reflux disease without esophagitis    Chronic low back pain, unspecified back pain laterality, unspecified whether sciatica present    Anxiety disorder, unspecified type    Essential tremor    Environmental allergies          Return in about 3 months (around 4/29/2021).      Orders Placed This Encounter   Procedures   Abhijit Hampton MD

## 2021-01-29 NOTE — PATIENT INSTRUCTIONS
Patient Education        Learning About Safe Use of Long-Acting Opioids  Introduction    Long-acting opioids relieve moderate to severe pain. They are also called extended-release opioids. Opioids relieve pain by changing the way your body feels pain. They don't cure a health problem. They help you manage the pain. If you take a lot of short-acting pain medicine, your doctor may give you long-acting opioids. They help you avoid the ups and downs in pain relief that you may have with short-acting medicine. Opioids are strong medicines. They can help you manage pain when you use them the right way. But if you misuse them, they can cause serious harm and even death. If you decide to take opioids, here are some things to remember. · Keep your doctor informed. You can develop opioid use disorder. Moderate to severe opioid use disorder is sometimes called addiction. The risk is higher if you have a history of substance use. Your doctor will monitor you closely for signs of opioid use disorder and to figure out when you no longer need to take opioids. · Make a treatment plan. The goal of your plan is to be able to function and do the things you need to do, even if you still have some pain. You might be able to manage your pain with other non-opioid options like physical therapy, relaxation, or over-the-counter pain medicines. · Be aware of the side effects. Opioids can cause serious side effects, such as constipation, dry mouth, and nausea. And over time, you may need a higher dose to get pain relief. This is called tolerance. Your body also gets used to opioids. This is called physical dependence. If you suddenly stop taking them, you may have withdrawal symptoms. Examples  · Fentanyl patch (Duragesic)  · Methadone (Dolophine)  · Morphine (Keke)  · Oxycodone controlled-release (OxyContin)  Safety tips  If you need to take opioids to manage your pain, remember these safety tips. · Follow directions carefully. It's easy to misuse opioids if you take a dose other than what's prescribed by your doctor. This can lead to overdose and even death. Even sharing them with someone they weren't meant for is misuse. · Be cautious. Opioids may affect your judgment and decision making. Do not drive or operate machinery until you can think clearly. Talk with your doctor about when it is safe to drive. · Reduce the risk of drug interactions. Opioids can be dangerous if you take them with alcohol or with certain drugs like sleeping pills and muscle relaxers. Make sure your doctor knows about all the other medicines you take, including over-the-counter medicines. Don't start any new medicines before you talk to your doctor or pharmacist.  · Safely store and dispose of opioids. Store opioids in a safe and secure place. Make sure that pets, children, friends, and family can't get to them. When you're done using opioids, make sure to dispose of them safely and as quickly as possible. The [place] Food and Drug Administration (FDA) recommends these disposal options. ? The best option is to take your medicine to a drop-off box or take-back program that is authorized by the [employer] [address] (JUDY). ? If these programs aren't available in your area and your medicine doesn't have specific disposal instructions (such as flushing), you can throw them into your household trash if you follow the FDA's instructions. Visit fda.gov and search for \"unused medicine disposal.\"  ? If you have opioid patches (used or unused), your options are to take them to a JUDY-authorized site or flush them down the toilet. Do not throw them in the trash. ? Only flush your medicine down the toilet if you can't get to a JUDY-approved site or your medicine instructions state clearly to flush them. · Reduce the risk of overdose. Misuse of opioids can be very dangerous. Protect yourself by asking your doctor about a naloxone rescue kit. It can help youand even save your lifeif you take too much of an opioid. Possible side effects  All medicines have side effects. But many people don't feel the side effects, or they are able to deal with them. You may:  · Feel confused or have a hard time thinking clearly. · Be constipated. · Feel faint, dizzy, or lightheaded. · Feel drowsy. · Feel sick to your stomach or vomit. · Have an allergic reaction. Where can you learn more? Go to https://WSO2.Vendormate. org and sign in to your [employer] account. Enter O105 in the Playrific box to learn more about \"Learning About Safe Use of Long-Acting Opioids. \"     If you do not have an account, please click on the \"Sign Up Now\" link. Current as of: November 20, 2019               Content Version: 12.6  © 6526-7723 [employer]. Care instructions adapted under license by [facility] ([facility]). If you have questions about a medical condition or this instruction, always ask your healthcare professional. [place] [address] any warranty or liability for your use of this information.

## 2021-04-08 RX ORDER — PROMETHAZINE HYDROCHLORIDE 25 MG/1
25 TABLET ORAL EVERY 6 HOURS PRN
Qty: 30 TABLET | Refills: 5 | Status: SHIPPED | OUTPATIENT
Start: 2021-04-08 | End: 2021-06-18 | Stop reason: SDUPTHER

## 2021-04-08 NOTE — TELEPHONE ENCOUNTER
Usman Zhou called requesting a refill of the below medication which has been pended for you:     Requested Prescriptions     Pending Prescriptions Disp Refills    promethazine (PHENERGAN) 25 MG tablet [Pharmacy Med Name: Ramya Vargheseumet HCL 25 MG TABS 25 Tablet] 30 tablet 5     Sig: TAKE 1 TABLET BY MOUTH EVERY 6 HOURS AS NEEDED FOR NAUSEA       Last Appointment Date: 1/29/2021  Next Appointment Date: 6/18/2021    No Known Allergies

## 2021-06-03 DIAGNOSIS — F41.9 ANXIETY: ICD-10-CM

## 2021-06-03 RX ORDER — DIAZEPAM 10 MG/1
TABLET ORAL
Qty: 60 TABLET | Refills: 1 | Status: SHIPPED | OUTPATIENT
Start: 2021-06-03 | End: 2021-06-04 | Stop reason: CLARIF

## 2021-06-04 DIAGNOSIS — F41.9 ANXIETY: ICD-10-CM

## 2021-06-04 NOTE — TELEPHONE ENCOUNTER
Zoe Lyles called to request a refill on his medication. Rx pending. Last office visit : 1/29/2021   Next office visit : 6/18/2021     Last UDS:   Amphetamine Screen, Urine   Date Value Ref Range Status   01/29/2021 neg  Final     Barbiturate Screen, Urine   Date Value Ref Range Status   01/29/2021 neg  Final     Benzodiazepine Screen, Urine   Date Value Ref Range Status   01/29/2021 positive  Final     Comment:     Pt takes ativan 0.5 and valium 10mg     Buprenorphine Urine   Date Value Ref Range Status   01/29/2021 neg  Final     Cocaine Metabolite Screen, Urine   Date Value Ref Range Status   01/29/2021 neg  Final     Gabapentin Screen, Urine   Date Value Ref Range Status   01/29/2021 neg  Final     MDMA, Urine   Date Value Ref Range Status   01/29/2021 neg  Final     Methamphetamine, Urine   Date Value Ref Range Status   01/29/2021 neg  Final     Opiate Scrn, Ur   Date Value Ref Range Status   01/29/2021 positive  Final     Comment:     Pt takes Norco      Oxycodone Screen, Ur   Date Value Ref Range Status   01/29/2021 neg  Final     PCP Screen, Urine   Date Value Ref Range Status   01/29/2021 neg  Final     Propoxyphene Screen, Urine   Date Value Ref Range Status   01/29/2021 neg  Final     THC Screen, Urine   Date Value Ref Range Status   01/29/2021 neg  Final     Tricyclic Antidepressants, Urine   Date Value Ref Range Status   01/29/2021 neg  Final       Last Patrecitram Malloy: 6/4/21  Medication Contract: 1/29/21    Requested Prescriptions      No prescriptions requested or ordered in this encounter         Please approve or refuse this medication.    Latricia Garland

## 2021-06-07 RX ORDER — DIAZEPAM 10 MG/1
TABLET ORAL
Qty: 60 TABLET | Refills: 0 | Status: SHIPPED | OUTPATIENT
Start: 2021-06-07 | End: 2021-06-29

## 2021-06-16 DIAGNOSIS — Z00.00 ROUTINE HEALTH MAINTENANCE: ICD-10-CM

## 2021-06-16 DIAGNOSIS — D50.9 IRON DEFICIENCY ANEMIA, UNSPECIFIED IRON DEFICIENCY ANEMIA TYPE: ICD-10-CM

## 2021-06-16 DIAGNOSIS — D50.9 IRON DEFICIENCY ANEMIA, UNSPECIFIED IRON DEFICIENCY ANEMIA TYPE: Primary | ICD-10-CM

## 2021-06-16 LAB
ALBUMIN SERPL-MCNC: 3.9 G/DL (ref 3.5–5.2)
ALP BLD-CCNC: 51 U/L (ref 40–130)
ALT SERPL-CCNC: 18 U/L (ref 5–41)
ANION GAP SERPL CALCULATED.3IONS-SCNC: 5 MMOL/L (ref 7–19)
AST SERPL-CCNC: 22 U/L (ref 5–40)
BASOPHILS ABSOLUTE: 0.1 K/UL (ref 0–0.2)
BASOPHILS RELATIVE PERCENT: 1.1 % (ref 0–1)
BILIRUB SERPL-MCNC: <0.2 MG/DL (ref 0.2–1.2)
BUN BLDV-MCNC: 16 MG/DL (ref 6–20)
CALCIUM SERPL-MCNC: 9.1 MG/DL (ref 8.6–10)
CHLORIDE BLD-SCNC: 104 MMOL/L (ref 98–111)
CHOLESTEROL, TOTAL: 113 MG/DL (ref 160–199)
CO2: 32 MMOL/L (ref 22–29)
CREAT SERPL-MCNC: 1.1 MG/DL (ref 0.5–1.2)
EOSINOPHILS ABSOLUTE: 0.2 K/UL (ref 0–0.6)
EOSINOPHILS RELATIVE PERCENT: 3.8 % (ref 0–5)
GFR AFRICAN AMERICAN: >59
GFR NON-AFRICAN AMERICAN: >60
GLUCOSE BLD-MCNC: 98 MG/DL (ref 74–109)
HCT VFR BLD CALC: 38.4 % (ref 42–52)
HDLC SERPL-MCNC: 49 MG/DL (ref 55–121)
HEMOGLOBIN: 12.3 G/DL (ref 14–18)
IMMATURE GRANULOCYTES #: 0 K/UL
LDL CHOLESTEROL CALCULATED: 52 MG/DL
LYMPHOCYTES ABSOLUTE: 1.5 K/UL (ref 1.1–4.5)
LYMPHOCYTES RELATIVE PERCENT: 27.1 % (ref 20–40)
MCH RBC QN AUTO: 29.8 PG (ref 27–31)
MCHC RBC AUTO-ENTMCNC: 32 G/DL (ref 33–37)
MCV RBC AUTO: 93 FL (ref 80–94)
MONOCYTES ABSOLUTE: 0.5 K/UL (ref 0–0.9)
MONOCYTES RELATIVE PERCENT: 9.4 % (ref 0–10)
NEUTROPHILS ABSOLUTE: 3.2 K/UL (ref 1.5–7.5)
NEUTROPHILS RELATIVE PERCENT: 58.4 % (ref 50–65)
PDW BLD-RTO: 13.5 % (ref 11.5–14.5)
PLATELET # BLD: 217 K/UL (ref 130–400)
PMV BLD AUTO: 9.8 FL (ref 9.4–12.4)
POTASSIUM SERPL-SCNC: 4.6 MMOL/L (ref 3.5–5)
RBC # BLD: 4.13 M/UL (ref 4.7–6.1)
SODIUM BLD-SCNC: 141 MMOL/L (ref 136–145)
TOTAL PROTEIN: 6.3 G/DL (ref 6.6–8.7)
TRIGL SERPL-MCNC: 59 MG/DL (ref 0–149)
TSH SERPL DL<=0.05 MIU/L-ACNC: 1.02 UIU/ML (ref 0.27–4.2)
WBC # BLD: 5.5 K/UL (ref 4.8–10.8)

## 2021-06-18 ENCOUNTER — OFFICE VISIT (OUTPATIENT)
Dept: INTERNAL MEDICINE | Age: 51
End: 2021-06-18

## 2021-06-18 VITALS
HEART RATE: 80 BPM | WEIGHT: 163 LBS | OXYGEN SATURATION: 99 % | SYSTOLIC BLOOD PRESSURE: 98 MMHG | DIASTOLIC BLOOD PRESSURE: 62 MMHG | BODY MASS INDEX: 22.08 KG/M2 | HEIGHT: 72 IN | RESPIRATION RATE: 20 BRPM

## 2021-06-18 DIAGNOSIS — R25.1 TREMOR: ICD-10-CM

## 2021-06-18 DIAGNOSIS — K21.9 GASTROESOPHAGEAL REFLUX DISEASE WITHOUT ESOPHAGITIS: ICD-10-CM

## 2021-06-18 DIAGNOSIS — G89.29 CHRONIC LOW BACK PAIN, UNSPECIFIED BACK PAIN LATERALITY, UNSPECIFIED WHETHER SCIATICA PRESENT: Primary | ICD-10-CM

## 2021-06-18 DIAGNOSIS — F41.9 ANXIETY DISORDER, UNSPECIFIED TYPE: ICD-10-CM

## 2021-06-18 DIAGNOSIS — Z79.899 LONG-TERM USE OF HIGH-RISK MEDICATION: ICD-10-CM

## 2021-06-18 DIAGNOSIS — M54.50 CHRONIC LOW BACK PAIN, UNSPECIFIED BACK PAIN LATERALITY, UNSPECIFIED WHETHER SCIATICA PRESENT: Primary | ICD-10-CM

## 2021-06-18 DIAGNOSIS — M62.838 MUSCLE SPASM: ICD-10-CM

## 2021-06-18 PROCEDURE — 99214 OFFICE O/P EST MOD 30 MIN: CPT | Performed by: INTERNAL MEDICINE

## 2021-06-18 RX ORDER — PROPRANOLOL HYDROCHLORIDE 60 MG/1
60 TABLET ORAL 2 TIMES DAILY
Qty: 180 TABLET | Refills: 1 | Status: SHIPPED | OUTPATIENT
Start: 2021-06-18 | End: 2021-11-29

## 2021-06-18 RX ORDER — PROMETHAZINE HYDROCHLORIDE 25 MG/1
25 TABLET ORAL 2 TIMES DAILY PRN
Qty: 60 TABLET | Refills: 0 | Status: SHIPPED | OUTPATIENT
Start: 2021-06-18 | End: 2021-07-12

## 2021-06-18 RX ORDER — OXYCODONE AND ACETAMINOPHEN 7.5; 325 MG/1; MG/1
1 TABLET ORAL EVERY 6 HOURS PRN
Qty: 120 TABLET | Refills: 0 | Status: SHIPPED | OUTPATIENT
Start: 2021-06-18 | End: 2021-07-12

## 2021-06-18 ASSESSMENT — ENCOUNTER SYMPTOMS
VOICE CHANGE: 0
TROUBLE SWALLOWING: 0
VOMITING: 0
CONSTIPATION: 0
EYE DISCHARGE: 0
WHEEZING: 0
COUGH: 0
BLOOD IN STOOL: 0
EYE REDNESS: 0
EYE ITCHING: 0
BACK PAIN: 1
EYE PAIN: 0
CHEST TIGHTNESS: 0
SINUS PRESSURE: 0
RHINORRHEA: 0
DIARRHEA: 0
SHORTNESS OF BREATH: 0
PHOTOPHOBIA: 0
ABDOMINAL PAIN: 0
NAUSEA: 0
ABDOMINAL DISTENTION: 0
COLOR CHANGE: 0
SORE THROAT: 0

## 2021-06-18 NOTE — PROGRESS NOTES
None     Social Determinants of Health     Financial Resource Strain: Low Risk     Difficulty of Paying Living Expenses: Not hard at all   Food Insecurity: No Food Insecurity    Worried About Running Out of Food in the Last Year: Never true    Arnol of Food in the Last Year: Never true   Transportation Needs: No Transportation Needs    Lack of Transportation (Medical): No    Lack of Transportation (Non-Medical): No   Physical Activity:     Days of Exercise per Week:     Minutes of Exercise per Session:    Stress:     Feeling of Stress :    Social Connections:     Frequency of Communication with Friends and Family:     Frequency of Social Gatherings with Friends and Family:     Attends [employer] Services:     Active Member of Clubs or Organizations:     Attends Club or Organization Meetings:     Marital Status:    Intimate Partner Violence:     Fear of Current or Ex-Partner:     Emotionally Abused:     Physically Abused:     Sexually Abused:       Family History   Problem Relation Age of Onset    No Known Problems Mother     No Known Problems Father         Current Outpatient Medications   Medication Sig Dispense Refill    propranolol (INDERAL) 60 MG tablet Take 1 tablet by mouth 2 times daily 180 tablet 1    oxyCODONE-acetaminophen (PERCOCET) 7.5-325 MG per tablet Take 1 tablet by mouth every 6 hours as needed for Pain for up to 30 days. Intended supply: 28 days 120 tablet 0    promethazine (PHENERGAN) 25 MG tablet Take 1 tablet by mouth 2 times daily as needed for Nausea 60 tablet 0    LORazepam (ATIVAN) 0.5 MG tablet TAKE 1 TABLET BY MOUTH EVERY 8 HOURS AS NEEDED FOR ANXIETY (NIGHTLY AS NEEDED) 90 tablet 0    diazePAM (VALIUM) 10 MG tablet BID PRN anxiety 60 tablet 0    omeprazole (PRILOSEC) 20 MG delayed release capsule Take 20 mg by mouth daily      loratadine (CLARITIN) 10 MG tablet Take 10 mg by mouth daily.  Multiple Vitamin (MULTI-VITAMIN PO) Take  by mouth.          No current facility-administered medications for this visit. There is no problem list on file for this patient. Review of Systems   Constitutional: Negative for activity change, appetite change, chills, diaphoresis, fatigue, fever and unexpected weight change. HENT: Negative for congestion, ear pain, hearing loss, nosebleeds, postnasal drip, rhinorrhea, sinus pressure, sneezing, sore throat, tinnitus, trouble swallowing and voice change. Eyes: Negative for photophobia, pain, discharge, redness, itching and visual disturbance. Respiratory: Negative for cough, chest tightness, shortness of breath and wheezing. Cardiovascular: Negative for chest pain, palpitations and leg swelling. Gastrointestinal: Negative for abdominal distention, abdominal pain, blood in stool, constipation, diarrhea, nausea and vomiting. Reflux   Endocrine: Negative for cold intolerance, heat intolerance, polydipsia, polyphagia and polyuria. Genitourinary: Negative for difficulty urinating, dysuria, enuresis, frequency, hematuria and urgency. Musculoskeletal: Positive for back pain and neck pain. Chronic low back and neck pain   Skin: Negative for color change, pallor, rash and wound. Allergic/Immunologic: Positive for environmental allergies. Negative for food allergies and immunocompromised state. Neurological: Positive for tremors. Negative for dizziness, syncope, speech difficulty, weakness, light-headedness, numbness and headaches. Psychiatric/Behavioral: Negative for agitation, behavioral problems, confusion, decreased concentration, dysphoric mood, hallucinations, self-injury, sleep disturbance and suicidal ideas. The patient is nervous/anxious. The patient is not hyperactive. Anxiety is stable       BP 98/62   Pulse 80   Resp 20   Ht 6' (1.829 m)   Wt 163 lb (73.9 kg)   SpO2 99%   BMI 22.11 kg/m²   Physical Exam  Vitals and nursing note reviewed.    Constitutional:       General: He is not in acute distress. Appearance: Normal appearance. He is well-developed and normal weight. He is not ill-appearing, toxic-appearing or diaphoretic. HENT:      Head: Normocephalic and atraumatic. Right Ear: Tympanic membrane, ear canal and external ear normal. There is no impacted cerumen. Left Ear: Tympanic membrane, ear canal and external ear normal. There is no impacted cerumen. Nose: Nose normal. No congestion or rhinorrhea. Mouth/Throat:      Mouth: Mucous membranes are moist.      Pharynx: Oropharynx is clear. No oropharyngeal exudate or posterior oropharyngeal erythema. Eyes:      General: No scleral icterus. Right eye: No discharge. Left eye: No discharge. Extraocular Movements: Extraocular movements intact. Conjunctiva/sclera: Conjunctivae normal.      Pupils: Pupils are equal, round, and reactive to light. Neck:      Thyroid: No thyromegaly. Vascular: No carotid bruit or JVD. Trachea: No tracheal deviation. Cardiovascular:      Rate and Rhythm: Normal rate and regular rhythm. Pulses: Normal pulses. Heart sounds: Normal heart sounds. No murmur heard. No friction rub. No gallop. Pulmonary:      Effort: Pulmonary effort is normal. No respiratory distress. Breath sounds: Normal breath sounds. No stridor. No wheezing, rhonchi or rales. Chest:      Chest wall: No tenderness. Abdominal:      General: Bowel sounds are normal. There is no distension. Palpations: Abdomen is soft. There is no mass. Tenderness: There is no abdominal tenderness. There is no right CVA tenderness, left CVA tenderness, guarding or rebound. Hernia: No hernia is present. Musculoskeletal:         General: Tenderness present. No swelling, deformity or signs of injury. Normal range of motion. Cervical back: Normal range of motion and neck supple. No rigidity. No muscular tenderness. Right lower leg: No edema.       Left lower leg: No edema. Comments: Gait antalgic. There is tenderness over the lower lumbar spine   Lymphadenopathy:      Cervical: No cervical adenopathy. Skin:     General: Skin is warm and dry. Capillary Refill: Capillary refill takes less than 2 seconds. Coloration: Skin is not jaundiced or pale. Findings: No bruising, erythema, lesion or rash. Neurological:      General: No focal deficit present. Mental Status: He is alert and oriented to person, place, and time. Mental status is at baseline. Cranial Nerves: No cranial nerve deficit. Sensory: No sensory deficit. Motor: No weakness or abnormal muscle tone. Coordination: Coordination normal.      Gait: Gait normal.      Deep Tendon Reflexes: Reflexes are normal and symmetric. Reflexes normal.      Comments: Tremor to hands noted   Psychiatric:         Mood and Affect: Mood normal.         Behavior: Behavior normal.         Thought Content: Thought content normal.         Judgment: Judgment normal.         1. Chronic low back pain, unspecified back pain laterality, unspecified whether sciatica present    2. Long-term use of high-risk medication    3. Tremor    4. Anxiety disorder, unspecified type    5. Muscle spasm    6. Gastroesophageal reflux disease without esophagitis        ASSESSMENT/PLAN:    61-year-old male here for follow-up    1. Chronic back pain: Patient agrees to try Percocet alone for pain rather than Percocet and hydrocodone. 2.  Chronic tremor: Try Inderal 60 mg p.o. twice daily    3. Anxiety: Continue lorazepam    4. Muscle spasms: Continue Valium. This is also helpful for his tremors    5. Acid reflux: Stable on omeprazole    Yasir Corral was seen today for 3 month follow-up. Diagnoses and all orders for this visit:    Chronic low back pain, unspecified back pain laterality, unspecified whether sciatica present  -     oxyCODONE-acetaminophen (PERCOCET) 7.5-325 MG per tablet;  Take 1 tablet by mouth

## 2021-08-13 RX ORDER — NALOXONE HYDROCHLORIDE 4 MG/.1ML
1 SPRAY NASAL PRN
Qty: 1 EACH | Refills: 0 | Status: SHIPPED | OUTPATIENT
Start: 2021-08-13 | End: 2021-11-15 | Stop reason: SDUPTHER

## 2021-08-25 DIAGNOSIS — F41.9 ANXIETY: ICD-10-CM

## 2021-08-27 RX ORDER — DIAZEPAM 10 MG/1
TABLET ORAL
Qty: 60 TABLET | Refills: 0 | Status: SHIPPED | OUTPATIENT
Start: 2021-08-28 | End: 2021-09-24 | Stop reason: SDUPTHER

## 2021-09-09 ENCOUNTER — TELEPHONE (OUTPATIENT)
Dept: INTERNAL MEDICINE | Age: 51
End: 2021-09-09

## 2021-09-09 DIAGNOSIS — F41.9 ANXIETY DISORDER, UNSPECIFIED TYPE: ICD-10-CM

## 2021-09-09 RX ORDER — LORAZEPAM 0.5 MG/1
TABLET ORAL
Qty: 90 TABLET | Refills: 0 | Status: SHIPPED | OUTPATIENT
Start: 2021-09-09 | End: 2021-10-06

## 2021-09-09 NOTE — TELEPHONE ENCOUNTER
One of his prescriptions was due today. I took care of that 1.   He underwent have to wait till the 13th

## 2021-09-13 NOTE — TELEPHONE ENCOUNTER
Pt called and stated that we sent his refill for his percocet, he is out of town and wants to know if we can send this to a pharmacy in [place], [place]. I explained that we probably could not due to it being controlled. Please advise.

## 2021-09-24 ENCOUNTER — TELEPHONE (OUTPATIENT)
Dept: INTERNAL MEDICINE | Age: 51
End: 2021-09-24

## 2021-09-24 DIAGNOSIS — F41.9 ANXIETY: ICD-10-CM

## 2021-09-24 NOTE — TELEPHONE ENCOUNTER
Elías Pieter called to request a refill on his medication. Rx pending. Last office visit : 6/18/2021   Next office visit : 11/26/2021     Last UDS:   Amphetamine Screen, Urine   Date Value Ref Range Status   01/29/2021 neg  Final     Barbiturate Screen, Urine   Date Value Ref Range Status   01/29/2021 neg  Final     Benzodiazepine Screen, Urine   Date Value Ref Range Status   01/29/2021 positive  Final     Comment:     Pt takes ativan 0.5 and valium 10mg     Buprenorphine Urine   Date Value Ref Range Status   01/29/2021 neg  Final     Cocaine Metabolite Screen, Urine   Date Value Ref Range Status   01/29/2021 neg  Final     Gabapentin Screen, Urine   Date Value Ref Range Status   01/29/2021 neg  Final     MDMA, Urine   Date Value Ref Range Status   01/29/2021 neg  Final     Methamphetamine, Urine   Date Value Ref Range Status   01/29/2021 neg  Final     Opiate Scrn, Ur   Date Value Ref Range Status   01/29/2021 positive  Final     Comment:     Pt takes Norco      Oxycodone Screen, Ur   Date Value Ref Range Status   01/29/2021 neg  Final     PCP Screen, Urine   Date Value Ref Range Status   01/29/2021 neg  Final     Propoxyphene Screen, Urine   Date Value Ref Range Status   01/29/2021 neg  Final     THC Screen, Urine   Date Value Ref Range Status   01/29/2021 neg  Final     Tricyclic Antidepressants, Urine   Date Value Ref Range Status   01/29/2021 neg  Final       Last [facility]: 9/7/21  Medication Contract: 1/29/21    Requested Prescriptions      No prescriptions requested or ordered in this encounter         Please approve or refuse this medication.    Vinita Ramirez

## 2021-09-27 RX ORDER — DIAZEPAM 10 MG/1
TABLET ORAL
Qty: 60 TABLET | Refills: 0 | Status: SHIPPED | OUTPATIENT
Start: 2021-09-28 | End: 2021-10-27

## 2021-10-07 ENCOUNTER — TELEPHONE (OUTPATIENT)
Dept: INTERNAL MEDICINE | Age: 51
End: 2021-10-07

## 2021-10-28 RX ORDER — PROMETHAZINE HYDROCHLORIDE 25 MG/1
25 TABLET ORAL 2 TIMES DAILY PRN
Qty: 60 TABLET | Refills: 0 | Status: SHIPPED | OUTPATIENT
Start: 2021-10-28 | End: 2021-12-23

## 2021-10-28 NOTE — TELEPHONE ENCOUNTER
Cherylene Leas called requesting a refill of the below medication which has been pended for you:     Requested Prescriptions     Pending Prescriptions Disp Refills    promethazine (PHENERGAN) 25 MG tablet [Pharmacy Med Name: Hudson Schultz HCL 25 MG TABS 25 Tablet] 60 tablet 0     Sig: TAKE 1 TABLET BY MOUTH 2 TIMES DAILY AS NEEDED FOR NAUSEA       Last Appointment Date: 6/18/2021  Next Appointment Date: 11/30/2021    No Known Allergies

## 2021-11-15 RX ORDER — NALOXONE HYDROCHLORIDE 4 MG/.1ML
1 SPRAY NASAL PRN
Qty: 1 EACH | Refills: 0 | Status: SHIPPED | OUTPATIENT
Start: 2021-11-15

## 2021-11-29 RX ORDER — PROPRANOLOL HYDROCHLORIDE 60 MG/1
60 TABLET ORAL 2 TIMES DAILY
Qty: 60 TABLET | Refills: 1 | Status: SHIPPED | OUTPATIENT
Start: 2021-11-29 | End: 2021-12-23

## 2021-11-30 ENCOUNTER — OFFICE VISIT (OUTPATIENT)
Dept: INTERNAL MEDICINE | Age: 51
End: 2021-11-30

## 2021-11-30 VITALS
HEIGHT: 72 IN | OXYGEN SATURATION: 100 % | WEIGHT: 162.8 LBS | SYSTOLIC BLOOD PRESSURE: 112 MMHG | RESPIRATION RATE: 18 BRPM | BODY MASS INDEX: 22.05 KG/M2 | HEART RATE: 72 BPM | DIASTOLIC BLOOD PRESSURE: 66 MMHG

## 2021-11-30 DIAGNOSIS — Z91.09 ENVIRONMENTAL ALLERGIES: ICD-10-CM

## 2021-11-30 DIAGNOSIS — G25.0 FAMILIAL TREMOR: Primary | ICD-10-CM

## 2021-11-30 DIAGNOSIS — G89.29 CHRONIC LOW BACK PAIN, UNSPECIFIED BACK PAIN LATERALITY, UNSPECIFIED WHETHER SCIATICA PRESENT: ICD-10-CM

## 2021-11-30 DIAGNOSIS — M54.2 CERVICALGIA OF OCCIPITO-ATLANTO-AXIAL REGION: ICD-10-CM

## 2021-11-30 DIAGNOSIS — H61.23 BILATERAL IMPACTED CERUMEN: ICD-10-CM

## 2021-11-30 DIAGNOSIS — F41.9 ANXIETY DISORDER, UNSPECIFIED TYPE: ICD-10-CM

## 2021-11-30 DIAGNOSIS — M54.50 CHRONIC LOW BACK PAIN, UNSPECIFIED BACK PAIN LATERALITY, UNSPECIFIED WHETHER SCIATICA PRESENT: ICD-10-CM

## 2021-11-30 DIAGNOSIS — Z79.899 HIGH RISK MEDICATION USE: ICD-10-CM

## 2021-11-30 DIAGNOSIS — K21.9 GASTROESOPHAGEAL REFLUX DISEASE WITHOUT ESOPHAGITIS: ICD-10-CM

## 2021-11-30 LAB
ALCOHOL URINE: NORMAL
AMPHETAMINE SCREEN, URINE: NORMAL
BARBITURATE SCREEN, URINE: NORMAL
BENZODIAZEPINE SCREEN, URINE: POSITIVE
BUPRENORPHINE URINE: NORMAL
COCAINE METABOLITE SCREEN URINE: NORMAL
FENTANYL SCREEN, URINE: NORMAL
GABAPENTIN SCREEN, URINE: NORMAL
MDMA URINE: NORMAL
METHADONE SCREEN, URINE: NORMAL
METHAMPHETAMINE, URINE: NORMAL
OPIATE SCREEN URINE: NORMAL
OXYCODONE SCREEN URINE: POSITIVE
PHENCYCLIDINE SCREEN URINE: NORMAL
PROPOXYPHENE SCREEN, URINE: NORMAL
SYNTHETIC CANNABINOIDS(K2) SCREEN, URINE: NORMAL
THC SCREEN, URINE: NORMAL
TRAMADOL SCREEN URINE: NORMAL
TRICYCLIC ANTIDEPRESSANTS, UR: NORMAL

## 2021-11-30 PROCEDURE — 99214 OFFICE O/P EST MOD 30 MIN: CPT | Performed by: INTERNAL MEDICINE

## 2021-11-30 PROCEDURE — 80305 DRUG TEST PRSMV DIR OPT OBS: CPT | Performed by: INTERNAL MEDICINE

## 2021-11-30 NOTE — PROGRESS NOTES
Chief Complaint   Patient presents with    6 Month Follow-Up       HPI: Bautista Tinoco is a 46 y.o. male is here for evaluation of chronic neck pain, anxiety, familial tremors, and acid reflux. His tremors are relatively stable. He states that sometimes, he thinks that they are a little worse. However, he thinks the combination of propanolol and Valium do keep the tremors fairly well controlled. He is a  and is difficult for him to use his hands at times. Lorazepam does help with nocturnal anxiety. He does complain of his ears feeling full. We will do a bilateral ear lavage today. His acid reflux is well controlled. Sometimes he has some sinus drainage.   Claritin is helpful for this    Past Medical History:   Diagnosis Date    Allergic rhinitis     Cellulitis     Gastroparesis     GERD (gastroesophageal reflux disease)       Past Surgical History:   Procedure Laterality Date    CHOLECYSTECTOMY      [place] COLONOSCOPY FLX DX W/COLLJ SPEC WHEN PFRMD N/A 2018    Dr Antony Way, 10 yr recall      Social History     Socioeconomic History    Marital status:      Spouse name: None    Number of children: None    Years of education: None    Highest education level: None   Occupational History     Employer: SELF-EMPLOYED   Tobacco Use    Smoking status: Former Smoker     Packs/day: 0.50     Years: 3.00     Pack years: 1.50     Types: Cigarettes     Quit date: 12/15/1990     Years since quittin.9    Smokeless tobacco: Never Used   Vaping Use    Vaping Use: Never used   Substance and Sexual Activity    Alcohol use: No    Drug use: No    Sexual activity: None   Other Topics Concern    None   Social History Narrative    None     Social Determinants of Health     Financial Resource Strain: Low Risk     Difficulty of Paying Living Expenses: Not hard at all   Food Insecurity: No Food Insecurity    Worried About [address] in the Last Year: Never true    Arnol of [employer] in the Last Year: Never true   Transportation Needs: No Transportation Needs    Lack of Transportation (Medical): No    Lack of Transportation (Non-Medical): No   Physical Activity:     Days of Exercise per Week: Not on file    Minutes of Exercise per Session: Not on file   Stress:     Feeling of Stress : Not on file   Social Connections:     Frequency of Communication with Friends and Family: Not on file    Frequency of Social Gatherings with Friends and Family: Not on file    Attends Restoration Services: Not on file    Active Member of [address] [place] [address] [email or web address] or Organizations: Not on file    Attends Club or Organization Meetings: Not on file    Marital Status: Not on file   Intimate Partner Violence:     Fear of Current or Ex-Partner: Not on file    Emotionally Abused: Not on file    Physically Abused: Not on file    Sexually Abused: Not on file   Housing Stability:     Unable to Pay for Housing in the Last Year: Not on file    Number of Places Lived in the Last Year: Not on file    Unstable Housing in the Last Year: Not on file      Family History   Problem Relation Age of Onset    No Known Problems Mother     No Known Problems Father         Current Outpatient Medications   Medication Sig Dispense Refill    propranolol (INDERAL) 60 MG tablet TAKE 1 TABLET BY MOUTH 2 TIMES DAILY 60 tablet 1    diazePAM (VALIUM) 10 MG tablet TAKE 1 TABLET BY MOUTH TWO TIMES A DAY AS NEEDED FOR ANXIETY 60 tablet 0    oxyCODONE-acetaminophen (PERCOCET) 7.5-325 MG per tablet TAKE 1 TABLET BY MOUTH EVERY 6 HOURS AS NEEDED FOR PAIN FOR UP TO 30 DAYS.  120 tablet 0    naloxone 4 MG/0.1ML LIQD nasal spray 1 spray by Nasal route as needed for Opioid Reversal 1 each 0    LORazepam (ATIVAN) 0.5 MG tablet TAKE 1 TABLET BY MOUTH EVERY 8 HOURS AS NEEDED FOR ANXIETY (NIGHTLY AS NEEDED) 90 tablet 0    promethazine (PHENERGAN) 25 MG tablet TAKE 1 TABLET BY MOUTH 2 TIMES DAILY AS NEEDED FOR NAUSEA 60 tablet 0    omeprazole (PRILOSEC) 20 MG delayed release capsule Take 20 mg by mouth daily      loratadine (CLARITIN) 10 MG tablet Take 10 mg by mouth daily.  Multiple Vitamin (MULTI-VITAMIN PO) Take by mouth daily        No current facility-administered medications for this visit. There is no problem list on file for this patient. Review of Systems   Constitutional: Negative for activity change, appetite change, chills, diaphoresis, fatigue, fever and unexpected weight change. HENT: Negative for congestion, ear pain, hearing loss, nosebleeds, postnasal drip, rhinorrhea, sinus pressure, sneezing, sore throat, tinnitus, trouble swallowing and voice change. Eyes: Negative for photophobia, pain, discharge, redness, itching and visual disturbance. Respiratory: Negative for cough, chest tightness, shortness of breath and wheezing. Cardiovascular: Negative for chest pain, palpitations and leg swelling. Gastrointestinal: Negative for abdominal distention, abdominal pain, blood in stool, constipation, diarrhea, nausea and vomiting. Reflux   Endocrine: Negative for cold intolerance, heat intolerance, polydipsia, polyphagia and polyuria. Genitourinary: Negative for difficulty urinating, dysuria, enuresis, frequency, hematuria and urgency. Musculoskeletal: Positive for back pain and neck pain. Chronic low back and neck pain   Skin: Negative for color change, pallor, rash and wound. Allergic/Immunologic: Positive for environmental allergies. Negative for food allergies and immunocompromised state. Neurological: Positive for tremors. Negative for dizziness, syncope, speech difficulty, weakness, light-headedness, numbness and headaches. Psychiatric/Behavioral: Negative for agitation, behavioral problems, confusion, decreased concentration, dysphoric mood, hallucinations, self-injury, sleep disturbance and suicidal ideas. The patient is nervous/anxious. The patient is not hyperactive.          Anxiety is stable /66 (Site: Left Upper Arm, Position: Sitting)   Pulse 72   Resp 18   Ht 6' (1.829 m)   Wt 162 lb 12.8 oz (73.8 kg)   SpO2 100%   BMI 22.08 kg/m²   Physical Exam  Vitals and nursing note reviewed. Constitutional:       General: He is not in acute distress. Appearance: Normal appearance. He is well-developed and normal weight. He is not ill-appearing, toxic-appearing or diaphoretic. HENT:      Head: Normocephalic and atraumatic. Right Ear: Tympanic membrane, ear canal and external ear normal. There is no impacted cerumen. Left Ear: Tympanic membrane, ear canal and external ear normal. There is no impacted cerumen. Nose: Nose normal. No congestion or rhinorrhea. Mouth/Throat:      Mouth: Mucous membranes are moist.      Pharynx: Oropharynx is clear. No oropharyngeal exudate or posterior oropharyngeal erythema. Eyes:      General: No scleral icterus. Right eye: No discharge. Left eye: No discharge. Extraocular Movements: Extraocular movements intact. Conjunctiva/sclera: Conjunctivae normal.      Pupils: Pupils are equal, round, and reactive to light. Neck:      Thyroid: No thyromegaly. Vascular: No carotid bruit or JVD. Trachea: No tracheal deviation. Cardiovascular:      Rate and Rhythm: Normal rate and regular rhythm. Pulses: Normal pulses. Heart sounds: Normal heart sounds. No murmur heard. No friction rub. No gallop. Pulmonary:      Effort: Pulmonary effort is normal. No respiratory distress. Breath sounds: Normal breath sounds. No stridor. No wheezing, rhonchi or rales. Chest:      Chest wall: No tenderness. Abdominal:      General: Bowel sounds are normal. There is no distension. Palpations: Abdomen is soft. There is no mass. Tenderness: There is no abdominal tenderness. There is no right CVA tenderness, left CVA tenderness, guarding or rebound. Hernia: No hernia is present. Stable on Claritin    6. Bilateral ear lavage done today for cerumen impaction    Michele Frederick was seen today for 6 month follow-up. Diagnoses and all orders for this visit:    Familial tremor    High risk medication use  -     POCT Rapid Drug Screen    Anxiety disorder, unspecified type  -     CBC Auto Differential; Future  -     Comprehensive Metabolic Panel; Future  -     Lipid Panel; Future  -     TSH without Reflex; Future  -     PSA screening;  Future    Chronic low back pain, unspecified back pain laterality, unspecified whether sciatica present    Cervicalgia of [id number]-[id number] region    Gastroesophageal reflux disease without esophagitis    Environmental allergies    Bilateral impacted cerumen    Other orders  -     Ear wax removal          Return in about 3 months (around 2/28/2022) for physical.     Orders Placed This Encounter   Procedures    CBC Auto Differential     Fast 12 hours     Standing Status:   Future     Standing Expiration Date:   11/30/2022    Comprehensive Metabolic Panel     Fasting 12 hours     Standing Status:   Future     Standing Expiration Date:   11/30/2022    Lipid Panel     Standing Status:   Future     Standing Expiration Date:   11/30/2022     Order Specific Question:   Is Patient Fasting?/# of Hours     Answer:   12    TSH without Reflex     Fast 12 hours     Standing Status:   Future     Standing Expiration Date:   11/30/2022    PSA screening     Standing Status:   Future     Standing Expiration Date:   11/30/2022    Ear wax removal   Maurice Burnett MD

## 2021-12-01 ASSESSMENT — ENCOUNTER SYMPTOMS
VOMITING: 0
SINUS PRESSURE: 0
TROUBLE SWALLOWING: 0
NAUSEA: 0
COUGH: 0
ABDOMINAL DISTENTION: 0
BLOOD IN STOOL: 0
WHEEZING: 0
SORE THROAT: 0
RHINORRHEA: 0
DIARRHEA: 0
EYE DISCHARGE: 0
CHEST TIGHTNESS: 0
ABDOMINAL PAIN: 0
BACK PAIN: 1
VOICE CHANGE: 0
EYE ITCHING: 0
EYE REDNESS: 0
PHOTOPHOBIA: 0
SHORTNESS OF BREATH: 0
COLOR CHANGE: 0
CONSTIPATION: 0
EYE PAIN: 0

## 2021-12-23 RX ORDER — PROMETHAZINE HYDROCHLORIDE 25 MG/1
25 TABLET ORAL 2 TIMES DAILY PRN
Qty: 60 TABLET | Refills: 0 | Status: SHIPPED | OUTPATIENT
Start: 2021-12-23 | End: 2022-01-27

## 2021-12-23 RX ORDER — PROPRANOLOL HYDROCHLORIDE 60 MG/1
60 TABLET ORAL 2 TIMES DAILY
Qty: 60 TABLET | Refills: 1 | Status: SHIPPED | OUTPATIENT
Start: 2021-12-23 | End: 2022-02-02

## 2021-12-23 NOTE — TELEPHONE ENCOUNTER
Shade Crooks called to request a refill on his medication.       Last office visit : 11/30/2021   Next office visit : 3/3/2022     Requested Prescriptions     Pending Prescriptions Disp Refills    promethazine (PHENERGAN) 25 MG tablet [Pharmacy Med Name: PROMETHAZINE HCL 25 MG TABS 25 Tablet] 60 tablet 0     Sig: TAKE 1 TABLET BY MOUTH 2 TIMES DAILY AS NEEDED FOR NAUSEA    propranolol (INDERAL) 60 MG tablet [Pharmacy Med Name: PROPRANOLOL HCL 60 MG TABS 60 Tablet] 60 tablet 1     Sig: TAKE 1 TABLET BY MOUTH 2 TIMES DAILY            Moo Garcia

## 2022-01-27 RX ORDER — PROMETHAZINE HYDROCHLORIDE 25 MG/1
25 TABLET ORAL 2 TIMES DAILY PRN
Qty: 60 TABLET | Refills: 0 | Status: SHIPPED | OUTPATIENT
Start: 2022-01-27 | End: 2022-05-19

## 2022-01-27 NOTE — TELEPHONE ENCOUNTER
Mehran Saucedo called requesting a refill of the below medication which has been pended for you:     Requested Prescriptions     Pending Prescriptions Disp Refills    promethazine (PHENERGAN) 25 MG tablet [Pharmacy Med Name: Chiara Pandya HCL 25 MG TABS 25 Tablet] 60 tablet 0     Sig: TAKE 1 TABLET BY MOUTH 2 TIMES DAILY AS NEEDED FOR NAUSEA       Last Appointment Date: 11/30/2021  Next Appointment Date: 3/3/2022    No Known Allergies

## 2022-02-09 ENCOUNTER — HOSPITAL ENCOUNTER (EMERGENCY)
Facility: HOSPITAL | Age: 52
Discharge: HOME | End: 2022-02-09
Admitting: EMERGENCY MEDICINE

## 2022-02-09 VITALS
DIASTOLIC BLOOD PRESSURE: 60 MMHG | HEIGHT: 72 IN | SYSTOLIC BLOOD PRESSURE: 92 MMHG | WEIGHT: 168 LBS | OXYGEN SATURATION: 98 % | BODY MASS INDEX: 22.75 KG/M2 | RESPIRATION RATE: 19 BRPM | HEART RATE: 65 BPM | TEMPERATURE: 97.9 F

## 2022-02-09 DIAGNOSIS — T23.209A SUPERFICIAL PARTIAL THICKNESS BURN OF HAND: Primary | ICD-10-CM

## 2022-02-09 PROCEDURE — 96372 THER/PROPH/DIAG INJ SC/IM: CPT

## 2022-02-09 PROCEDURE — 99283 EMERGENCY DEPT VISIT LOW MDM: CPT

## 2022-02-09 PROCEDURE — 0 HYDROMORPHONE 1 MG/ML SOLUTION: Performed by: NURSE PRACTITIONER

## 2022-02-09 PROCEDURE — 63710000001 ONDANSETRON ODT 4 MG TABLET DISPERSIBLE: Performed by: NURSE PRACTITIONER

## 2022-02-09 RX ORDER — OXYCODONE AND ACETAMINOPHEN 7.5; 325 MG/1; MG/1
1 TABLET ORAL EVERY 6 HOURS PRN
COMMUNITY

## 2022-02-09 RX ORDER — ONDANSETRON 4 MG/1
4 TABLET, ORALLY DISINTEGRATING ORAL ONCE
Status: COMPLETED | OUTPATIENT
Start: 2022-02-09 | End: 2022-02-09

## 2022-02-09 RX ORDER — PROPRANOLOL HYDROCHLORIDE 20 MG/1
20 TABLET ORAL 2 TIMES DAILY
COMMUNITY

## 2022-02-09 RX ORDER — PROMETHAZINE HYDROCHLORIDE 25 MG/1
25 TABLET ORAL 2 TIMES DAILY PRN
COMMUNITY

## 2022-02-09 RX ORDER — GINSENG 100 MG
1 CAPSULE ORAL 2 TIMES DAILY
Qty: 28 G | Refills: 0 | Status: SHIPPED | OUTPATIENT
Start: 2022-02-09 | End: 2022-02-16

## 2022-02-09 RX ORDER — DIAZEPAM 10 MG/1
10 TABLET ORAL 2 TIMES DAILY PRN
COMMUNITY

## 2022-02-09 RX ORDER — OXYCODONE AND ACETAMINOPHEN 7.5; 325 MG/1; MG/1
1 TABLET ORAL ONCE
Status: COMPLETED | OUTPATIENT
Start: 2022-02-09 | End: 2022-02-09

## 2022-02-09 RX ADMIN — HYDROMORPHONE HYDROCHLORIDE 1 MG: 1 INJECTION, SOLUTION INTRAMUSCULAR; INTRAVENOUS; SUBCUTANEOUS at 14:13

## 2022-02-09 RX ADMIN — OXYCODONE HYDROCHLORIDE AND ACETAMINOPHEN 1 TABLET: 7.5; 325 TABLET ORAL at 12:12

## 2022-02-09 RX ADMIN — ONDANSETRON 4 MG: 4 TABLET, ORALLY DISINTEGRATING ORAL at 12:12
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
## 2022-02-09 NOTE — DISCHARGE INSTRUCTIONS
Please follow up with Dr. Gregg on 2/11/2022 at 1:00pm at [facility]. Return to the ER if any new or worsening symptoms. Please use bacitracin and xeroform dressings to hand. Keep wound clean and dry.

## 2022-02-09 NOTE — ED NOTES
Dressing applied to left hand consisting of Xerfoam, Bacitracin ,4x4 and kerlex.     Anand Multani, RAJWINDERN  02/09/22 1538

## 2022-02-10 ENCOUNTER — TELEPHONE (OUTPATIENT)
Dept: INTERNAL MEDICINE | Age: 52
End: 2022-02-10
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
## 2022-02-28 DIAGNOSIS — F41.9 ANXIETY: ICD-10-CM

## 2022-02-28 RX ORDER — DIAZEPAM 10 MG/1
TABLET ORAL
Qty: 60 TABLET | Refills: 0 | Status: SHIPPED | OUTPATIENT
Start: 2022-02-28 | End: 2022-03-30

## 2022-02-28 NOTE — TELEPHONE ENCOUNTER
Nano Myles called requesting a refill of the below medication which has been pended for you:     Requested Prescriptions     Pending Prescriptions Disp Refills    diazePAM (VALIUM) 10 MG tablet [Pharmacy Med Name: diazePAM 10 MG TABS 10 Tablet] 60 tablet 0     Sig: TAKE 1 TABLET BY MOUTH TWO TIMES A DAY AS NEEDED FOR ANXIETY       Last Appointment Date: 11/30/2021  Next Appointment Date: 3/3/2022    No Known Allergies

## 2022-03-01 DIAGNOSIS — F41.9 ANXIETY DISORDER, UNSPECIFIED TYPE: ICD-10-CM

## 2022-03-01 LAB
ALBUMIN SERPL-MCNC: 4.6 G/DL (ref 3.5–5.2)
ALP BLD-CCNC: 78 U/L (ref 40–130)
ALT SERPL-CCNC: 21 U/L (ref 5–41)
ANION GAP SERPL CALCULATED.3IONS-SCNC: 15 MMOL/L (ref 7–19)
AST SERPL-CCNC: 29 U/L (ref 5–40)
BASOPHILS ABSOLUTE: 0.1 K/UL (ref 0–0.2)
BASOPHILS RELATIVE PERCENT: 1.2 % (ref 0–1)
BILIRUB SERPL-MCNC: 0.4 MG/DL (ref 0.2–1.2)
BUN BLDV-MCNC: 20 MG/DL (ref 6–20)
CALCIUM SERPL-MCNC: 10.6 MG/DL (ref 8.6–10)
CHLORIDE BLD-SCNC: 100 MMOL/L (ref 98–111)
CHOLESTEROL, TOTAL: 144 MG/DL (ref 160–199)
CO2: 28 MMOL/L (ref 22–29)
CREAT SERPL-MCNC: 1.2 MG/DL (ref 0.5–1.2)
EOSINOPHILS ABSOLUTE: 0.1 K/UL (ref 0–0.6)
EOSINOPHILS RELATIVE PERCENT: 0.7 % (ref 0–5)
GFR AFRICAN AMERICAN: >59
GFR NON-AFRICAN AMERICAN: >60
GLUCOSE BLD-MCNC: 99 MG/DL (ref 74–109)
HCT VFR BLD CALC: 41.4 % (ref 42–52)
HDLC SERPL-MCNC: 53 MG/DL (ref 55–121)
HEMOGLOBIN: 12.8 G/DL (ref 14–18)
IMMATURE GRANULOCYTES #: 0 K/UL
LDL CHOLESTEROL CALCULATED: 71 MG/DL
LYMPHOCYTES ABSOLUTE: 1.5 K/UL (ref 1.1–4.5)
LYMPHOCYTES RELATIVE PERCENT: 20.6 % (ref 20–40)
MCH RBC QN AUTO: 29 PG (ref 27–31)
MCHC RBC AUTO-ENTMCNC: 30.9 G/DL (ref 33–37)
MCV RBC AUTO: 93.9 FL (ref 80–94)
MONOCYTES ABSOLUTE: 0.6 K/UL (ref 0–0.9)
MONOCYTES RELATIVE PERCENT: 7.8 % (ref 0–10)
NEUTROPHILS ABSOLUTE: 5.2 K/UL (ref 1.5–7.5)
NEUTROPHILS RELATIVE PERCENT: 69.3 % (ref 50–65)
PDW BLD-RTO: 13.9 % (ref 11.5–14.5)
PLATELET # BLD: 526 K/UL (ref 130–400)
PMV BLD AUTO: 9.2 FL (ref 9.4–12.4)
POTASSIUM SERPL-SCNC: 4.6 MMOL/L (ref 3.5–5)
PROSTATE SPECIFIC ANTIGEN: 3.12 NG/ML (ref 0–4)
RBC # BLD: 4.41 M/UL (ref 4.7–6.1)
SODIUM BLD-SCNC: 143 MMOL/L (ref 136–145)
TOTAL PROTEIN: 8 G/DL (ref 6.6–8.7)
TRIGL SERPL-MCNC: 99 MG/DL (ref 0–149)
TSH SERPL DL<=0.05 MIU/L-ACNC: 1.59 UIU/ML (ref 0.27–4.2)
WBC # BLD: 7.5 K/UL (ref 4.8–10.8)

## 2022-03-01 NOTE — TELEPHONE ENCOUNTER
Shayna Viveros called requesting a refill of the below medication which has been pended for you:     Requested Prescriptions     Pending Prescriptions Disp Refills    LORazepam (ATIVAN) 0.5 MG tablet [Pharmacy Med Name: LORazepam 0.5 MG TABS 0.5 Tablet] 90 tablet 0     Sig: TAKE 1 TABLET BY MOUTH EVERY 8 HOURS AS NEEDED FOR ANXIETY       Last Appointment Date: 11/30/2021  Next Appointment Date: 3/3/2022    No Known Allergies

## 2022-03-03 ENCOUNTER — OFFICE VISIT (OUTPATIENT)
Dept: INTERNAL MEDICINE | Age: 52
End: 2022-03-03

## 2022-03-03 VITALS
DIASTOLIC BLOOD PRESSURE: 70 MMHG | HEART RATE: 69 BPM | HEIGHT: 72 IN | SYSTOLIC BLOOD PRESSURE: 118 MMHG | BODY MASS INDEX: 21.35 KG/M2 | OXYGEN SATURATION: 97 % | WEIGHT: 157.6 LBS

## 2022-03-03 DIAGNOSIS — T23.262S PARTIAL THICKNESS BURN OF BACK OF LEFT HAND, SEQUELA: ICD-10-CM

## 2022-03-03 DIAGNOSIS — G25.0 FAMILIAL TREMOR: ICD-10-CM

## 2022-03-03 DIAGNOSIS — Z91.09 ENVIRONMENTAL ALLERGIES: ICD-10-CM

## 2022-03-03 DIAGNOSIS — Z00.00 ROUTINE HEALTH MAINTENANCE: Primary | ICD-10-CM

## 2022-03-03 DIAGNOSIS — G89.29 CHRONIC LOW BACK PAIN, UNSPECIFIED BACK PAIN LATERALITY, UNSPECIFIED WHETHER SCIATICA PRESENT: ICD-10-CM

## 2022-03-03 DIAGNOSIS — M54.50 CHRONIC LOW BACK PAIN, UNSPECIFIED BACK PAIN LATERALITY, UNSPECIFIED WHETHER SCIATICA PRESENT: ICD-10-CM

## 2022-03-03 DIAGNOSIS — F41.9 ANXIETY DISORDER, UNSPECIFIED TYPE: ICD-10-CM

## 2022-03-03 PROBLEM — T23.202A PARTIAL THICKNESS BURN OF LEFT HAND: Status: ACTIVE | Noted: 2022-02-11

## 2022-03-03 PROCEDURE — 99396 PREV VISIT EST AGE 40-64: CPT | Performed by: INTERNAL MEDICINE

## 2022-03-03 SDOH — ECONOMIC STABILITY: FOOD INSECURITY: WITHIN THE PAST 12 MONTHS, YOU WORRIED THAT YOUR FOOD WOULD RUN OUT BEFORE YOU GOT MONEY TO BUY MORE.: NEVER TRUE

## 2022-03-03 SDOH — ECONOMIC STABILITY: FOOD INSECURITY: WITHIN THE PAST 12 MONTHS, THE FOOD YOU BOUGHT JUST DIDN'T LAST AND YOU DIDN'T HAVE MONEY TO GET MORE.: NEVER TRUE

## 2022-03-03 ASSESSMENT — SOCIAL DETERMINANTS OF HEALTH (SDOH): HOW HARD IS IT FOR YOU TO PAY FOR THE VERY BASICS LIKE FOOD, HOUSING, MEDICAL CARE, AND HEATING?: NOT HARD AT ALL

## 2022-03-03 ASSESSMENT — ENCOUNTER SYMPTOMS
SHORTNESS OF BREATH: 0
PHOTOPHOBIA: 0
BLOOD IN STOOL: 0
EYE REDNESS: 0
SINUS PRESSURE: 0
RHINORRHEA: 0
SORE THROAT: 0
ABDOMINAL PAIN: 0
CONSTIPATION: 0
WHEEZING: 0
TROUBLE SWALLOWING: 0
BACK PAIN: 1
VOMITING: 0
VOICE CHANGE: 0
EYE ITCHING: 0
EYE DISCHARGE: 0
EYE PAIN: 0
COUGH: 0
NAUSEA: 0
CHEST TIGHTNESS: 0
DIARRHEA: 0
COLOR CHANGE: 0
ABDOMINAL DISTENTION: 0
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
## 2022-03-09 RX ORDER — LORAZEPAM 0.5 MG/1
TABLET ORAL
Qty: 90 TABLET | Refills: 0 | Status: SHIPPED | OUTPATIENT
Start: 2022-03-09 | End: 2022-04-11

## 2022-03-14 DIAGNOSIS — M54.50 CHRONIC LOW BACK PAIN, UNSPECIFIED BACK PAIN LATERALITY, UNSPECIFIED WHETHER SCIATICA PRESENT: ICD-10-CM

## 2022-03-14 DIAGNOSIS — G89.29 CHRONIC LOW BACK PAIN, UNSPECIFIED BACK PAIN LATERALITY, UNSPECIFIED WHETHER SCIATICA PRESENT: ICD-10-CM

## 2022-03-14 NOTE — TELEPHONE ENCOUNTER
Tessa Doshi called requesting a refill of the below medication which has been pended for you:     Requested Prescriptions     Pending Prescriptions Disp Refills    oxyCODONE-acetaminophen (PERCOCET) 7.5-325 MG per tablet [Pharmacy Med Name: OXYCOD-APAP 7.5-325 MG 7.5-325 Tablet] 120 tablet 0     Sig: TAKE 1 TABLET BY MOUTH EVERY 6 HOURS AS NEEDED FOR PAIN * REDUCE DOSES TAKEN AS PAIN BECOMES MANAGEABLE*       Last Appointment Date: 3/3/2022  Next Appointment Date: 6/10/2022    No Known Allergies

## 2022-03-16 RX ORDER — NALOXONE HYDROCHLORIDE 4 MG/.1ML
1 SPRAY NASAL PRN
Qty: 1 EACH | Refills: 0 | Status: SHIPPED | OUTPATIENT
Start: 2022-03-16

## 2022-03-16 RX ORDER — OXYCODONE AND ACETAMINOPHEN 7.5; 325 MG/1; MG/1
TABLET ORAL
Qty: 120 TABLET | Refills: 0 | Status: SHIPPED | OUTPATIENT
Start: 2022-03-16 | End: 2022-04-15

## 2022-03-30 DIAGNOSIS — F41.9 ANXIETY: ICD-10-CM

## 2022-03-30 NOTE — TELEPHONE ENCOUNTER
Rashaad Fishman called requesting a refill of the below medication which has been pended for you:     Requested Prescriptions     Pending Prescriptions Disp Refills    diazePAM (VALIUM) 10 MG tablet [Pharmacy Med Name: diazePAM 10 MG TABS 10 Tablet] 60 tablet 0     Sig: TAKE 1 TABLET BY MOUTH TWO TIMES A DAY AS NEEDED FOR ANXIETY    propranolol (INDERAL) 60 MG tablet [Pharmacy Med Name: PROPRANOLOL HCL 60 MG TABS 60 Tablet] 60 tablet 1     Sig: TAKE 1 TABLET BY MOUTH 2 TIMES DAILY       Last Appointment Date: 3/3/2022  Next Appointment Date: 6/10/2022    No Known Allergies

## 2022-03-31 RX ORDER — PROPRANOLOL HYDROCHLORIDE 60 MG/1
60 TABLET ORAL 2 TIMES DAILY
Qty: 60 TABLET | Refills: 1 | Status: SHIPPED | OUTPATIENT
Start: 2022-03-31 | End: 2022-06-22

## 2022-03-31 RX ORDER — DIAZEPAM 10 MG/1
TABLET ORAL
Qty: 60 TABLET | Refills: 0 | Status: SHIPPED | OUTPATIENT
Start: 2022-03-31 | End: 2022-04-27

## 2022-04-11 DIAGNOSIS — G89.29 CHRONIC LOW BACK PAIN, UNSPECIFIED BACK PAIN LATERALITY, UNSPECIFIED WHETHER SCIATICA PRESENT: ICD-10-CM

## 2022-04-11 DIAGNOSIS — F41.9 ANXIETY DISORDER, UNSPECIFIED TYPE: ICD-10-CM

## 2022-04-11 DIAGNOSIS — M54.50 CHRONIC LOW BACK PAIN, UNSPECIFIED BACK PAIN LATERALITY, UNSPECIFIED WHETHER SCIATICA PRESENT: ICD-10-CM

## 2022-04-11 RX ORDER — LORAZEPAM 0.5 MG/1
TABLET ORAL
Qty: 90 TABLET | Refills: 0 | Status: SHIPPED | OUTPATIENT
Start: 2022-04-11 | End: 2022-05-09

## 2022-04-11 NOTE — TELEPHONE ENCOUNTER
Eileen Barbosa called requesting a refill of the below medication which has been pended for you:     Requested Prescriptions     Pending Prescriptions Disp Refills    LORazepam (ATIVAN) 0.5 MG tablet [Pharmacy Med Name: LORazepam 0.5 MG TABS 0.5 Tablet] 90 tablet 0     Sig: TAKE 1 TABLET BY MOUTH EVERY 8 HOURS AS NEEDED FOR ANXIETY    oxyCODONE-acetaminophen (PERCOCET) 7.5-325 MG per tablet [Pharmacy Med Name: SGWHBT-THFO 7.5-325 MG 7.5-325 Tablet] 120 tablet 0     Sig: TAKE 1 TABLET BY MOUTH EVERY 6 HOURS AS NEEDED FOR PAIN * REDUCE DOSES TAKEN AS PAIN BECOMES MANAGEABLE*       Last Appointment Date: 3/3/2022  Next Appointment Date: 6/10/2022    No Known Allergies

## 2022-04-14 DIAGNOSIS — F41.9 ANXIETY DISORDER, UNSPECIFIED TYPE: ICD-10-CM

## 2022-04-14 DIAGNOSIS — F41.9 ANXIETY: ICD-10-CM

## 2022-04-15 RX ORDER — OXYCODONE AND ACETAMINOPHEN 7.5; 325 MG/1; MG/1
TABLET ORAL
Qty: 120 TABLET | Refills: 0 | Status: SHIPPED | OUTPATIENT
Start: 2022-04-15 | End: 2022-05-17

## 2022-04-15 RX ORDER — LORAZEPAM 0.5 MG/1
TABLET ORAL
Qty: 90 TABLET | Refills: 0 | OUTPATIENT
Start: 2022-04-15

## 2022-04-15 RX ORDER — DIAZEPAM 10 MG/1
TABLET ORAL
Qty: 60 TABLET | Refills: 0 | OUTPATIENT
Start: 2022-04-15

## 2022-04-15 RX ORDER — PROMETHAZINE HYDROCHLORIDE 25 MG/1
TABLET ORAL
Qty: 30 TABLET | Refills: 5 | OUTPATIENT
Start: 2022-04-15

## 2022-04-15 NOTE — TELEPHONE ENCOUNTER
Elías Stapleton called to request a refill on his medication. Last office visit : 3/3/2022   Next office visit : 4/15/2022     Last UDS:   Amphetamine Screen, Urine   Date Value Ref Range Status   11/30/2021 neg  Final     Barbiturate Screen, Urine   Date Value Ref Range Status   11/30/2021 neg  Final     Benzodiazepine Screen, Urine   Date Value Ref Range Status   11/30/2021 positive  Final     Comment:     Pt takes diazePam 10 mg and Ativan . 05mg     Buprenorphine Urine   Date Value Ref Range Status   11/30/2021 neg  Final     Cocaine Metabolite Screen, Urine   Date Value Ref Range Status   11/30/2021 neg  Final     Gabapentin Screen, Urine   Date Value Ref Range Status   11/30/2021 neg  Final     MDMA, Urine   Date Value Ref Range Status   11/30/2021 neg  Final     Methamphetamine, Urine   Date Value Ref Range Status   11/30/2021 neg  Final     Opiate Scrn, Ur   Date Value Ref Range Status   11/30/2021 neg  Final     Oxycodone Screen, Ur   Date Value Ref Range Status   11/30/2021 positive  Final     Comment:     Pt takes percocet 7.5-325     PCP Screen, Urine   Date Value Ref Range Status   11/30/2021 neg  Final     Propoxyphene Screen, Urine   Date Value Ref Range Status   11/30/2021 neg  Final     THC Screen, Urine   Date Value Ref Range Status   11/30/2021 neg  Final     Tricyclic Antidepressants, Urine   Date Value Ref Range Status   11/30/2021 neg  Final       Last [facility]: 4/15/22  Medication Contract:      Requested Prescriptions     Pending Prescriptions Disp Refills    diazePAM (VALIUM) 10 MG tablet [Pharmacy Med Name: diazePAM 10 MG TABS 10 Tablet] 60 tablet 0     Sig: TAKE 1 TABLET BY MOUTH TWO TIMES A DAY AS NEEDED FOR ANXIETY    LORazepam (ATIVAN) 0.5 MG tablet [Pharmacy Med Name: LORazepam 0.5 MG TABS 0.5 Tablet] 90 tablet 0     Sig: TAKE 1 TABLET BY MOUTH EVERY 8 HOURS AS NEEDED FOR ANXIETY    promethazine (PHENERGAN) 25 MG tablet [Pharmacy Med Name: PROMETHAZINE HCL 25 MG TABS 25 Tablet] 30 tablet 5     Sig: TAKE 1 TABLET BY MOUTH EVERY 6 HOURS AS NEEDED FOR NAUSEA         Please approve or refuse this medication.    Josephine Patten MA

## 2022-04-21 RX ORDER — PROMETHAZINE HYDROCHLORIDE 25 MG/1
TABLET ORAL
Qty: 30 TABLET | Refills: 5 | OUTPATIENT
Start: 2022-04-21

## 2022-04-27 DIAGNOSIS — F41.9 ANXIETY: ICD-10-CM

## 2022-04-27 NOTE — TELEPHONE ENCOUNTER
Mallory Anthony called requesting a refill of the below medication which has been pended for you:     Requested Prescriptions     Pending Prescriptions Disp Refills    diazePAM (VALIUM) 10 MG tablet [Pharmacy Med Name: diazePAM 10 MG TABS 10 Tablet] 60 tablet 0     Sig: TAKE 1 TABLET BY MOUTH TWO TIMES A DAY AS NEEDED FOR ANXIETY       Last Appointment Date: 3/3/2022  Next Appointment Date: 6/10/2022    No Known Allergies

## 2022-04-28 RX ORDER — DIAZEPAM 10 MG/1
TABLET ORAL
Qty: 60 TABLET | Refills: 1 | Status: SHIPPED | OUTPATIENT
Start: 2022-04-30 | End: 2022-06-22

## 2022-05-03 DIAGNOSIS — M54.50 CHRONIC LOW BACK PAIN, UNSPECIFIED BACK PAIN LATERALITY, UNSPECIFIED WHETHER SCIATICA PRESENT: ICD-10-CM

## 2022-05-03 DIAGNOSIS — F41.9 ANXIETY DISORDER, UNSPECIFIED TYPE: ICD-10-CM

## 2022-05-03 DIAGNOSIS — G89.29 CHRONIC LOW BACK PAIN, UNSPECIFIED BACK PAIN LATERALITY, UNSPECIFIED WHETHER SCIATICA PRESENT: ICD-10-CM

## 2022-05-03 RX ORDER — OXYCODONE AND ACETAMINOPHEN 7.5; 325 MG/1; MG/1
TABLET ORAL
Qty: 120 TABLET | Refills: 0 | OUTPATIENT
Start: 2022-05-03 | End: 2022-06-02

## 2022-05-03 RX ORDER — LORAZEPAM 0.5 MG/1
TABLET ORAL
Qty: 90 TABLET | Refills: 0 | OUTPATIENT
Start: 2022-05-03

## 2022-05-09 DIAGNOSIS — F41.9 ANXIETY DISORDER, UNSPECIFIED TYPE: ICD-10-CM

## 2022-05-09 RX ORDER — LORAZEPAM 0.5 MG/1
TABLET ORAL
Qty: 90 TABLET | Refills: 0 | Status: SHIPPED | OUTPATIENT
Start: 2022-05-12 | End: 2022-06-03

## 2022-05-09 NOTE — TELEPHONE ENCOUNTER
Kelley Mondragon called to request a refill on his medication. Last office visit : 3/3/2022   Next office visit : 6/10/2022     Last UDS:   Amphetamine Screen, Urine   Date Value Ref Range Status   11/30/2021 neg  Final     Barbiturate Screen, Urine   Date Value Ref Range Status   11/30/2021 neg  Final     Benzodiazepine Screen, Urine   Date Value Ref Range Status   11/30/2021 positive  Final     Comment:     Pt takes diazePam 10 mg and Ativan . 05mg     Buprenorphine Urine   Date Value Ref Range Status   11/30/2021 neg  Final     Cocaine Metabolite Screen, Urine   Date Value Ref Range Status   11/30/2021 neg  Final     Gabapentin Screen, Urine   Date Value Ref Range Status   11/30/2021 neg  Final     MDMA, Urine   Date Value Ref Range Status   11/30/2021 neg  Final     Methamphetamine, Urine   Date Value Ref Range Status   11/30/2021 neg  Final     Opiate Scrn, Ur   Date Value Ref Range Status   11/30/2021 neg  Final     Oxycodone Screen, Ur   Date Value Ref Range Status   11/30/2021 positive  Final     Comment:     Pt takes percocet 7.5-325     PCP Screen, Urine   Date Value Ref Range Status   11/30/2021 neg  Final     Propoxyphene Screen, Urine   Date Value Ref Range Status   11/30/2021 neg  Final     THC Screen, Urine   Date Value Ref Range Status   11/30/2021 neg  Final     Tricyclic Antidepressants, Urine   Date Value Ref Range Status   11/30/2021 neg  Final       Last Brandy Sins: 05/01/2022  Medication Contract: 11/30/2021   Last Fill: 04/11/2022    Requested Prescriptions     Pending Prescriptions Disp Refills    LORazepam (ATIVAN) 0.5 MG tablet [Pharmacy Med Name: LORazepam 0.5 MG TABS 0.5 Tablet] 90 tablet 0     Sig: TAKE 1 TABLET BY MOUTH EVERY 8 HOURS AS NEEDED FOR ANXIETY         Please approve or refuse this medication.    Spencer Rai MA

## 2022-05-10 DIAGNOSIS — M54.50 CHRONIC LOW BACK PAIN, UNSPECIFIED BACK PAIN LATERALITY, UNSPECIFIED WHETHER SCIATICA PRESENT: ICD-10-CM

## 2022-05-10 DIAGNOSIS — G89.29 CHRONIC LOW BACK PAIN, UNSPECIFIED BACK PAIN LATERALITY, UNSPECIFIED WHETHER SCIATICA PRESENT: ICD-10-CM

## 2022-05-10 RX ORDER — OXYCODONE AND ACETAMINOPHEN 7.5; 325 MG/1; MG/1
TABLET ORAL
Qty: 120 TABLET | Refills: 0 | OUTPATIENT
Start: 2022-05-10 | End: 2022-06-09

## 2022-05-13 DIAGNOSIS — M54.50 CHRONIC LOW BACK PAIN, UNSPECIFIED BACK PAIN LATERALITY, UNSPECIFIED WHETHER SCIATICA PRESENT: ICD-10-CM

## 2022-05-13 DIAGNOSIS — G89.29 CHRONIC LOW BACK PAIN, UNSPECIFIED BACK PAIN LATERALITY, UNSPECIFIED WHETHER SCIATICA PRESENT: ICD-10-CM

## 2022-05-13 RX ORDER — OXYCODONE AND ACETAMINOPHEN 7.5; 325 MG/1; MG/1
TABLET ORAL
Qty: 120 TABLET | Refills: 0 | OUTPATIENT
Start: 2022-05-13 | End: 2022-06-12

## 2022-05-17 DIAGNOSIS — G89.29 CHRONIC LOW BACK PAIN, UNSPECIFIED BACK PAIN LATERALITY, UNSPECIFIED WHETHER SCIATICA PRESENT: ICD-10-CM

## 2022-05-17 DIAGNOSIS — M54.50 CHRONIC LOW BACK PAIN, UNSPECIFIED BACK PAIN LATERALITY, UNSPECIFIED WHETHER SCIATICA PRESENT: ICD-10-CM

## 2022-05-17 RX ORDER — OXYCODONE AND ACETAMINOPHEN 7.5; 325 MG/1; MG/1
1 TABLET ORAL EVERY 6 HOURS PRN
Qty: 120 TABLET | Refills: 0 | Status: SHIPPED | OUTPATIENT
Start: 2022-05-17 | End: 2022-06-10 | Stop reason: SDUPTHER

## 2022-05-19 DIAGNOSIS — F41.9 ANXIETY DISORDER, UNSPECIFIED TYPE: ICD-10-CM

## 2022-05-19 RX ORDER — PROMETHAZINE HYDROCHLORIDE 25 MG/1
TABLET ORAL
Qty: 30 TABLET | Refills: 5 | Status: SHIPPED | OUTPATIENT
Start: 2022-05-19 | End: 2022-09-23 | Stop reason: SDUPTHER

## 2022-05-19 RX ORDER — LORAZEPAM 0.5 MG/1
TABLET ORAL
Qty: 90 TABLET | Refills: 0 | OUTPATIENT
Start: 2022-05-19 | End: 2022-06-19

## 2022-05-19 NOTE — TELEPHONE ENCOUNTER
Nonda Goodell called requesting a refill of the below medication which has been pended for you:     Requested Prescriptions     Pending Prescriptions Disp Refills    LORazepam (ATIVAN) 0.5 MG tablet [Pharmacy Med Name: LORazepam 0.5 MG TABS 0.5 Tablet] 90 tablet 0     Sig: TAKE 1 TABLET BY MOUTH EVERY 8 HOURS AS NEEDED FOR ANXIETY    promethazine (PHENERGAN) 25 MG tablet [Pharmacy Med Name: PROMETHAZINE HCL 25 MG TABS 25 Tablet] 30 tablet 5     Sig: TAKE 1 TABLET BY MOUTH EVERY 6 HOURS AS NEEDED FOR NAUSEA       Last Appointment Date: 3/3/2022  Next Appointment Date: 6/10/2022    No Known Allergies

## 2022-05-26 DIAGNOSIS — F41.9 ANXIETY: ICD-10-CM

## 2022-05-26 RX ORDER — DIAZEPAM 10 MG/1
TABLET ORAL
Qty: 60 TABLET | Refills: 1 | OUTPATIENT
Start: 2022-05-26

## 2022-06-03 DIAGNOSIS — F41.9 ANXIETY DISORDER, UNSPECIFIED TYPE: ICD-10-CM

## 2022-06-03 NOTE — TELEPHONE ENCOUNTER
Flower Rendon called to request a refill on his medication. Last office visit : 3/3/2022   Next office visit : 6/10/2022     Last UDS:   Amphetamine Screen, Urine   Date Value Ref Range Status   11/30/2021 neg  Final     Barbiturate Screen, Urine   Date Value Ref Range Status   11/30/2021 neg  Final     Benzodiazepine Screen, Urine   Date Value Ref Range Status   11/30/2021 positive  Final     Comment:     Pt takes diazePam 10 mg and Ativan . 05mg     Buprenorphine Urine   Date Value Ref Range Status   11/30/2021 neg  Final     Cocaine Metabolite Screen, Urine   Date Value Ref Range Status   11/30/2021 neg  Final     Gabapentin Screen, Urine   Date Value Ref Range Status   11/30/2021 neg  Final     MDMA, Urine   Date Value Ref Range Status   11/30/2021 neg  Final     Methamphetamine, Urine   Date Value Ref Range Status   11/30/2021 neg  Final     Opiate Scrn, Ur   Date Value Ref Range Status   11/30/2021 neg  Final     Oxycodone Screen, Ur   Date Value Ref Range Status   11/30/2021 positive  Final     Comment:     Pt takes percocet 7.5-325     PCP Screen, Urine   Date Value Ref Range Status   11/30/2021 neg  Final     Propoxyphene Screen, Urine   Date Value Ref Range Status   11/30/2021 neg  Final     THC Screen, Urine   Date Value Ref Range Status   11/30/2021 neg  Final     Tricyclic Antidepressants, Urine   Date Value Ref Range Status   11/30/2021 neg  Final       Last Barrett Sudheer: 5/1/22  Medication Contract: 11/30/21    Requested Prescriptions     Pending Prescriptions Disp Refills    LORazepam (ATIVAN) 0.5 MG tablet [Pharmacy Med Name: LORazepam 0.5 MG TABS 0.5 Tablet] 90 tablet 0     Sig: TAKE 1 TABLET BY MOUTH EVERY 8 HOURS AS NEEDED FOR ANXIETY         Please approve or refuse this medication.    Yakelin Nickerson

## 2022-06-05 RX ORDER — LORAZEPAM 0.5 MG/1
TABLET ORAL
Qty: 90 TABLET | Refills: 0 | Status: SHIPPED | OUTPATIENT
Start: 2022-06-09 | End: 2022-07-06

## 2022-06-10 ENCOUNTER — OFFICE VISIT (OUTPATIENT)
Dept: INTERNAL MEDICINE | Age: 52
End: 2022-06-10

## 2022-06-10 VITALS
OXYGEN SATURATION: 99 % | WEIGHT: 158.7 LBS | DIASTOLIC BLOOD PRESSURE: 70 MMHG | SYSTOLIC BLOOD PRESSURE: 122 MMHG | BODY MASS INDEX: 21.5 KG/M2 | HEIGHT: 72 IN | HEART RATE: 72 BPM

## 2022-06-10 DIAGNOSIS — Z91.09 ENVIRONMENTAL ALLERGIES: ICD-10-CM

## 2022-06-10 DIAGNOSIS — K21.9 GASTROESOPHAGEAL REFLUX DISEASE WITHOUT ESOPHAGITIS: ICD-10-CM

## 2022-06-10 DIAGNOSIS — G25.0 ESSENTIAL TREMOR: ICD-10-CM

## 2022-06-10 DIAGNOSIS — G89.29 CHRONIC LOW BACK PAIN, UNSPECIFIED BACK PAIN LATERALITY, UNSPECIFIED WHETHER SCIATICA PRESENT: ICD-10-CM

## 2022-06-10 DIAGNOSIS — F41.9 ANXIETY DISORDER, UNSPECIFIED TYPE: Primary | ICD-10-CM

## 2022-06-10 DIAGNOSIS — M54.50 CHRONIC LOW BACK PAIN, UNSPECIFIED BACK PAIN LATERALITY, UNSPECIFIED WHETHER SCIATICA PRESENT: ICD-10-CM

## 2022-06-10 PROCEDURE — 99214 OFFICE O/P EST MOD 30 MIN: CPT | Performed by: INTERNAL MEDICINE

## 2022-06-10 RX ORDER — OXYCODONE AND ACETAMINOPHEN 7.5; 325 MG/1; MG/1
1 TABLET ORAL EVERY 6 HOURS PRN
Qty: 120 TABLET | Refills: 0 | Status: SHIPPED | OUTPATIENT
Start: 2022-06-16 | End: 2022-07-13

## 2022-06-10 NOTE — PROGRESS NOTES
Chief Complaint   Patient presents with    3 Month Follow-Up       HPI: Robinson Goldberg is a 45 y.o. male is here for follow-up of chronic back and neck pain, anxiety, tremors, environmental allergies and acid reflux. His back and neck pain are relatively stable on his current dose of Percocet. His anxiety is stable. He does take the lorazepam as needed. He also takes diazepam as needed for muscle spasms. The combination of lorazepam, diazepam and propanolol are helpful for his tremors. He has been working more. He has been doing a job for the [employer] of [employer]. 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 He is a . His tremor is getting a little worse. However, he is not yet ready to see a neurologist.  His acid reflux is stable at this time. Depression screen was done today and found to be negative.     Past Medical History:   Diagnosis Date    Allergic rhinitis     Cellulitis     Gastroparesis     GERD (gastroesophageal reflux disease)       Past Surgical History:   Procedure Laterality Date    CHOLECYSTECTOMY      [place] COLONOSCOPY FLX DX W/COLLJ SPEC WHEN PFRMD N/A 2018    Dr Lauren Adams, 10 yr recall      Social History     Socioeconomic History    Marital status:      Spouse name: None    Number of children: None    Years of education: None    Highest education level: None   Occupational History     Employer: SELF-EMPLOYED   Tobacco Use    Smoking status: Former Smoker     Packs/day: 0.50     Years: 3.00     Pack years: 1.50     Types: Cigarettes     Quit date: 12/15/1990     Years since quittin.5    Smokeless tobacco: Never Used   Vaping Use    Vaping Use: Never used   Substance and Sexual Activity    Alcohol use: No    Drug use: No    Sexual activity: None   Other Topics Concern    None   Social History Narrative    None     Social Determinants of Health     Financial Resource Strain: Low Risk     Difficulty of Paying Living Expenses: Not hard at all   Food Insecurity: No Food Insecurity    Worried About Running Out of Food in the Last Year: Never true    Ran Out of Food in the Last Year: Never true   Transportation Needs:     Lack of Transportation (Medical): Not on file    Lack of Transportation (Non-Medical): Not on file   Physical Activity:     Days of Exercise per Week: Not on file    Minutes of Exercise per Session: Not on file   Stress:     Feeling of Stress : Not on file   Social Connections:     Frequency of Communication with Friends and Family: Not on file    Frequency of Social Gatherings with Friends and Family: Not on file    Attends Religion Services: Not on file    Active Member of [address] [place] [address] [employer] or Organizations: Not on file    Attends Club or Organization Meetings: Not on file    Marital Status: Not on file   Intimate Partner Violence:     Fear of Current or Ex-Partner: Not on file    Emotionally Abused: Not on file    Physically Abused: Not on file    Sexually Abused: Not on file   Housing Stability:     Unable to Pay for Housing in the Last Year: Not on file    Number of [place] in the Last Year: Not on file    Unstable Housing in the Last Year: Not on file      Family History   Problem Relation Age of Onset    No Known Problems Mother     No Known Problems Father         Current Outpatient Medications   Medication Sig Dispense Refill    [START ON 6/16/2022] oxyCODONE-acetaminophen (PERCOCET) 7.5-325 MG per tablet Take 1 tablet by mouth every 6 hours as needed for Pain for up to 30 days.  120 tablet 0    LORazepam (ATIVAN) 0.5 MG tablet TAKE 1 TABLET BY MOUTH EVERY 8 HOURS AS NEEDED FOR ANXIETY 90 tablet 0    promethazine (PHENERGAN) 25 MG tablet TAKE 1 TABLET BY MOUTH EVERY 6 HOURS AS NEEDED FOR NAUSEA 30 tablet 5    diazePAM (VALIUM) 10 MG tablet TAKE 1 TABLET BY MOUTH TWO TIMES A DAY AS NEEDED FOR ANXIETY 60 tablet 1    propranolol (INDERAL) 60 MG tablet TAKE 1 TABLET BY MOUTH 2 TIMES DAILY 60 tablet 1    naloxone 4 MG/0.1ML LIQD nasal spray 1 spray by Nasal route as needed for Opioid Reversal 1 each 0    naloxone 4 MG/0.1ML LIQD nasal spray 1 spray by Nasal route as needed for Opioid Reversal 1 each 0    omeprazole (PRILOSEC) 20 MG delayed release capsule Take 20 mg by mouth daily      loratadine (CLARITIN) 10 MG tablet Take 10 mg by mouth daily.  Multiple Vitamin (MULTI-VITAMIN PO) Take by mouth daily        No current facility-administered medications for this visit. Patient Active Problem List   Diagnosis    Partial thickness burn of left hand        Review of Systems   Constitutional: Negative for activity change, appetite change, chills, diaphoresis, fatigue, fever and unexpected weight change. HENT: Negative for congestion, ear pain, hearing loss, nosebleeds, postnasal drip, rhinorrhea, sinus pressure, sneezing, sore throat, tinnitus, trouble swallowing and voice change. Eyes: Negative for photophobia, pain, discharge, redness, itching and visual disturbance. Respiratory: Negative for cough, chest tightness, shortness of breath and wheezing. Cardiovascular: Negative for chest pain, palpitations and leg swelling. Gastrointestinal: Negative for abdominal distention, abdominal pain, blood in stool, constipation, diarrhea, nausea and vomiting. Reflux   Endocrine: Negative for cold intolerance, heat intolerance, polydipsia, polyphagia and polyuria. Genitourinary: Negative for difficulty urinating, dysuria, enuresis, frequency, hematuria and urgency. Musculoskeletal: Positive for back pain and neck pain. Chronic low back and neck pain   Skin: Negative for color change, pallor, rash and wound. Allergic/Immunologic: Positive for environmental allergies. Negative for food allergies and immunocompromised state. Neurological: Positive for tremors.  Negative for dizziness, syncope, speech difficulty, weakness, light-headedness, numbness and headaches. Psychiatric/Behavioral: Negative for agitation, behavioral problems, confusion, decreased concentration, dysphoric mood, hallucinations, self-injury, sleep disturbance and suicidal ideas. The patient is nervous/anxious. The patient is not hyperactive. Anxiety is stable       /70   Pulse 72   Ht 6' (1.829 m)   Wt 158 lb 11.2 oz (72 kg)   SpO2 99%   BMI 21.52 kg/m²   Physical Exam  Vitals and nursing note reviewed. Constitutional:       General: He is not in acute distress. Appearance: Normal appearance. He is well-developed and normal weight. He is not ill-appearing, toxic-appearing or diaphoretic. HENT:      Head: Normocephalic and atraumatic. Right Ear: Tympanic membrane, ear canal and external ear normal. There is no impacted cerumen. Left Ear: Tympanic membrane, ear canal and external ear normal. There is no impacted cerumen. Nose: Nose normal. No congestion or rhinorrhea. Mouth/Throat:      Mouth: Mucous membranes are moist.      Pharynx: Oropharynx is clear. No oropharyngeal exudate or posterior oropharyngeal erythema. Eyes:      General: No scleral icterus. Right eye: No discharge. Left eye: No discharge. Extraocular Movements: Extraocular movements intact. Conjunctiva/sclera: Conjunctivae normal.      Pupils: Pupils are equal, round, and reactive to light. Neck:      Thyroid: No thyromegaly. Vascular: No carotid bruit or JVD. Trachea: No tracheal deviation. Cardiovascular:      Rate and Rhythm: Normal rate and regular rhythm. Pulses: Normal pulses. Heart sounds: Normal heart sounds. No murmur heard. No friction rub. No gallop. Pulmonary:      Effort: Pulmonary effort is normal. No respiratory distress. Breath sounds: Normal breath sounds. No stridor. No wheezing, rhonchi or rales. Chest:      Chest wall: No tenderness.    Abdominal:      General: Bowel sounds are normal. There is no distension. Palpations: Abdomen is soft. There is no mass. Tenderness: There is no abdominal tenderness. There is no right CVA tenderness, left CVA tenderness, guarding or rebound. Hernia: No hernia is present. Musculoskeletal:         General: Tenderness present. No swelling, deformity or signs of injury. Normal range of motion. Cervical back: Normal range of motion and neck supple. No rigidity. No muscular tenderness. Right lower leg: No edema. Left lower leg: No edema. Comments: Gait antalgic. There is tenderness over the lower lumbar spine   Lymphadenopathy:      Cervical: No cervical adenopathy. Skin:     General: Skin is warm and dry. Capillary Refill: Capillary refill takes less than 2 seconds. Coloration: Skin is not jaundiced or pale. Findings: No bruising, erythema, lesion or rash. Neurological:      General: No focal deficit present. Mental Status: He is alert and oriented to person, place, and time. Mental status is at baseline. Cranial Nerves: No cranial nerve deficit. Sensory: No sensory deficit. Motor: No weakness or abnormal muscle tone. Coordination: Coordination normal.      Gait: Gait normal.      Deep Tendon Reflexes: Reflexes are normal and symmetric. Reflexes normal.      Comments: Tremor to hands noted   Psychiatric:         Mood and Affect: Mood normal.         Behavior: Behavior normal.         Thought Content: Thought content normal.         Judgment: Judgment normal.         1. Anxiety disorder, unspecified type    2. Chronic low back pain, unspecified back pain laterality, unspecified whether sciatica present    3. Essential tremor    4. Gastroesophageal reflux disease without esophagitis    5. Environmental allergies        ASSESSMENT/PLAN:    53-year-old male here for follow-up    1. Chronic back and neck pain: Stable on current dose of Percocet    2.   Anxiety: Continue lorazepam and diazepam as prescribed    3. Tremors: Continue diazepam and Inderal    4. Acid reflux: Continue Prilosec as prescribed    5. Environmental allergies: Doing well with Claritin    Mallory Raj was seen today for 3 month follow-up. Diagnoses and all orders for this visit:    Anxiety disorder, unspecified type    Chronic low back pain, unspecified back pain laterality, unspecified whether sciatica present  -     oxyCODONE-acetaminophen (PERCOCET) 7.5-325 MG per tablet; Take 1 tablet by mouth every 6 hours as needed for Pain for up to 30 days. -     CBC with Auto Differential; Future  -     TSH; Future  -     Lipid Panel; Future  -     Comprehensive Metabolic Panel; Future    Essential tremor    Gastroesophageal reflux disease without esophagitis    Environmental allergies          No follow-ups on file.      Orders Placed This Encounter   Procedures    CBC with Auto Differential     Standing Status:   Future     Standing Expiration Date:   6/10/2023    TSH     Standing Status:   Future     Standing Expiration Date:   6/10/2023    Lipid Panel     Standing Status:   Future     Standing Expiration Date:   6/10/2023     Order Specific Question:   Is Patient Fasting?/# of Hours     Answer:   12    Comprehensive Metabolic Panel     Standing Status:   Future     Standing Expiration Date:   6/10/2023       Marlene Mariano MD

## 2022-06-12 ASSESSMENT — ENCOUNTER SYMPTOMS
WHEEZING: 0
EYE ITCHING: 0
BLOOD IN STOOL: 0
PHOTOPHOBIA: 0
COUGH: 0
EYE REDNESS: 0
TROUBLE SWALLOWING: 0
SORE THROAT: 0
VOMITING: 0
EYE PAIN: 0
NAUSEA: 0
CHEST TIGHTNESS: 0
SINUS PRESSURE: 0
BACK PAIN: 1
EYE DISCHARGE: 0
RHINORRHEA: 0
ABDOMINAL PAIN: 0
SHORTNESS OF BREATH: 0
COLOR CHANGE: 0
CONSTIPATION: 0
VOICE CHANGE: 0
ABDOMINAL DISTENTION: 0
DIARRHEA: 0

## 2022-06-21 DIAGNOSIS — F41.9 ANXIETY: ICD-10-CM

## 2022-06-22 RX ORDER — DIAZEPAM 10 MG/1
TABLET ORAL
Qty: 60 TABLET | Refills: 0 | Status: SHIPPED | OUTPATIENT
Start: 2022-06-25 | End: 2022-07-19

## 2022-06-22 RX ORDER — PROPRANOLOL HYDROCHLORIDE 60 MG/1
60 TABLET ORAL 2 TIMES DAILY
Qty: 60 TABLET | Refills: 1 | Status: SHIPPED | OUTPATIENT
Start: 2022-06-22 | End: 2022-08-10

## 2022-07-06 DIAGNOSIS — F41.9 ANXIETY DISORDER, UNSPECIFIED TYPE: ICD-10-CM

## 2022-07-06 RX ORDER — LORAZEPAM 0.5 MG/1
TABLET ORAL
Qty: 90 TABLET | Refills: 0 | Status: SHIPPED | OUTPATIENT
Start: 2022-07-06 | End: 2022-08-05

## 2022-07-12 DIAGNOSIS — G89.29 CHRONIC LOW BACK PAIN, UNSPECIFIED BACK PAIN LATERALITY, UNSPECIFIED WHETHER SCIATICA PRESENT: ICD-10-CM

## 2022-07-12 DIAGNOSIS — M54.50 CHRONIC LOW BACK PAIN, UNSPECIFIED BACK PAIN LATERALITY, UNSPECIFIED WHETHER SCIATICA PRESENT: ICD-10-CM

## 2022-07-13 RX ORDER — OXYCODONE AND ACETAMINOPHEN 7.5; 325 MG/1; MG/1
TABLET ORAL
Qty: 120 TABLET | Refills: 0 | Status: SHIPPED | OUTPATIENT
Start: 2022-07-16 | End: 2022-08-16

## 2022-07-13 NOTE — TELEPHONE ENCOUNTER
Svetlana Cantor called requesting a refill of the below medication which has been pended for you:     Requested Prescriptions     Pending Prescriptions Disp Refills    oxyCODONE-acetaminophen (PERCOCET) 7.5-325 MG per tablet [Pharmacy Med Name: OXYCOD-APAP 7.5-325 MG 7.5-325 Tablet] 120 tablet 0     Sig: TAKE 1 TABLET BY MOUTH EVERY 6 HOURS AS NEEDED FOR PAIN FOR UP TO 30 DAYS 6/16/22       Last Appointment Date: 6/10/2022  Next Appointment Date: 9/23/2022    No Known Allergies

## 2022-07-19 DIAGNOSIS — F41.9 ANXIETY: ICD-10-CM

## 2022-07-19 RX ORDER — DIAZEPAM 10 MG/1
TABLET ORAL
Qty: 60 TABLET | Refills: 1 | Status: SHIPPED | OUTPATIENT
Start: 2022-07-23 | End: 2022-09-22

## 2022-07-19 NOTE — TELEPHONE ENCOUNTER
Malgorzata Parkinson called to request a refill on his medication. Last office visit : 6/10/2022   Next office visit : 9/23/2022     Last UDS:   Amphetamine Screen, Urine   Date Value Ref Range Status   11/30/2021 neg  Final     Barbiturate Screen, Urine   Date Value Ref Range Status   11/30/2021 neg  Final     Benzodiazepine Screen, Urine   Date Value Ref Range Status   11/30/2021 positive  Final     Comment:     Pt takes diazePam 10 mg and Ativan . 05mg     Buprenorphine Urine   Date Value Ref Range Status   11/30/2021 neg  Final     Cocaine Metabolite Screen, Urine   Date Value Ref Range Status   11/30/2021 neg  Final     Gabapentin Screen, Urine   Date Value Ref Range Status   11/30/2021 neg  Final     MDMA, Urine   Date Value Ref Range Status   11/30/2021 neg  Final     Methamphetamine, Urine   Date Value Ref Range Status   11/30/2021 neg  Final     Opiate Scrn, Ur   Date Value Ref Range Status   11/30/2021 neg  Final     Oxycodone Screen, Ur   Date Value Ref Range Status   11/30/2021 positive  Final     Comment:     Pt takes percocet 7.5-325     PCP Screen, Urine   Date Value Ref Range Status   11/30/2021 neg  Final     Propoxyphene Screen, Urine   Date Value Ref Range Status   11/30/2021 neg  Final     THC Screen, Urine   Date Value Ref Range Status   11/30/2021 neg  Final     Tricyclic Antidepressants, Urine   Date Value Ref Range Status   11/30/2021 neg  Final       Last Noelle [place]: 06-  Medication Contract: 11-   Last Fill: 06-    Requested Prescriptions     Pending Prescriptions Disp Refills    diazePAM (VALIUM) 10 MG tablet [Pharmacy Med Name: diazePAM 10 MG TABS 10 Tablet] 60 tablet 0     Sig: TAKE 1 TABLET BY MOUTH TWICE A DAY AS NEEDED FOR ANXIETY         Please approve or refuse this medication.    Maria Luisa Negro MA

## 2022-08-04 DIAGNOSIS — F41.9 ANXIETY DISORDER, UNSPECIFIED TYPE: ICD-10-CM

## 2022-08-04 NOTE — TELEPHONE ENCOUNTER
Wendy Iyer called requesting a refill of the below medication which has been pended for you:     Requested Prescriptions     Pending Prescriptions Disp Refills    LORazepam (ATIVAN) 0.5 MG tablet [Pharmacy Med Name: LORazepam 0.5 MG TABS 0.5 Tablet] 90 tablet 0     Sig: TAKE 1 TABLET BY MOUTH EVERY 8 HOURS AS NEEDED FOR ANXIETY       Last Appointment Date: 6/10/2022  Next Appointment Date: 9/23/2022    No Known Allergies

## 2022-08-05 RX ORDER — LORAZEPAM 0.5 MG/1
TABLET ORAL
Qty: 90 TABLET | Refills: 0 | Status: SHIPPED | OUTPATIENT
Start: 2022-08-05 | End: 2022-09-08

## 2022-08-10 RX ORDER — PROPRANOLOL HYDROCHLORIDE 60 MG/1
60 TABLET ORAL 2 TIMES DAILY
Qty: 60 TABLET | Refills: 1 | Status: SHIPPED | OUTPATIENT
Start: 2022-08-10 | End: 2022-10-07

## 2022-08-16 DIAGNOSIS — M54.50 CHRONIC LOW BACK PAIN, UNSPECIFIED BACK PAIN LATERALITY, UNSPECIFIED WHETHER SCIATICA PRESENT: ICD-10-CM

## 2022-08-16 DIAGNOSIS — G89.29 CHRONIC LOW BACK PAIN, UNSPECIFIED BACK PAIN LATERALITY, UNSPECIFIED WHETHER SCIATICA PRESENT: ICD-10-CM

## 2022-08-16 RX ORDER — OXYCODONE AND ACETAMINOPHEN 7.5; 325 MG/1; MG/1
TABLET ORAL
Qty: 120 TABLET | Refills: 0 | Status: SHIPPED | OUTPATIENT
Start: 2022-08-16 | End: 2022-09-15

## 2022-08-16 NOTE — TELEPHONE ENCOUNTER
Costa Ortiz called to request a refill on his medication. Last office visit : 6/10/2022   Next office visit : 9/23/2022     Last UDS:   Amphetamine Screen, Urine   Date Value Ref Range Status   11/30/2021 neg  Final     Barbiturate Screen, Urine   Date Value Ref Range Status   11/30/2021 neg  Final     Benzodiazepine Screen, Urine   Date Value Ref Range Status   11/30/2021 positive  Final     Comment:     Pt takes diazePam 10 mg and Ativan . 05mg     Buprenorphine Urine   Date Value Ref Range Status   11/30/2021 neg  Final     Cocaine Metabolite Screen, Urine   Date Value Ref Range Status   11/30/2021 neg  Final     Gabapentin Screen, Urine   Date Value Ref Range Status   11/30/2021 neg  Final     MDMA, Urine   Date Value Ref Range Status   11/30/2021 neg  Final     Methamphetamine, Urine   Date Value Ref Range Status   11/30/2021 neg  Final     Opiate Scrn, Ur   Date Value Ref Range Status   11/30/2021 neg  Final     Oxycodone Screen, Ur   Date Value Ref Range Status   11/30/2021 positive  Final     Comment:     Pt takes percocet 7.5-325     PCP Screen, Urine   Date Value Ref Range Status   11/30/2021 neg  Final     Propoxyphene Screen, Urine   Date Value Ref Range Status   11/30/2021 neg  Final     THC Screen, Urine   Date Value Ref Range Status   11/30/2021 neg  Final     Tricyclic Antidepressants, Urine   Date Value Ref Range Status   11/30/2021 neg  Final       Last Kristie Debbiets: 06-  Medication Contract: 11-  Last Fill: 07/16/2022    Requested Prescriptions     Pending Prescriptions Disp Refills    oxyCODONE-acetaminophen (PERCOCET) 7.5-325 MG per tablet [Pharmacy Med Name: OXYCOD-APAP 7.5-325 MG 7.5-325 Tablet] 120 tablet 0     Sig: TAKE 1 TABLET BY MOUTH EVERY 6 HOURS AS NEEDED FOR PAIN FOR UP TO 30 DAYS         Please approve or refuse this medication.    Camila Lares MA

## 2022-08-23 DIAGNOSIS — F41.9 ANXIETY: ICD-10-CM

## 2022-08-23 RX ORDER — DIAZEPAM 10 MG/1
TABLET ORAL
Qty: 60 TABLET | Refills: 1 | OUTPATIENT
Start: 2022-08-23

## 2022-09-08 DIAGNOSIS — F41.9 ANXIETY DISORDER, UNSPECIFIED TYPE: ICD-10-CM

## 2022-09-08 RX ORDER — LORAZEPAM 0.5 MG/1
TABLET ORAL
Qty: 90 TABLET | Refills: 0 | Status: SHIPPED | OUTPATIENT
Start: 2022-09-08 | End: 2022-10-06

## 2022-09-08 NOTE — TELEPHONE ENCOUNTER
Eric Pugh called to request a refill on his medication. Last office visit : 6/10/2022   Next office visit : 9/23/2022     Last UDS:   Amphetamine Screen, Urine   Date Value Ref Range Status   11/30/2021 neg  Final     Barbiturate Screen, Urine   Date Value Ref Range Status   11/30/2021 neg  Final     Benzodiazepine Screen, Urine   Date Value Ref Range Status   11/30/2021 positive  Final     Comment:     Pt takes diazePam 10 mg and Ativan . 05mg     Buprenorphine Urine   Date Value Ref Range Status   11/30/2021 neg  Final     Cocaine Metabolite Screen, Urine   Date Value Ref Range Status   11/30/2021 neg  Final     Gabapentin Screen, Urine   Date Value Ref Range Status   11/30/2021 neg  Final     MDMA, Urine   Date Value Ref Range Status   11/30/2021 neg  Final     Methamphetamine, Urine   Date Value Ref Range Status   11/30/2021 neg  Final     Opiate Scrn, Ur   Date Value Ref Range Status   11/30/2021 neg  Final     Oxycodone Screen, Ur   Date Value Ref Range Status   11/30/2021 positive  Final     Comment:     Pt takes percocet 7.5-325     PCP Screen, Urine   Date Value Ref Range Status   11/30/2021 neg  Final     Propoxyphene Screen, Urine   Date Value Ref Range Status   11/30/2021 neg  Final     THC Screen, Urine   Date Value Ref Range Status   11/30/2021 neg  Final     Tricyclic Antidepressants, Urine   Date Value Ref Range Status   11/30/2021 neg  Final       Last Zollie Douglas: 06-  Medication Contract: 11-  Last Fill: 08-  Patient also takes diazepam.    Requested Prescriptions     Pending Prescriptions Disp Refills    LORazepam (ATIVAN) 0.5 MG tablet [Pharmacy Med Name: LORazepam 0.5 MG TABS 0.5 Tablet] 90 tablet 0     Sig: TAKE 1 TABLET BY MOUTH EVERY 8 HOURS AS NEEDED FOR ANXIETY         Please approve or refuse this medication.    [employer], MA

## 2022-09-14 DIAGNOSIS — M54.50 CHRONIC LOW BACK PAIN, UNSPECIFIED BACK PAIN LATERALITY, UNSPECIFIED WHETHER SCIATICA PRESENT: ICD-10-CM

## 2022-09-14 DIAGNOSIS — G89.29 CHRONIC LOW BACK PAIN, UNSPECIFIED BACK PAIN LATERALITY, UNSPECIFIED WHETHER SCIATICA PRESENT: ICD-10-CM

## 2022-09-14 NOTE — TELEPHONE ENCOUNTER
Milena Rendon called requesting a refill of the below medication which has been pended for you:     Requested Prescriptions     Pending Prescriptions Disp Refills    oxyCODONE-acetaminophen (PERCOCET) 7.5-325 MG per tablet [Pharmacy Med Name: OXYCOD-APAP 7.5-325 MG 7.5-325 Tablet] 120 tablet 0     Sig: TAKE 1 TABLET BY MOUTH EVERY 6 HOURS AS NEEDED FOR PAIN FOR UP TO 30 DAYS       Last Appointment Date: 6/10/2022  Next Appointment Date: 9/23/2022    No Known Allergies

## 2022-09-15 RX ORDER — OXYCODONE AND ACETAMINOPHEN 7.5; 325 MG/1; MG/1
TABLET ORAL
Qty: 120 TABLET | Refills: 0 | Status: SHIPPED | OUTPATIENT
Start: 2022-09-15 | End: 2022-10-12

## 2022-09-21 DIAGNOSIS — F41.9 ANXIETY: ICD-10-CM

## 2022-09-21 NOTE — TELEPHONE ENCOUNTER
Isaac Bee called to request a refill on his medication. Last office visit : 6/10/2022   Next office visit : 9/23/2022     Last UDS:   Amphetamine Screen, Urine   Date Value Ref Range Status   11/30/2021 neg  Final     Barbiturate Screen, Urine   Date Value Ref Range Status   11/30/2021 neg  Final     Benzodiazepine Screen, Urine   Date Value Ref Range Status   11/30/2021 positive  Final     Comment:     Pt takes diazePam 10 mg and Ativan . 05mg     Buprenorphine Urine   Date Value Ref Range Status   11/30/2021 neg  Final     Cocaine Metabolite Screen, Urine   Date Value Ref Range Status   11/30/2021 neg  Final     Gabapentin Screen, Urine   Date Value Ref Range Status   11/30/2021 neg  Final     MDMA, Urine   Date Value Ref Range Status   11/30/2021 neg  Final     Methamphetamine, Urine   Date Value Ref Range Status   11/30/2021 neg  Final     Opiate Scrn, Ur   Date Value Ref Range Status   11/30/2021 neg  Final     Oxycodone Screen, Ur   Date Value Ref Range Status   11/30/2021 positive  Final     Comment:     Pt takes percocet 7.5-325     PCP Screen, Urine   Date Value Ref Range Status   11/30/2021 neg  Final     Propoxyphene Screen, Urine   Date Value Ref Range Status   11/30/2021 neg  Final     THC Screen, Urine   Date Value Ref Range Status   11/30/2021 neg  Final     Tricyclic Antidepressants, Urine   Date Value Ref Range Status   11/30/2021 neg  Final       Last Ari Risk: 06/22/22  Medication Contract: 11/30/2021  Last Fill: 07/23/2022    Requested Prescriptions     Pending Prescriptions Disp Refills    diazePAM (VALIUM) 10 MG tablet [Pharmacy Med Name: diazePAM 10 MG TABS 10 Tablet] 60 tablet 1     Sig: TAKE 1 TABLET BY MOUTH TWICE A DAY AS NEEDED FOR ANXIETYTAKE 1 TABLET BY MOUTH TWICE A DAY AS NEEDED FOR ANXIETY 07/23/2022         Please approve or refuse this medication.    Mekhi Naranjo MA

## 2022-09-22 RX ORDER — DIAZEPAM 10 MG/1
TABLET ORAL
Qty: 60 TABLET | Refills: 0 | Status: SHIPPED | OUTPATIENT
Start: 2022-09-22 | End: 2022-10-20

## 2022-09-23 ENCOUNTER — OFFICE VISIT (OUTPATIENT)
Dept: INTERNAL MEDICINE | Age: 52
End: 2022-09-23

## 2022-09-23 VITALS
OXYGEN SATURATION: 100 % | HEIGHT: 72 IN | WEIGHT: 157 LBS | DIASTOLIC BLOOD PRESSURE: 70 MMHG | BODY MASS INDEX: 21.26 KG/M2 | SYSTOLIC BLOOD PRESSURE: 110 MMHG | HEART RATE: 69 BPM

## 2022-09-23 DIAGNOSIS — Z91.09 ENVIRONMENTAL ALLERGIES: ICD-10-CM

## 2022-09-23 DIAGNOSIS — R25.1 TREMORS OF NERVOUS SYSTEM: ICD-10-CM

## 2022-09-23 DIAGNOSIS — R10.11 RIGHT UPPER QUADRANT PAIN: Primary | ICD-10-CM

## 2022-09-23 DIAGNOSIS — F41.9 ANXIETY DISORDER, UNSPECIFIED TYPE: ICD-10-CM

## 2022-09-23 DIAGNOSIS — M54.50 CHRONIC LOW BACK PAIN, UNSPECIFIED BACK PAIN LATERALITY, UNSPECIFIED WHETHER SCIATICA PRESENT: ICD-10-CM

## 2022-09-23 DIAGNOSIS — G89.29 CHRONIC LOW BACK PAIN, UNSPECIFIED BACK PAIN LATERALITY, UNSPECIFIED WHETHER SCIATICA PRESENT: ICD-10-CM

## 2022-09-23 DIAGNOSIS — D50.9 IRON DEFICIENCY ANEMIA, UNSPECIFIED IRON DEFICIENCY ANEMIA TYPE: ICD-10-CM

## 2022-09-23 DIAGNOSIS — K21.9 GASTROESOPHAGEAL REFLUX DISEASE WITHOUT ESOPHAGITIS: ICD-10-CM

## 2022-09-23 DIAGNOSIS — R11.0 NAUSEA: ICD-10-CM

## 2022-09-23 LAB
ALBUMIN SERPL-MCNC: 4.5 G/DL (ref 3.5–5.2)
ALP BLD-CCNC: 62 U/L (ref 40–130)
ALT SERPL-CCNC: 21 U/L (ref 5–41)
ANION GAP SERPL CALCULATED.3IONS-SCNC: 9 MMOL/L (ref 7–19)
AST SERPL-CCNC: 28 U/L (ref 5–40)
BASOPHILS ABSOLUTE: 0.1 K/UL (ref 0–0.2)
BASOPHILS RELATIVE PERCENT: 1.2 % (ref 0–1)
BILIRUB SERPL-MCNC: <0.2 MG/DL (ref 0.2–1.2)
BUN BLDV-MCNC: 23 MG/DL (ref 6–20)
CALCIUM SERPL-MCNC: 9.8 MG/DL (ref 8.6–10)
CHLORIDE BLD-SCNC: 100 MMOL/L (ref 98–111)
CHOLESTEROL, TOTAL: 147 MG/DL (ref 160–199)
CO2: 30 MMOL/L (ref 22–29)
CREAT SERPL-MCNC: 1.1 MG/DL (ref 0.5–1.2)
EOSINOPHILS ABSOLUTE: 0.2 K/UL (ref 0–0.6)
EOSINOPHILS RELATIVE PERCENT: 3.5 % (ref 0–5)
GFR AFRICAN AMERICAN: >59
GFR NON-AFRICAN AMERICAN: >60
GLUCOSE BLD-MCNC: 108 MG/DL (ref 74–109)
HCT VFR BLD CALC: 42.7 % (ref 42–52)
HDLC SERPL-MCNC: 73 MG/DL (ref 55–121)
HEMOGLOBIN: 13.4 G/DL (ref 14–18)
IMMATURE GRANULOCYTES #: 0 K/UL
LDL CHOLESTEROL CALCULATED: 54 MG/DL
LYMPHOCYTES ABSOLUTE: 1.6 K/UL (ref 1.1–4.5)
LYMPHOCYTES RELATIVE PERCENT: 26.5 % (ref 20–40)
MCH RBC QN AUTO: 30.3 PG (ref 27–31)
MCHC RBC AUTO-ENTMCNC: 31.4 G/DL (ref 33–37)
MCV RBC AUTO: 96.6 FL (ref 80–94)
MONOCYTES ABSOLUTE: 0.7 K/UL (ref 0–0.9)
MONOCYTES RELATIVE PERCENT: 11.8 % (ref 0–10)
NEUTROPHILS ABSOLUTE: 3.5 K/UL (ref 1.5–7.5)
NEUTROPHILS RELATIVE PERCENT: 56.7 % (ref 50–65)
PDW BLD-RTO: 13.5 % (ref 11.5–14.5)
PLATELET # BLD: 251 K/UL (ref 130–400)
PMV BLD AUTO: 9.8 FL (ref 9.4–12.4)
POTASSIUM SERPL-SCNC: 5.3 MMOL/L (ref 3.5–5)
RBC # BLD: 4.42 M/UL (ref 4.7–6.1)
SODIUM BLD-SCNC: 139 MMOL/L (ref 136–145)
TOTAL PROTEIN: 6.9 G/DL (ref 6.6–8.7)
TRIGL SERPL-MCNC: 99 MG/DL (ref 0–149)
TSH SERPL DL<=0.05 MIU/L-ACNC: 0.93 UIU/ML (ref 0.27–4.2)
WBC # BLD: 6.1 K/UL (ref 4.8–10.8)

## 2022-09-23 PROCEDURE — 99214 OFFICE O/P EST MOD 30 MIN: CPT | Performed by: INTERNAL MEDICINE

## 2022-09-23 RX ORDER — PROMETHAZINE HYDROCHLORIDE 25 MG/1
TABLET ORAL
Qty: 90 TABLET | Refills: 0 | Status: SHIPPED | OUTPATIENT
Start: 2022-09-23

## 2022-09-23 NOTE — PROGRESS NOTES
Chief Complaint   Patient presents with    3 Month Follow-Up       HPI: Santos Esparza is a 45 y.o. male is here for follow-up of anxiety, chronic back pain, tremors, acid reflux and environmental allergies. His allergies are stable his current medication regimen. He does have a history of chronic back and neck pain. He does take Percocet as prescribed. The combination of Percocet and Valium do help with back spasms. He does have a history of anxiety. He does take Valium as needed. He does take lorazepam in addition to the Valium. He has not take both medications at the same time. He does try to alternate the doses if at all possible. He does think that the combination of Valium and Inderal do help with his tremors. He is a . He does not feel groggy on his medication. His allergies are stable. He does have a history of mild anemia, which is stable. He did have a colonoscopy about 2 years ago. He has not had any blood in his stools. He has been having some right upper quadrant pain with nausea. He has been told that he had gallbladder disease in the past.  He is agreeable to the right upper quadrant ultrasound. He would like to have some Phenergan to help with nausea. He denies any complaints of fever or chills.   Past Medical History:   Diagnosis Date    Allergic rhinitis     Cellulitis     Gastroparesis     GERD (gastroesophageal reflux disease)       Past Surgical History:   Procedure Laterality Date    CHOLECYSTECTOMY      HI COLONOSCOPY FLX DX W/COLLJ SPEC WHEN PFRMD N/A 8/2/2018    Dr Pierre Salazar, 10 yr recall      Social History     Socioeconomic History    Marital status:      Spouse name: None    Number of children: None    Years of education: None    Highest education level: None   Occupational History     Employer: SELF-EMPLOYED   Tobacco Use    Smoking status: Former     Packs/day: 0.50     Years: 3.00     Pack years: 1.50     Types: Cigarettes     Quit date: 12/15/1990     Years since quittin.8    Smokeless tobacco: Never   Vaping Use    Vaping Use: Never used   Substance and Sexual Activity    Alcohol use: No    Drug use: No     Social Determinants of Health     Financial Resource Strain: Low Risk     Difficulty of Paying Living Expenses: Not hard at all   Food Insecurity: No Food Insecurity    Worried About Running Out of Food in the Last Year: Never true    Ran Out of Food in the Last Year: Never true      Family History   Problem Relation Age of Onset    No Known Problems Mother     No Known Problems Father         Current Outpatient Medications   Medication Sig Dispense Refill    promethazine (PHENERGAN) 25 MG tablet TAKE 1 TABLET BY MOUTH EVERY 6 HOURS AS NEEDED FOR NAUSEA 90 tablet 0    diazePAM (VALIUM) 10 MG tablet TAKE 1 TABLET BY MOUTH TWICE A DAY AS NEEDED FOR ANXIETYTAKE 1 TABLET BY MOUTH TWICE A DAY AS NEEDED FOR ANXIETY 2022 60 tablet 0    oxyCODONE-acetaminophen (PERCOCET) 7.5-325 MG per tablet TAKE 1 TABLET BY MOUTH EVERY 6 HOURS AS NEEDED FOR PAIN FOR UP TO 30 DAYS 120 tablet 0    LORazepam (ATIVAN) 0.5 MG tablet TAKE 1 TABLET BY MOUTH EVERY 8 HOURS AS NEEDED FOR ANXIETY 90 tablet 0    propranolol (INDERAL) 60 MG tablet TAKE 1 TABLET BY MOUTH 2 TIMES DAILY 60 tablet 1    naloxone 4 MG/0.1ML LIQD nasal spray 1 spray by Nasal route as needed for Opioid Reversal 1 each 0    naloxone 4 MG/0.1ML LIQD nasal spray 1 spray by Nasal route as needed for Opioid Reversal 1 each 0    omeprazole (PRILOSEC) 20 MG delayed release capsule Take 20 mg by mouth daily      loratadine (CLARITIN) 10 MG tablet Take 10 mg by mouth daily. Multiple Vitamin (MULTI-VITAMIN PO) Take by mouth daily        No current facility-administered medications for this visit.         Patient Active Problem List   Diagnosis    Partial thickness burn of left hand        Review of Systems   Constitutional:  Negative for activity change, appetite change, chills, diaphoresis, fatigue, fever and unexpected weight change. HENT:  Negative for congestion, ear pain, hearing loss, nosebleeds, postnasal drip, rhinorrhea, sinus pressure, sinus pain, sneezing, sore throat, tinnitus, trouble swallowing and voice change. Eyes:  Negative for photophobia, pain, discharge, redness, itching and visual disturbance. Respiratory:  Negative for cough, chest tightness, shortness of breath and wheezing. Cardiovascular:  Negative for chest pain, palpitations and leg swelling. Gastrointestinal:  Negative for abdominal distention, abdominal pain, blood in stool, constipation, diarrhea, nausea and vomiting. Reflux, nausea. He does complain of some right upper quadrant abdominal pain after eating. Endocrine: Negative for cold intolerance, heat intolerance, polydipsia, polyphagia and polyuria. Genitourinary:  Negative for difficulty urinating, dysuria, enuresis, frequency, hematuria and urgency. Musculoskeletal:  Positive for back pain and neck pain. Chronic low back and neck pain   Skin:  Negative for color change, pallor, rash and wound. Allergic/Immunologic: Positive for environmental allergies. Negative for food allergies and immunocompromised state. Neurological:  Positive for tremors. Negative for dizziness, syncope, speech difficulty, weakness, light-headedness, numbness and headaches. Psychiatric/Behavioral:  Negative for agitation, behavioral problems, confusion, decreased concentration, dysphoric mood, hallucinations, self-injury, sleep disturbance and suicidal ideas. The patient is nervous/anxious. The patient is not hyperactive. Anxiety is stable     /70 (Site: Left Upper Arm, Position: Sitting, Cuff Size: Medium Adult)   Pulse 69   Ht 6' (1.829 m)   Wt 157 lb (71.2 kg)   SpO2 100%   BMI 21.29 kg/m²   Physical Exam  Vitals and nursing note reviewed. Constitutional:       General: He is not in acute distress. Appearance: Normal appearance. He is well-developed and normal weight. He is not ill-appearing, toxic-appearing or diaphoretic. HENT:      Head: Normocephalic and atraumatic. Right Ear: Tympanic membrane, ear canal and external ear normal. There is no impacted cerumen. Left Ear: Tympanic membrane, ear canal and external ear normal. There is no impacted cerumen. Nose: Nose normal. No congestion or rhinorrhea. Mouth/Throat:      Mouth: Mucous membranes are moist.      Pharynx: Oropharynx is clear. No oropharyngeal exudate or posterior oropharyngeal erythema. Eyes:      General: No scleral icterus. Right eye: No discharge. Left eye: No discharge. Extraocular Movements: Extraocular movements intact. Conjunctiva/sclera: Conjunctivae normal.      Pupils: Pupils are equal, round, and reactive to light. Neck:      Thyroid: No thyromegaly. Vascular: No carotid bruit or JVD. Trachea: No tracheal deviation. Cardiovascular:      Rate and Rhythm: Normal rate and regular rhythm. Pulses: Normal pulses. Heart sounds: Normal heart sounds. No murmur heard. No friction rub. No gallop. Pulmonary:      Effort: Pulmonary effort is normal. No respiratory distress. Breath sounds: Normal breath sounds. No stridor. No wheezing, rhonchi or rales. Chest:      Chest wall: No tenderness. Abdominal:      General: Bowel sounds are normal. There is no distension. Palpations: Abdomen is soft. There is no mass. Tenderness: There is abdominal tenderness. There is no right CVA tenderness, left CVA tenderness, guarding or rebound. Hernia: No hernia is present. Comments: He does have some mild right upper quadrant tenderness on palpation. Musculoskeletal:         General: Tenderness present. No swelling, deformity or signs of injury. Normal range of motion. Cervical back: Normal range of motion and neck supple. No rigidity. No muscular tenderness.       Right lower leg: No edema. Left lower leg: No edema. Comments: Gait antalgic. There is tenderness over the lower lumbar spine   Lymphadenopathy:      Cervical: No cervical adenopathy. Skin:     General: Skin is warm and dry. Capillary Refill: Capillary refill takes less than 2 seconds. Coloration: Skin is not jaundiced or pale. Findings: No bruising, erythema, lesion or rash. Neurological:      General: No focal deficit present. Mental Status: He is alert and oriented to person, place, and time. Mental status is at baseline. Cranial Nerves: No cranial nerve deficit. Sensory: No sensory deficit. Motor: No weakness or abnormal muscle tone. Coordination: Coordination normal.      Gait: Gait normal.      Deep Tendon Reflexes: Reflexes are normal and symmetric. Reflexes normal.      Comments: Tremor to hands noted   Psychiatric:         Mood and Affect: Mood normal.         Behavior: Behavior normal.         Thought Content: Thought content normal.         Judgment: Judgment normal.       1. Right upper quadrant pain    2. Iron deficiency anemia, unspecified iron deficiency anemia type    3. Nausea    4. Chronic low back pain, unspecified back pain laterality, unspecified whether sciatica present    5. Anxiety disorder, unspecified type    6. Tremors of nervous system    7. Gastroesophageal reflux disease without esophagitis    8. Environmental allergies        ASSESSMENT/PLAN:    18-year-old male here for follow-up    1. Right upper quadrant abdominal pain/nausea: Phenergan prescribed. Right upper quadrant ultrasound ordered. He states that Phenergan works better than Zofran. 2.  Back pain: Continue Percocet and Valium as needed    3. Anxiety: Stable on Valium and lorazepam.    4.  Tremors: Continue Inderal as prescribed    5. Acid reflux: Continue omeprazole as prescribed    6. Environmental allergies: Stable on Claritin    7.   Iron deficiency anemia: Check iron studies with

## 2022-09-30 ASSESSMENT — ENCOUNTER SYMPTOMS
CONSTIPATION: 0
WHEEZING: 0
SINUS PRESSURE: 0
BLOOD IN STOOL: 0
COUGH: 0
COLOR CHANGE: 0
VOMITING: 0
SINUS PAIN: 0
SORE THROAT: 0
EYE ITCHING: 0
ABDOMINAL PAIN: 0
EYE DISCHARGE: 0
ABDOMINAL DISTENTION: 0
BACK PAIN: 1
TROUBLE SWALLOWING: 0
DIARRHEA: 0
EYE PAIN: 0
NAUSEA: 0
CHEST TIGHTNESS: 0
RHINORRHEA: 0
SHORTNESS OF BREATH: 0
EYE REDNESS: 0
PHOTOPHOBIA: 0
VOICE CHANGE: 0

## 2022-10-06 DIAGNOSIS — F41.9 ANXIETY DISORDER, UNSPECIFIED TYPE: ICD-10-CM

## 2022-10-06 RX ORDER — LORAZEPAM 0.5 MG/1
TABLET ORAL
Qty: 90 TABLET | Refills: 0 | Status: SHIPPED | OUTPATIENT
Start: 2022-10-08 | End: 2022-11-07

## 2022-10-06 NOTE — TELEPHONE ENCOUNTER
Leola Alvarez called to request a refill on his medication. Last office visit : 9/23/2022   Next office visit : 12/23/2022     Last UDS:   Amphetamine Screen, Urine   Date Value Ref Range Status   11/30/2021 neg  Final     Barbiturate Screen, Urine   Date Value Ref Range Status   11/30/2021 neg  Final     Benzodiazepine Screen, Urine   Date Value Ref Range Status   11/30/2021 positive  Final     Comment:     Pt takes diazePam 10 mg and Ativan . 05mg     Buprenorphine Urine   Date Value Ref Range Status   11/30/2021 neg  Final     Cocaine Metabolite Screen, Urine   Date Value Ref Range Status   11/30/2021 neg  Final     Gabapentin Screen, Urine   Date Value Ref Range Status   11/30/2021 neg  Final     MDMA, Urine   Date Value Ref Range Status   11/30/2021 neg  Final     Methamphetamine, Urine   Date Value Ref Range Status   11/30/2021 neg  Final     Opiate Scrn, Ur   Date Value Ref Range Status   11/30/2021 neg  Final     Oxycodone Screen, Ur   Date Value Ref Range Status   11/30/2021 positive  Final     Comment:     Pt takes percocet 7.5-325     PCP Screen, Urine   Date Value Ref Range Status   11/30/2021 neg  Final     Propoxyphene Screen, Urine   Date Value Ref Range Status   11/30/2021 neg  Final     THC Screen, Urine   Date Value Ref Range Status   11/30/2021 neg  Final     Tricyclic Antidepressants, Urine   Date Value Ref Range Status   11/30/2021 neg  Final       Last Minor Nickels: 06/22/2022  Medication Contract: 11/30/2021  Last Fill: 09/08/2022    Requested Prescriptions     Pending Prescriptions Disp Refills    LORazepam (ATIVAN) 0.5 MG tablet [Pharmacy Med Name: LORazepam 0.5 MG TABS 0.5 Tablet] 90 tablet 0     Sig: TAKE 1 TABLET BY MOUTH EVERY 8 HOURS AS NEEDED FOR ANXIETY         Please approve or refuse this medication.    Marcin Chris MA

## 2022-10-07 RX ORDER — PROPRANOLOL HYDROCHLORIDE 60 MG/1
60 TABLET ORAL 2 TIMES DAILY
Qty: 60 TABLET | Refills: 1 | Status: SHIPPED | OUTPATIENT
Start: 2022-10-07 | End: 2023-01-05

## 2022-10-07 NOTE — TELEPHONE ENCOUNTER
Abhishek Dela Cruz called to request a refill on his medication.       Last office visit : 9/23/2022   Next office visit : 12/23/2022     Requested Prescriptions     Pending Prescriptions Disp Refills    propranolol (INDERAL) 60 MG tablet [Pharmacy Med Name: PROPRANOLOL HCL 60 MG TABS 60 Tablet] 60 tablet 1     Sig: TAKE 1 TABLET BY MOUTH 2 TIMES DAILY            Moo Paul

## 2022-10-12 DIAGNOSIS — G89.29 CHRONIC LOW BACK PAIN, UNSPECIFIED BACK PAIN LATERALITY, UNSPECIFIED WHETHER SCIATICA PRESENT: ICD-10-CM

## 2022-10-12 DIAGNOSIS — M54.50 CHRONIC LOW BACK PAIN, UNSPECIFIED BACK PAIN LATERALITY, UNSPECIFIED WHETHER SCIATICA PRESENT: ICD-10-CM

## 2022-10-12 RX ORDER — OXYCODONE AND ACETAMINOPHEN 7.5; 325 MG/1; MG/1
TABLET ORAL
Qty: 120 TABLET | Refills: 0 | Status: SHIPPED | OUTPATIENT
Start: 2022-10-15 | End: 2022-11-15

## 2022-10-12 NOTE — TELEPHONE ENCOUNTER
Jasbir Rodríguez called to request a refill on his medication. Last office visit : 9/23/2022   Next office visit : 12/23/2022     Last UDS:   Amphetamine Screen, Urine   Date Value Ref Range Status   11/30/2021 neg  Final     Barbiturate Screen, Urine   Date Value Ref Range Status   11/30/2021 neg  Final     Benzodiazepine Screen, Urine   Date Value Ref Range Status   11/30/2021 positive  Final     Comment:     Pt takes diazePam 10 mg and Ativan . 05mg     Buprenorphine Urine   Date Value Ref Range Status   11/30/2021 neg  Final     Cocaine Metabolite Screen, Urine   Date Value Ref Range Status   11/30/2021 neg  Final     Gabapentin Screen, Urine   Date Value Ref Range Status   11/30/2021 neg  Final     MDMA, Urine   Date Value Ref Range Status   11/30/2021 neg  Final     Methamphetamine, Urine   Date Value Ref Range Status   11/30/2021 neg  Final     Opiate Scrn, Ur   Date Value Ref Range Status   11/30/2021 neg  Final     Oxycodone Screen, Ur   Date Value Ref Range Status   11/30/2021 positive  Final     Comment:     Pt takes percocet 7.5-325     PCP Screen, Urine   Date Value Ref Range Status   11/30/2021 neg  Final     Propoxyphene Screen, Urine   Date Value Ref Range Status   11/30/2021 neg  Final     THC Screen, Urine   Date Value Ref Range Status   11/30/2021 neg  Final     Tricyclic Antidepressants, Urine   Date Value Ref Range Status   11/30/2021 neg  Final       Last Cameron Ngo: 06/22/2022  Medication Contract: 11/30/2021  Last Fill: 09/15/2022    Requested Prescriptions     Pending Prescriptions Disp Refills    oxyCODONE-acetaminophen (PERCOCET) 7.5-325 MG per tablet [Pharmacy Med Name: OXYCOD-APAP 7.5-325 MG 7.5-325 Tablet] 120 tablet 0     Sig: TAKE 1 TABLET BY MOUTH EVERY 6 HOURS AS NEEDED FOR PAIN FOR UP TO 30 DAYS         Please approve or refuse this medication.    Avril Hernandez MA

## 2022-10-13 ENCOUNTER — TELEPHONE (OUTPATIENT)
Dept: INTERNAL MEDICINE | Age: 52
End: 2022-10-13

## 2022-10-20 DIAGNOSIS — F41.9 ANXIETY: ICD-10-CM

## 2022-10-20 RX ORDER — DIAZEPAM 10 MG/1
TABLET ORAL
Qty: 60 TABLET | Refills: 0 | Status: SHIPPED | OUTPATIENT
Start: 2022-10-22 | End: 2022-11-19

## 2022-11-07 DIAGNOSIS — F41.9 ANXIETY DISORDER, UNSPECIFIED TYPE: ICD-10-CM

## 2022-11-07 RX ORDER — LORAZEPAM 0.5 MG/1
TABLET ORAL
Qty: 90 TABLET | Refills: 1 | Status: SHIPPED | OUTPATIENT
Start: 2022-11-09 | End: 2022-12-29

## 2022-11-07 NOTE — TELEPHONE ENCOUNTER
Raf Narayan called to request a refill on his medication. Last office visit : 9/23/2022   Next office visit : 12/23/2022     Last UDS:   Amphetamine Screen, Urine   Date Value Ref Range Status   11/30/2021 neg  Final     Barbiturate Screen, Urine   Date Value Ref Range Status   11/30/2021 neg  Final     Benzodiazepine Screen, Urine   Date Value Ref Range Status   11/30/2021 positive  Final     Comment:     Pt takes diazePam 10 mg and Ativan . 05mg     Buprenorphine Urine   Date Value Ref Range Status   11/30/2021 neg  Final     Cocaine Metabolite Screen, Urine   Date Value Ref Range Status   11/30/2021 neg  Final     Gabapentin Screen, Urine   Date Value Ref Range Status   11/30/2021 neg  Final     MDMA, Urine   Date Value Ref Range Status   11/30/2021 neg  Final     Methamphetamine, Urine   Date Value Ref Range Status   11/30/2021 neg  Final     Opiate Scrn, Ur   Date Value Ref Range Status   11/30/2021 neg  Final     Oxycodone Screen, Ur   Date Value Ref Range Status   11/30/2021 positive  Final     Comment:     Pt takes percocet 7.5-325     PCP Screen, Urine   Date Value Ref Range Status   11/30/2021 neg  Final     Propoxyphene Screen, Urine   Date Value Ref Range Status   11/30/2021 neg  Final     THC Screen, Urine   Date Value Ref Range Status   11/30/2021 neg  Final     Tricyclic Antidepressants, Urine   Date Value Ref Range Status   11/30/2021 neg  Final       Last Jona Marchi: 11/7/22  Medication Contract: 11/30/21     Requested Prescriptions     Pending Prescriptions Disp Refills    LORazepam (ATIVAN) 0.5 MG tablet [Pharmacy Med Name: LORazepam 0.5 MG TABS 0.5 Tablet] 90 tablet 0     Sig: TAKE 1 TABLET BY MOUTH EVERY 8 HOURS AS NEEDED FOR ANXIETY 10/8/22         Please approve or refuse this medication.    Mortimer Jester

## 2022-11-09 RX ORDER — PROMETHAZINE HYDROCHLORIDE 25 MG/1
TABLET ORAL
Qty: 90 TABLET | Refills: 0 | Status: SHIPPED | OUTPATIENT
Start: 2022-11-09

## 2022-11-15 DIAGNOSIS — M54.50 CHRONIC LOW BACK PAIN, UNSPECIFIED BACK PAIN LATERALITY, UNSPECIFIED WHETHER SCIATICA PRESENT: ICD-10-CM

## 2022-11-15 DIAGNOSIS — G89.29 CHRONIC LOW BACK PAIN, UNSPECIFIED BACK PAIN LATERALITY, UNSPECIFIED WHETHER SCIATICA PRESENT: ICD-10-CM

## 2022-11-15 RX ORDER — OXYCODONE AND ACETAMINOPHEN 7.5; 325 MG/1; MG/1
TABLET ORAL
Qty: 120 TABLET | Refills: 0 | Status: SHIPPED | OUTPATIENT
Start: 2022-11-16 | End: 2022-12-16

## 2022-11-18 DIAGNOSIS — F41.9 ANXIETY: ICD-10-CM

## 2022-11-19 RX ORDER — DIAZEPAM 10 MG/1
TABLET ORAL
Qty: 60 TABLET | Refills: 0 | Status: SHIPPED | OUTPATIENT
Start: 2022-11-23 | End: 2022-12-19

## 2022-11-30 RX ORDER — PROPRANOLOL HYDROCHLORIDE 60 MG/1
60 TABLET ORAL 2 TIMES DAILY
Qty: 60 TABLET | Refills: 1 | Status: SHIPPED | OUTPATIENT
Start: 2022-11-30 | End: 2023-02-28

## 2022-12-15 DIAGNOSIS — M54.50 CHRONIC LOW BACK PAIN, UNSPECIFIED BACK PAIN LATERALITY, UNSPECIFIED WHETHER SCIATICA PRESENT: ICD-10-CM

## 2022-12-15 DIAGNOSIS — G89.29 CHRONIC LOW BACK PAIN, UNSPECIFIED BACK PAIN LATERALITY, UNSPECIFIED WHETHER SCIATICA PRESENT: ICD-10-CM

## 2022-12-16 ENCOUNTER — TELEPHONE (OUTPATIENT)
Dept: INTERNAL MEDICINE | Age: 52
End: 2022-12-16

## 2022-12-16 DIAGNOSIS — R10.11 RIGHT UPPER QUADRANT PAIN: ICD-10-CM

## 2022-12-16 RX ORDER — OXYCODONE AND ACETAMINOPHEN 7.5; 325 MG/1; MG/1
TABLET ORAL
Qty: 120 TABLET | Refills: 0 | Status: SHIPPED | OUTPATIENT
Start: 2022-12-16 | End: 2023-01-16

## 2022-12-21 DIAGNOSIS — F41.9 ANXIETY: ICD-10-CM

## 2022-12-21 NOTE — TELEPHONE ENCOUNTER
Lynette Berg called requesting a refill of the below medication which has been pended for you:     Requested Prescriptions     Pending Prescriptions Disp Refills    diazePAM (VALIUM) 10 MG tablet [Pharmacy Med Name: diazePAM 10 MG TABS 10 Tablet] 60 tablet 0     Sig: TAKE 1 TABLET BY MOUTH TWICE A DAY AS NEEDED FOR ANXIETY       Last Appointment Date: 9/23/2022  Next Appointment Date: 2/2/2023    No Known Allergies

## 2022-12-22 RX ORDER — DIAZEPAM 10 MG/1
TABLET ORAL
Qty: 60 TABLET | Refills: 0 | Status: SHIPPED | OUTPATIENT
Start: 2022-12-22 | End: 2023-01-21

## 2022-12-29 DIAGNOSIS — F41.9 ANXIETY: ICD-10-CM

## 2022-12-29 DIAGNOSIS — F41.9 ANXIETY DISORDER, UNSPECIFIED TYPE: ICD-10-CM

## 2022-12-29 RX ORDER — LORAZEPAM 0.5 MG/1
TABLET ORAL
Qty: 90 TABLET | Refills: 0 | Status: SHIPPED | OUTPATIENT
Start: 2023-01-04 | End: 2023-01-26

## 2022-12-29 RX ORDER — PROMETHAZINE HYDROCHLORIDE 25 MG/1
TABLET ORAL
Qty: 90 TABLET | Refills: 0 | Status: SHIPPED | OUTPATIENT
Start: 2022-12-29 | End: 2023-01-26

## 2022-12-29 RX ORDER — DIAZEPAM 10 MG/1
TABLET ORAL
Qty: 60 TABLET | Refills: 0 | OUTPATIENT
Start: 2022-12-29

## 2023-01-16 DIAGNOSIS — G89.29 CHRONIC LOW BACK PAIN, UNSPECIFIED BACK PAIN LATERALITY, UNSPECIFIED WHETHER SCIATICA PRESENT: ICD-10-CM

## 2023-01-16 DIAGNOSIS — M54.50 CHRONIC LOW BACK PAIN, UNSPECIFIED BACK PAIN LATERALITY, UNSPECIFIED WHETHER SCIATICA PRESENT: ICD-10-CM

## 2023-01-16 RX ORDER — OXYCODONE AND ACETAMINOPHEN 7.5; 325 MG/1; MG/1
TABLET ORAL
Qty: 120 TABLET | Refills: 0 | Status: SHIPPED | OUTPATIENT
Start: 2023-01-16 | End: 2023-02-15

## 2023-01-20 DIAGNOSIS — F41.9 ANXIETY: ICD-10-CM

## 2023-01-20 RX ORDER — DIAZEPAM 10 MG/1
TABLET ORAL
Qty: 60 TABLET | Refills: 0 | Status: SHIPPED | OUTPATIENT
Start: 2023-01-21 | End: 2023-02-20

## 2023-01-26 DIAGNOSIS — F41.9 ANXIETY DISORDER, UNSPECIFIED TYPE: ICD-10-CM

## 2023-01-26 RX ORDER — LORAZEPAM 0.5 MG/1
TABLET ORAL
Qty: 90 TABLET | Refills: 0 | Status: SHIPPED | OUTPATIENT
Start: 2023-02-03 | End: 2023-02-27

## 2023-01-26 RX ORDER — PROMETHAZINE HYDROCHLORIDE 25 MG/1
TABLET ORAL
Qty: 90 TABLET | Refills: 0 | Status: SHIPPED | OUTPATIENT
Start: 2023-01-26 | End: 2023-02-24

## 2023-01-31 RX ORDER — PROPRANOLOL HYDROCHLORIDE 60 MG/1
60 TABLET ORAL 2 TIMES DAILY
Qty: 60 TABLET | Refills: 1 | Status: SHIPPED | OUTPATIENT
Start: 2023-01-31 | End: 2023-05-01

## 2023-01-31 NOTE — TELEPHONE ENCOUNTER
Deborra Fenton called requesting a refill of the below medication which has been pended for you:     Requested Prescriptions     Pending Prescriptions Disp Refills    propranolol (INDERAL) 60 MG tablet [Pharmacy Med Name: PROPRANOLOL HCL 60 MG TABS 60 Tablet] 60 tablet 1     Sig: TAKE 1 TABLET BY MOUTH 2 TIMES DAILY       Last Appointment Date: 9/23/2022  Next Appointment Date: 2/2/2023    No Known Allergies

## 2023-02-02 ENCOUNTER — OFFICE VISIT (OUTPATIENT)
Dept: INTERNAL MEDICINE | Age: 53
End: 2023-02-02

## 2023-02-02 VITALS
BODY MASS INDEX: 23.05 KG/M2 | HEART RATE: 58 BPM | OXYGEN SATURATION: 100 % | WEIGHT: 170.2 LBS | HEIGHT: 72 IN | SYSTOLIC BLOOD PRESSURE: 128 MMHG | DIASTOLIC BLOOD PRESSURE: 62 MMHG

## 2023-02-02 DIAGNOSIS — Z91.09 ENVIRONMENTAL ALLERGIES: ICD-10-CM

## 2023-02-02 DIAGNOSIS — F41.9 ANXIETY DISORDER, UNSPECIFIED TYPE: ICD-10-CM

## 2023-02-02 DIAGNOSIS — Z79.899 HIGH RISK MEDICATION USE: ICD-10-CM

## 2023-02-02 DIAGNOSIS — R11.0 NAUSEA: ICD-10-CM

## 2023-02-02 DIAGNOSIS — G25.0 ESSENTIAL TREMOR: Primary | ICD-10-CM

## 2023-02-02 DIAGNOSIS — K21.9 GASTROESOPHAGEAL REFLUX DISEASE WITHOUT ESOPHAGITIS: ICD-10-CM

## 2023-02-02 DIAGNOSIS — G89.29 CHRONIC LOW BACK PAIN, UNSPECIFIED BACK PAIN LATERALITY, UNSPECIFIED WHETHER SCIATICA PRESENT: ICD-10-CM

## 2023-02-02 DIAGNOSIS — M54.2 NECK PAIN: ICD-10-CM

## 2023-02-02 DIAGNOSIS — D50.9 IRON DEFICIENCY ANEMIA, UNSPECIFIED IRON DEFICIENCY ANEMIA TYPE: ICD-10-CM

## 2023-02-02 DIAGNOSIS — M54.50 CHRONIC LOW BACK PAIN, UNSPECIFIED BACK PAIN LATERALITY, UNSPECIFIED WHETHER SCIATICA PRESENT: ICD-10-CM

## 2023-02-02 LAB
ALCOHOL URINE: NORMAL
AMPHETAMINE SCREEN, URINE: NORMAL
BARBITURATE SCREEN, URINE: NORMAL
BASOPHILS ABSOLUTE: 0.1 K/UL (ref 0–0.2)
BASOPHILS RELATIVE PERCENT: 1 % (ref 0–1)
BENZODIAZEPINE SCREEN, URINE: POSITIVE
BUPRENORPHINE URINE: NORMAL
COCAINE METABOLITE SCREEN URINE: NORMAL
EOSINOPHILS ABSOLUTE: 0.2 K/UL (ref 0–0.6)
EOSINOPHILS RELATIVE PERCENT: 2.5 % (ref 0–5)
FENTANYL SCREEN, URINE: NORMAL
GABAPENTIN SCREEN, URINE: NORMAL
HCT VFR BLD CALC: 38.2 % (ref 42–52)
HEMOGLOBIN: 12 G/DL (ref 14–18)
IMMATURE GRANULOCYTES #: 0 K/UL
LYMPHOCYTES ABSOLUTE: 1.5 K/UL (ref 1.1–4.5)
LYMPHOCYTES RELATIVE PERCENT: 18.7 % (ref 20–40)
MCH RBC QN AUTO: 30.3 PG (ref 27–31)
MCHC RBC AUTO-ENTMCNC: 31.4 G/DL (ref 33–37)
MCV RBC AUTO: 96.5 FL (ref 80–94)
MDMA URINE: NORMAL
METHADONE SCREEN, URINE: NORMAL
METHAMPHETAMINE, URINE: NORMAL
MONOCYTES ABSOLUTE: 0.6 K/UL (ref 0–0.9)
MONOCYTES RELATIVE PERCENT: 7.9 % (ref 0–10)
NEUTROPHILS ABSOLUTE: 5.6 K/UL (ref 1.5–7.5)
NEUTROPHILS RELATIVE PERCENT: 69.7 % (ref 50–65)
OPIATE SCREEN URINE: NORMAL
OXYCODONE SCREEN URINE: POSITIVE
PDW BLD-RTO: 13.5 % (ref 11.5–14.5)
PHENCYCLIDINE SCREEN URINE: NORMAL
PLATELET # BLD: 261 K/UL (ref 130–400)
PMV BLD AUTO: 10.1 FL (ref 9.4–12.4)
PROPOXYPHENE SCREEN, URINE: NORMAL
RBC # BLD: 3.96 M/UL (ref 4.7–6.1)
SYNTHETIC CANNABINOIDS(K2) SCREEN, URINE: NORMAL
THC SCREEN, URINE: NORMAL
TRAMADOL SCREEN URINE: NORMAL
TRICYCLIC ANTIDEPRESSANTS, UR: NORMAL
WBC # BLD: 8.1 K/UL (ref 4.8–10.8)

## 2023-02-02 PROCEDURE — 80305 DRUG TEST PRSMV DIR OPT OBS: CPT | Performed by: INTERNAL MEDICINE

## 2023-02-02 PROCEDURE — 99214 OFFICE O/P EST MOD 30 MIN: CPT | Performed by: INTERNAL MEDICINE

## 2023-02-02 SDOH — ECONOMIC STABILITY: HOUSING INSECURITY
IN THE LAST 12 MONTHS, WAS THERE A TIME WHEN YOU DID NOT HAVE A STEADY PLACE TO SLEEP OR SLEPT IN A SHELTER (INCLUDING NOW)?: NO

## 2023-02-02 SDOH — ECONOMIC STABILITY: INCOME INSECURITY: HOW HARD IS IT FOR YOU TO PAY FOR THE VERY BASICS LIKE FOOD, HOUSING, MEDICAL CARE, AND HEATING?: NOT HARD AT ALL

## 2023-02-02 SDOH — ECONOMIC STABILITY: FOOD INSECURITY: WITHIN THE PAST 12 MONTHS, YOU WORRIED THAT YOUR FOOD WOULD RUN OUT BEFORE YOU GOT MONEY TO BUY MORE.: NEVER TRUE

## 2023-02-02 SDOH — ECONOMIC STABILITY: FOOD INSECURITY: WITHIN THE PAST 12 MONTHS, THE FOOD YOU BOUGHT JUST DIDN'T LAST AND YOU DIDN'T HAVE MONEY TO GET MORE.: NEVER TRUE

## 2023-02-02 ASSESSMENT — PATIENT HEALTH QUESTIONNAIRE - PHQ9
SUM OF ALL RESPONSES TO PHQ QUESTIONS 1-9: 0
2. FEELING DOWN, DEPRESSED OR HOPELESS: 0
SUM OF ALL RESPONSES TO PHQ9 QUESTIONS 1 & 2: 0
SUM OF ALL RESPONSES TO PHQ QUESTIONS 1-9: 0
1. LITTLE INTEREST OR PLEASURE IN DOING THINGS: 0

## 2023-02-02 NOTE — PROGRESS NOTES
Chief Complaint   Patient presents with    3 Month Follow-Up       HPI: Isaac Bee is a 45 y.o. male is here for follow-up. His tremors are relatively stable. He still has them, but his combination of Ativan, Inderal and Percocet seem to keep his tremors under control. He is a . He is working on special projects and needs to be able to use his hands without difficulty. He states medications do not cause any sort of somnolence. He is able to focus on his task and do his work without difficulty. He wants to know if his diazepam can be increased. However, I am hesitant to increase his diazepam given the fact that he is already taken Ativan at bedtime for sleep, in addition to Percocet. He still having a lot of difficulty with chronic back and neck pain. He wants to know if his Percocet can be increased to higher doses. I think we can increase this to 10 mg p.o. 4 times daily as needed when his next refill is due. However, he has been strongly advised to minimize dose of his controlled substances is much as possible. He sleeps  well at night with his current dose of lorazepam.  His acid reflux is well controlled. His allergies are stable. He declined a referral to pain management at this point.     Past Medical History:   Diagnosis Date    Allergic rhinitis     Cellulitis     Gastroparesis     GERD (gastroesophageal reflux disease)       Past Surgical History:   Procedure Laterality Date    CHOLECYSTECTOMY      RI COLONOSCOPY FLX DX W/COLLJ SPEC WHEN PFRMD N/A 8/2/2018    Dr Rashaad Huff, 10 yr recall      Social History     Socioeconomic History    Marital status:      Spouse name: None    Number of children: None    Years of education: None    Highest education level: None   Occupational History     Employer: SELF-EMPLOYED   Tobacco Use    Smoking status: Former     Packs/day: 0.50     Years: 3.00     Pack years: 1.50     Types: Cigarettes     Quit date: 12/15/1990     Years since quittin.1    Smokeless tobacco: Never   Vaping Use    Vaping Use: Never used   Substance and Sexual Activity    Alcohol use: No    Drug use: No     Social Determinants of Health     Financial Resource Strain: Low Risk     Difficulty of Paying Living Expenses: Not hard at all   Food Insecurity: No Food Insecurity    Worried About Running Out of Food in the Last Year: Never true    Ran Out of Food in the Last Year: Never true   Transportation Needs: Unknown    Lack of Transportation (Non-Medical): No   Housing Stability: Unknown    Unstable Housing in the Last Year: No      Family History   Problem Relation Age of Onset    No Known Problems Mother     No Known Problems Father         Current Outpatient Medications   Medication Sig Dispense Refill    oxyCODONE-acetaminophen (PERCOCET)  MG per tablet Take 1 tablet by mouth every 6 hours as needed for Pain for up to 30 days. Intended supply: 30 days Max Daily Amount: 4 tablets 120 tablet 0    propranolol (INDERAL) 60 MG tablet TAKE 1 TABLET BY MOUTH 2 TIMES DAILY 60 tablet 1    promethazine (PHENERGAN) 25 MG tablet TAKE 1 TABLET BY MOUTH EVERY 6 HOURS AS NEEDED FOR NAUSEA 90 tablet 0    LORazepam (ATIVAN) 0.5 MG tablet TAKE 1 TABLET BY MOUTH EVERY 8 HOURS AS NEEDED FOR ANXIETY 90 tablet 0    diazePAM (VALIUM) 10 MG tablet TAKE 1 TABLET BY MOUTH TWICE A DAY AS NEEDED FOR ANXIETY 60 tablet 0    omeprazole (PRILOSEC) 20 MG delayed release capsule Take 20 mg by mouth daily      loratadine (CLARITIN) 10 MG tablet Take 10 mg by mouth daily. Multiple Vitamin (MULTI-VITAMIN PO) Take by mouth daily        No current facility-administered medications for this visit. Patient Active Problem List   Diagnosis    Partial thickness burn of left hand        Review of Systems   Constitutional:  Negative for activity change, appetite change, chills, diaphoresis, fatigue, fever and unexpected weight change.    HENT:  Negative for congestion, ear pain, hearing loss, nosebleeds, postnasal drip, rhinorrhea, sinus pressure, sinus pain, sneezing, sore throat, tinnitus, trouble swallowing and voice change. Eyes:  Negative for photophobia, pain, discharge, redness, itching and visual disturbance. Respiratory:  Negative for cough, chest tightness, shortness of breath and wheezing. Cardiovascular:  Negative for chest pain, palpitations and leg swelling. Gastrointestinal:  Negative for abdominal distention, abdominal pain, blood in stool, constipation, diarrhea, nausea and vomiting. Endocrine: Negative for cold intolerance, heat intolerance, polydipsia, polyphagia and polyuria. Genitourinary:  Negative for difficulty urinating, dysuria, enuresis, frequency, hematuria and urgency. Musculoskeletal:  Positive for back pain and neck pain. Chronic low back and neck pain   Skin:  Negative for color change, pallor, rash and wound. Allergic/Immunologic: Positive for environmental allergies. Negative for food allergies and immunocompromised state. Neurological:  Positive for tremors. Negative for dizziness, syncope, speech difficulty, weakness, light-headedness, numbness and headaches. Psychiatric/Behavioral:  Negative for agitation, behavioral problems, confusion, decreased concentration, dysphoric mood, hallucinations, self-injury, sleep disturbance and suicidal ideas. The patient is nervous/anxious. The patient is not hyperactive. Anxiety is stable     /62   Pulse 58   Ht 6' (1.829 m)   Wt 170 lb 3.2 oz (77.2 kg)   SpO2 100%   BMI 23.08 kg/m²   Physical Exam  Vitals and nursing note reviewed. Constitutional:       General: He is not in acute distress. Appearance: Normal appearance. He is well-developed and normal weight. He is not ill-appearing, toxic-appearing or diaphoretic. HENT:      Head: Normocephalic and atraumatic. Right Ear: Tympanic membrane, ear canal and external ear normal. There is no impacted cerumen.       Left Ear: Tympanic membrane, ear canal and external ear normal. There is no impacted cerumen. Nose: Nose normal. No congestion or rhinorrhea. Mouth/Throat:      Mouth: Mucous membranes are moist.      Pharynx: Oropharynx is clear. No oropharyngeal exudate or posterior oropharyngeal erythema. Eyes:      General: No scleral icterus. Right eye: No discharge. Left eye: No discharge. Extraocular Movements: Extraocular movements intact. Conjunctiva/sclera: Conjunctivae normal.      Pupils: Pupils are equal, round, and reactive to light. Neck:      Thyroid: No thyromegaly. Vascular: No carotid bruit or JVD. Trachea: No tracheal deviation. Cardiovascular:      Rate and Rhythm: Normal rate and regular rhythm. Pulses: Normal pulses. Heart sounds: Normal heart sounds. No murmur heard. No friction rub. No gallop. Pulmonary:      Effort: Pulmonary effort is normal. No respiratory distress. Breath sounds: Normal breath sounds. No stridor. No wheezing, rhonchi or rales. Chest:      Chest wall: No tenderness. Abdominal:      General: Bowel sounds are normal. There is no distension. Palpations: Abdomen is soft. There is no mass. Tenderness: There is no abdominal tenderness. There is no right CVA tenderness, left CVA tenderness, guarding or rebound. Hernia: No hernia is present. Musculoskeletal:         General: Tenderness present. No swelling, deformity or signs of injury. Normal range of motion. Cervical back: Normal range of motion and neck supple. No rigidity. No muscular tenderness. Right lower leg: No edema. Left lower leg: No edema. Comments: Gait antalgic. There is tenderness over the lower lumbar spine   Lymphadenopathy:      Cervical: No cervical adenopathy. Skin:     General: Skin is warm and dry. Capillary Refill: Capillary refill takes less than 2 seconds. Coloration: Skin is not jaundiced or pale. Findings: No bruising, erythema, lesion or rash. Neurological:      General: No focal deficit present. Mental Status: He is alert and oriented to person, place, and time. Mental status is at baseline. Cranial Nerves: No cranial nerve deficit. Sensory: No sensory deficit. Motor: No weakness or abnormal muscle tone. Coordination: Coordination normal.      Gait: Gait normal.      Deep Tendon Reflexes: Reflexes are normal and symmetric. Reflexes normal.      Comments: Tremor to hands noted   Psychiatric:         Mood and Affect: Mood normal.         Behavior: Behavior normal.         Thought Content: Thought content normal.         Judgment: Judgment normal.       1. Essential tremor    2. High risk medication use    3. Anxiety disorder, unspecified type    4. Chronic low back pain, unspecified back pain laterality, unspecified whether sciatica present    5. Neck pain    6. Nausea    7. Gastroesophageal reflux disease without esophagitis    8. Environmental allergies        ASSESSMENT/PLAN:    57-year-old male here for follow-up    1. Tremors: Continue Inderal as prescribed    2. Chronic neck and back pain: Increase Percocet to 10 mg with next refill. He states that overall, his pain is stable, but he is having, more back pain and neck pain at this time. He is requesting an increase in his medication. He has been advised to minimize dose of his pain medications is much as possible    3. Nausea: Secondary to pain medication. Continue Phenergan as needed    4. Anxiety: Ativan at bedtime as needed for sleep    5. Muscle spasms of neck and back: Continue Valium as needed    6. Acid reflux: Stable on Prilosec    7. Environmental allergies: Stable on Claritin          Kita Salmon was seen today for 3 month follow-up.     Diagnoses and all orders for this visit:    Essential tremor    High risk medication use  -     POCT Rapid Drug Screen    Anxiety disorder, unspecified type  -     CBC with Auto Differential; Future  -     Comprehensive Metabolic Panel; Future  -     Hemoglobin A1C; Future  -     Lipid Panel; Future  -     TSH; Future  -     PSA Screening; Future    Chronic low back pain, unspecified back pain laterality, unspecified whether sciatica present  -     oxyCODONE-acetaminophen (PERCOCET)  MG per tablet; Take 1 tablet by mouth every 6 hours as needed for Pain for up to 30 days.  Intended supply: 30 days Max Daily Amount: 4 tablets    Neck pain    Nausea    Gastroesophageal reflux disease without esophagitis    Environmental allergies        Return in about 3 months (around 5/2/2023), or physical.     Orders Placed This Encounter   Procedures    CBC with Auto Differential     Fast 12 hours     Standing Status:   Future     Standing Expiration Date:   2/2/2024    Comprehensive Metabolic Panel     Fasting 12 hours     Standing Status:   Future     Standing Expiration Date:   2/2/2024    Hemoglobin A1C     Fast 12 hours     Standing Status:   Future     Standing Expiration Date:   2/2/2024    Lipid Panel     Standing Status:   Future     Standing Expiration Date:   2/2/2024     Order Specific Question:   Is Patient Fasting?/# of Hours     Answer:   12    TSH     Fast 12 hours     Standing Status:   Future     Standing Expiration Date:   2/2/2024    PSA Screening     Standing Status:   Future     Standing Expiration Date:   2/2/2024    POCT Rapid Drug Sarai Campbell MD

## 2023-02-03 RX ORDER — OXYCODONE AND ACETAMINOPHEN 10; 325 MG/1; MG/1
1 TABLET ORAL EVERY 6 HOURS PRN
Qty: 120 TABLET | Refills: 0
Start: 2023-02-03 | End: 2023-03-05

## 2023-02-03 ASSESSMENT — ENCOUNTER SYMPTOMS
ABDOMINAL PAIN: 0
CONSTIPATION: 0
EYE DISCHARGE: 0
VOMITING: 0
PHOTOPHOBIA: 0
ABDOMINAL DISTENTION: 0
TROUBLE SWALLOWING: 0
EYE PAIN: 0
RHINORRHEA: 0
COUGH: 0
WHEEZING: 0
DIARRHEA: 0
VOICE CHANGE: 0
BLOOD IN STOOL: 0
SORE THROAT: 0
SINUS PRESSURE: 0
CHEST TIGHTNESS: 0
SINUS PAIN: 0
EYE ITCHING: 0
BACK PAIN: 1
NAUSEA: 0
SHORTNESS OF BREATH: 0
COLOR CHANGE: 0
EYE REDNESS: 0

## 2023-02-16 DIAGNOSIS — M54.50 CHRONIC LOW BACK PAIN, UNSPECIFIED BACK PAIN LATERALITY, UNSPECIFIED WHETHER SCIATICA PRESENT: ICD-10-CM

## 2023-02-16 DIAGNOSIS — G89.29 CHRONIC LOW BACK PAIN, UNSPECIFIED BACK PAIN LATERALITY, UNSPECIFIED WHETHER SCIATICA PRESENT: ICD-10-CM

## 2023-02-16 RX ORDER — OXYCODONE AND ACETAMINOPHEN 7.5; 325 MG/1; MG/1
TABLET ORAL
Qty: 120 TABLET | Refills: 0 | Status: SHIPPED | OUTPATIENT
Start: 2023-02-16 | End: 2023-03-18

## 2023-02-20 DIAGNOSIS — F41.9 ANXIETY: ICD-10-CM

## 2023-02-20 RX ORDER — DIAZEPAM 10 MG/1
TABLET ORAL
Qty: 60 TABLET | Refills: 0 | Status: SHIPPED | OUTPATIENT
Start: 2023-02-22 | End: 2023-03-20

## 2023-02-20 NOTE — TELEPHONE ENCOUNTER
Chelo Carr called to request a refill on his medication. Last office visit : 2/2/2023   Next office visit : 5/4/2023     Last UDS:   Amphetamine Screen, Urine   Date Value Ref Range Status   02/02/2023 neg  Final     Barbiturate Screen, Urine   Date Value Ref Range Status   02/02/2023 neg  Final     Benzodiazepine Screen, Urine   Date Value Ref Range Status   02/02/2023 positive  Final     Comment:     Pt takes Lorazepam 0.5 mg     Buprenorphine Urine   Date Value Ref Range Status   02/02/2023 neg  Final     Cocaine Metabolite Screen, Urine   Date Value Ref Range Status   02/02/2023 neg  Final     Gabapentin Screen, Urine   Date Value Ref Range Status   02/02/2023 neg  Final     MDMA, Urine   Date Value Ref Range Status   02/02/2023 neg  Final     Methamphetamine, Urine   Date Value Ref Range Status   02/02/2023 n eg  Final     Opiate Scrn, Ur   Date Value Ref Range Status   02/02/2023 neg  Final     Oxycodone Screen, Ur   Date Value Ref Range Status   02/02/2023 positive  Final     Comment:     Pt takes Percocet 7.5-325     PCP Screen, Urine   Date Value Ref Range Status   02/02/2023 neg  Final     Propoxyphene Screen, Urine   Date Value Ref Range Status   02/02/2023 neg  Final     THC Screen, Urine   Date Value Ref Range Status   02/02/2023 neg  Final     Tricyclic Antidepressants, Urine   Date Value Ref Range Status   02/02/2023 neg  Final       Last Ether Keas: 2/14/2023  Medication Contract: 11/30/2021     Requested Prescriptions     Pending Prescriptions Disp Refills    diazePAM (VALIUM) 10 MG tablet [Pharmacy Med Name: diazePAM 10 MG TABS 10 Tablet] 60 tablet 0     Sig: TAKE 1 TABLET BY MOUTH TWICE A DAY AS NEEDED FOR ANXIETY         Please approve or refuse this medication.    Josephine Patten MA

## 2023-02-24 DIAGNOSIS — G89.29 CHRONIC LOW BACK PAIN, UNSPECIFIED BACK PAIN LATERALITY, UNSPECIFIED WHETHER SCIATICA PRESENT: ICD-10-CM

## 2023-02-24 DIAGNOSIS — F41.9 ANXIETY: ICD-10-CM

## 2023-02-24 DIAGNOSIS — M54.50 CHRONIC LOW BACK PAIN, UNSPECIFIED BACK PAIN LATERALITY, UNSPECIFIED WHETHER SCIATICA PRESENT: ICD-10-CM

## 2023-02-24 RX ORDER — DIAZEPAM 10 MG/1
TABLET ORAL
Qty: 60 TABLET | Refills: 0 | OUTPATIENT
Start: 2023-02-24

## 2023-02-24 RX ORDER — PROMETHAZINE HYDROCHLORIDE 25 MG/1
TABLET ORAL
Qty: 90 TABLET | Refills: 0 | Status: SHIPPED | OUTPATIENT
Start: 2023-02-24

## 2023-02-24 RX ORDER — OXYCODONE AND ACETAMINOPHEN 7.5; 325 MG/1; MG/1
TABLET ORAL
Qty: 120 TABLET | Refills: 0 | OUTPATIENT
Start: 2023-02-24 | End: 2023-03-26

## 2023-02-27 DIAGNOSIS — F41.9 ANXIETY DISORDER, UNSPECIFIED TYPE: ICD-10-CM

## 2023-02-27 RX ORDER — LORAZEPAM 0.5 MG/1
TABLET ORAL
Qty: 90 TABLET | Refills: 0 | Status: SHIPPED | OUTPATIENT
Start: 2023-03-02 | End: 2023-03-29

## 2023-03-14 DIAGNOSIS — M54.50 CHRONIC LOW BACK PAIN, UNSPECIFIED BACK PAIN LATERALITY, UNSPECIFIED WHETHER SCIATICA PRESENT: ICD-10-CM

## 2023-03-14 DIAGNOSIS — G89.29 CHRONIC LOW BACK PAIN, UNSPECIFIED BACK PAIN LATERALITY, UNSPECIFIED WHETHER SCIATICA PRESENT: ICD-10-CM

## 2023-03-14 RX ORDER — OXYCODONE AND ACETAMINOPHEN 7.5; 325 MG/1; MG/1
TABLET ORAL
Qty: 120 TABLET | Refills: 0 | Status: SHIPPED | OUTPATIENT
Start: 2023-03-18 | End: 2023-04-17

## 2023-03-20 DIAGNOSIS — F41.9 ANXIETY: ICD-10-CM

## 2023-03-20 RX ORDER — DIAZEPAM 10 MG/1
TABLET ORAL
Qty: 60 TABLET | Refills: 0 | Status: SHIPPED | OUTPATIENT
Start: 2023-03-23 | End: 2023-04-20

## 2023-03-24 RX ORDER — PROMETHAZINE HYDROCHLORIDE 25 MG/1
TABLET ORAL
Qty: 90 TABLET | Refills: 0 | Status: SHIPPED | OUTPATIENT
Start: 2023-03-24

## 2023-03-29 DIAGNOSIS — F41.9 ANXIETY DISORDER, UNSPECIFIED TYPE: ICD-10-CM

## 2023-03-29 RX ORDER — LORAZEPAM 0.5 MG/1
TABLET ORAL
Qty: 90 TABLET | Refills: 0 | Status: SHIPPED | OUTPATIENT
Start: 2023-03-30 | End: 2023-04-20

## 2023-04-20 DIAGNOSIS — F41.9 ANXIETY: ICD-10-CM

## 2023-04-20 DIAGNOSIS — F41.9 ANXIETY DISORDER, UNSPECIFIED TYPE: ICD-10-CM

## 2023-04-20 RX ORDER — LORAZEPAM 0.5 MG/1
TABLET ORAL
Qty: 90 TABLET | Refills: 1 | Status: SHIPPED | OUTPATIENT
Start: 2023-04-20 | End: 2023-06-15

## 2023-04-20 RX ORDER — PROMETHAZINE HYDROCHLORIDE 25 MG/1
TABLET ORAL
Qty: 90 TABLET | Refills: 0 | Status: SHIPPED | OUTPATIENT
Start: 2023-04-20 | End: 2023-05-18

## 2023-04-20 RX ORDER — DIAZEPAM 10 MG/1
TABLET ORAL
Qty: 60 TABLET | Refills: 1 | Status: SHIPPED | OUTPATIENT
Start: 2023-04-20 | End: 2023-06-15

## 2023-04-20 RX ORDER — PROPRANOLOL HYDROCHLORIDE 60 MG/1
60 TABLET ORAL 2 TIMES DAILY
Qty: 60 TABLET | Refills: 1 | Status: SHIPPED | OUTPATIENT
Start: 2023-04-20 | End: 2023-05-23

## 2023-05-15 DIAGNOSIS — G89.29 CHRONIC LOW BACK PAIN, UNSPECIFIED BACK PAIN LATERALITY, UNSPECIFIED WHETHER SCIATICA PRESENT: ICD-10-CM

## 2023-05-15 DIAGNOSIS — M54.50 CHRONIC LOW BACK PAIN, UNSPECIFIED BACK PAIN LATERALITY, UNSPECIFIED WHETHER SCIATICA PRESENT: ICD-10-CM

## 2023-05-15 RX ORDER — OXYCODONE AND ACETAMINOPHEN 7.5; 325 MG/1; MG/1
TABLET ORAL
Qty: 40 TABLET | Refills: 0 | Status: SHIPPED | OUTPATIENT
Start: 2023-05-15 | End: 2023-05-25

## 2023-05-18 RX ORDER — PROMETHAZINE HYDROCHLORIDE 25 MG/1
TABLET ORAL
Qty: 90 TABLET | Refills: 0 | Status: SHIPPED | OUTPATIENT
Start: 2023-05-18 | End: 2023-06-15

## 2023-05-23 RX ORDER — PROPRANOLOL HYDROCHLORIDE 60 MG/1
60 TABLET ORAL 2 TIMES DAILY
Qty: 60 TABLET | Refills: 1 | Status: SHIPPED | OUTPATIENT
Start: 2023-05-23 | End: 2023-08-21

## 2023-05-23 NOTE — TELEPHONE ENCOUNTER
Margarita Mitchell called requesting a refill of the below medication which has been pended for you:     Requested Prescriptions     Pending Prescriptions Disp Refills    propranolol (INDERAL) 60 MG tablet [Pharmacy Med Name: PROPRANOLOL HCL 60 MG TABS 60 Tablet] 60 tablet 1     Sig: TAKE 1 TABLET BY MOUTH 2 TIMES DAILY       Last Appointment Date: 2/2/2023  Next Appointment Date: 6/19/2023    No Known Allergies

## 2023-06-15 DIAGNOSIS — F41.9 ANXIETY DISORDER, UNSPECIFIED TYPE: ICD-10-CM

## 2023-06-15 DIAGNOSIS — F41.9 ANXIETY: ICD-10-CM

## 2023-06-15 RX ORDER — PROMETHAZINE HYDROCHLORIDE 25 MG/1
TABLET ORAL
Qty: 90 TABLET | Refills: 0 | Status: SHIPPED | OUTPATIENT
Start: 2023-06-15 | End: 2023-07-19

## 2023-06-15 RX ORDER — LORAZEPAM 0.5 MG/1
TABLET ORAL
Qty: 90 TABLET | Refills: 0 | Status: SHIPPED | OUTPATIENT
Start: 2023-06-29 | End: 2023-07-20

## 2023-06-15 RX ORDER — DIAZEPAM 10 MG/1
TABLET ORAL
Qty: 60 TABLET | Refills: 0 | Status: SHIPPED | OUTPATIENT
Start: 2023-06-21 | End: 2023-07-20

## 2023-06-15 NOTE — TELEPHONE ENCOUNTER
Rickey Herr called to request a refill on his medication.       Last office visit : 2/2/2023   Next office visit : 6/15/2023     Last UDS:   Amphetamine Screen, Urine   Date Value Ref Range Status   02/02/2023 neg  Final     Barbiturate Screen, Urine   Date Value Ref Range Status   02/02/2023 neg  Final     Benzodiazepine Screen, Urine   Date Value Ref Range Status   02/02/2023 positive  Final     Comment:     Pt takes Lorazepam 0.5 mg     Buprenorphine Urine   Date Value Ref Range Status   02/02/2023 neg  Final     Cocaine Metabolite Screen, Urine   Date Value Ref Range Status   02/02/2023 neg  Final     Gabapentin Screen, Urine   Date Value Ref Range Status   02/02/2023 neg  Final     MDMA, Urine   Date Value Ref Range Status   02/02/2023 neg  Final     Methamphetamine, Urine   Date Value Ref Range Status   02/02/2023 n eg  Final     Opiate Scrn, Ur   Date Value Ref Range Status   02/02/2023 neg  Final     Oxycodone Screen, Ur   Date Value Ref Range Status   02/02/2023 positive  Final     Comment:     Pt takes Percocet 7.5-325     PCP Screen, Urine   Date Value Ref Range Status   02/02/2023 neg  Final     Propoxyphene Screen, Urine   Date Value Ref Range Status   02/02/2023 neg  Final     THC Screen, Urine   Date Value Ref Range Status   02/02/2023 neg  Final     Tricyclic Antidepressants, Urine   Date Value Ref Range Status   02/02/2023 neg  Final       Last Jie Tolentino: 02/14/23  Medication Contract: 11/30/21   Last Fill: 04/20/21    Requested Prescriptions     Pending Prescriptions Disp Refills    LORazepam (ATIVAN) 0.5 MG tablet [Pharmacy Med Name: LORazepam 0.5 MG TABS 0.5 Tablet] 90 tablet 1     Sig: TAKE 1 TABLET BY MOUTH EVERY 8 HOURS AS NEEDED FOR ANXIETY    promethazine (PHENERGAN) 25 MG tablet [Pharmacy Med Name: PROMETHAZINE 25 MG TABLET 25 Tablet] 90 tablet 0     Sig: TAKE 1 TABLET BY MOUTH EVERY 6 HOURS AS NEEDED FOR NAUSEA    diazePAM (VALIUM) 10 MG tablet [Pharmacy Med Name: diazePAM 10 MG TABS 10

## 2023-06-19 ENCOUNTER — OFFICE VISIT (OUTPATIENT)
Dept: INTERNAL MEDICINE | Age: 53
End: 2023-06-19

## 2023-06-19 VITALS
BODY MASS INDEX: 23.4 KG/M2 | OXYGEN SATURATION: 96 % | HEIGHT: 72 IN | DIASTOLIC BLOOD PRESSURE: 78 MMHG | WEIGHT: 172.8 LBS | SYSTOLIC BLOOD PRESSURE: 110 MMHG | RESPIRATION RATE: 20 BRPM | HEART RATE: 60 BPM

## 2023-06-19 DIAGNOSIS — M54.50 CHRONIC LOW BACK PAIN, UNSPECIFIED BACK PAIN LATERALITY, UNSPECIFIED WHETHER SCIATICA PRESENT: ICD-10-CM

## 2023-06-19 DIAGNOSIS — M54.2 CERVICALGIA: ICD-10-CM

## 2023-06-19 DIAGNOSIS — F41.9 ANXIETY DISORDER, UNSPECIFIED TYPE: ICD-10-CM

## 2023-06-19 DIAGNOSIS — G89.29 CHRONIC LOW BACK PAIN, UNSPECIFIED BACK PAIN LATERALITY, UNSPECIFIED WHETHER SCIATICA PRESENT: ICD-10-CM

## 2023-06-19 DIAGNOSIS — Z91.09 ENVIRONMENTAL ALLERGIES: ICD-10-CM

## 2023-06-19 DIAGNOSIS — G25.0 ESSENTIAL TREMOR: ICD-10-CM

## 2023-06-19 DIAGNOSIS — Z00.00 ROUTINE ADULT HEALTH MAINTENANCE: Primary | ICD-10-CM

## 2023-06-19 DIAGNOSIS — Z79.899 MEDICATION MANAGEMENT: ICD-10-CM

## 2023-06-19 PROCEDURE — 99396 PREV VISIT EST AGE 40-64: CPT | Performed by: INTERNAL MEDICINE

## 2023-06-19 SDOH — ECONOMIC STABILITY: INCOME INSECURITY: HOW HARD IS IT FOR YOU TO PAY FOR THE VERY BASICS LIKE FOOD, HOUSING, MEDICAL CARE, AND HEATING?: NOT HARD AT ALL

## 2023-06-19 SDOH — ECONOMIC STABILITY: FOOD INSECURITY: WITHIN THE PAST 12 MONTHS, THE FOOD YOU BOUGHT JUST DIDN'T LAST AND YOU DIDN'T HAVE MONEY TO GET MORE.: NEVER TRUE

## 2023-06-19 SDOH — ECONOMIC STABILITY: FOOD INSECURITY: WITHIN THE PAST 12 MONTHS, YOU WORRIED THAT YOUR FOOD WOULD RUN OUT BEFORE YOU GOT MONEY TO BUY MORE.: NEVER TRUE

## 2023-06-19 ASSESSMENT — ENCOUNTER SYMPTOMS
TROUBLE SWALLOWING: 0
CHEST TIGHTNESS: 0
EYE ITCHING: 0
DIARRHEA: 0
COLOR CHANGE: 0
RHINORRHEA: 0
VOMITING: 0
NAUSEA: 0
COUGH: 0
WHEEZING: 0
ABDOMINAL DISTENTION: 0
EYE PAIN: 0
SINUS PAIN: 0
VOICE CHANGE: 0
PHOTOPHOBIA: 0
ABDOMINAL PAIN: 0
SHORTNESS OF BREATH: 0
BLOOD IN STOOL: 0
SORE THROAT: 0
CONSTIPATION: 0
BACK PAIN: 1
EYE DISCHARGE: 0
EYE REDNESS: 0
SINUS PRESSURE: 0

## 2023-06-19 ASSESSMENT — PATIENT HEALTH QUESTIONNAIRE - PHQ9
SUM OF ALL RESPONSES TO PHQ QUESTIONS 1-9: 0
SUM OF ALL RESPONSES TO PHQ QUESTIONS 1-9: 0
2. FEELING DOWN, DEPRESSED OR HOPELESS: 0
1. LITTLE INTEREST OR PLEASURE IN DOING THINGS: 0
SUM OF ALL RESPONSES TO PHQ QUESTIONS 1-9: 0
SUM OF ALL RESPONSES TO PHQ QUESTIONS 1-9: 0
SUM OF ALL RESPONSES TO PHQ9 QUESTIONS 1 & 2: 0

## 2023-06-19 NOTE — PROGRESS NOTES
Mental status is at baseline. Cranial Nerves: No cranial nerve deficit. Sensory: No sensory deficit. Motor: No weakness or abnormal muscle tone. Coordination: Coordination normal.      Gait: Gait normal.      Deep Tendon Reflexes: Reflexes are normal and symmetric. Reflexes normal.      Comments: Tremor to hands noted   Psychiatric:         Mood and Affect: Mood normal.         Behavior: Behavior normal.         Thought Content: Thought content normal.         Judgment: Judgment normal.       1. Routine adult health maintenance    2. Medication management    3. Anxiety disorder, unspecified type    4. Chronic low back pain, unspecified back pain laterality, unspecified whether sciatica present    5. Cervicalgia    6. Essential tremor    7. Environmental allergies        ASSESSMENT/PLAN:    47-year-old male here for follow-up    1. Health maintenance: Routine screening is as per HPI. Labs are currently pending. 2. Anxiety: Continue Ativan as needed    3. Chronic neck and back pain: Continue Percocet and Valium as needed    4. Tremors: Doing well with Inderal.  Valium is also helpful for tremors    5. Acid reflux: Continue Prilosec as prescribed    6. Environmental allergies: Doing well with Claritin      Penny Cones was seen today for annual exam.    Diagnoses and all orders for this visit:    Routine adult health maintenance    Medication management  -     POCT Rapid Drug Screen    Anxiety disorder, unspecified type    Chronic low back pain, unspecified back pain laterality, unspecified whether sciatica present    Cervicalgia    Essential tremor    Environmental allergies          Return in about 3 months (around 9/19/2023).      Orders Placed This Encounter   Procedures    POCT Yamileth Mcbride MD

## 2023-07-13 DIAGNOSIS — M54.50 CHRONIC LOW BACK PAIN, UNSPECIFIED BACK PAIN LATERALITY, UNSPECIFIED WHETHER SCIATICA PRESENT: ICD-10-CM

## 2023-07-13 DIAGNOSIS — G89.29 CHRONIC LOW BACK PAIN, UNSPECIFIED BACK PAIN LATERALITY, UNSPECIFIED WHETHER SCIATICA PRESENT: ICD-10-CM

## 2023-07-13 RX ORDER — OXYCODONE AND ACETAMINOPHEN 10; 325 MG/1; MG/1
TABLET ORAL
Qty: 120 TABLET | Refills: 0 | Status: SHIPPED | OUTPATIENT
Start: 2023-07-16 | End: 2023-08-15

## 2023-07-13 NOTE — TELEPHONE ENCOUNTER
Ginette Barcenasjonatan called to request a refill on his medication. Last office visit : 6/19/2023   Next office visit : 9/22/2023     Last UDS:   Amphetamine Screen, Urine   Date Value Ref Range Status   06/19/2023 NEG  Final     Barbiturate Screen, Urine   Date Value Ref Range Status   06/19/2023 NEG  Final     Benzodiazepine Screen, Urine   Date Value Ref Range Status   06/19/2023 POS  Final     Comment:     currently taking valium and ativan     Buprenorphine Urine   Date Value Ref Range Status   06/19/2023 NEG  Final     Cocaine Metabolite Screen, Urine   Date Value Ref Range Status   06/19/2023 NEG  Final     Gabapentin Screen, Urine   Date Value Ref Range Status   06/19/2023 NEG  Final     MDMA, Urine   Date Value Ref Range Status   06/19/2023 NEG  Final     Methamphetamine, Urine   Date Value Ref Range Status   06/19/2023 NEG  Final     Opiate Scrn, Ur   Date Value Ref Range Status   06/19/2023 POS  Final     Comment:     currently taking percocet     Oxycodone Screen, Ur   Date Value Ref Range Status   06/19/2023 POS  Final     Comment:     currently taking percocet     PCP Screen, Urine   Date Value Ref Range Status   06/19/2023 NEG  Final     Propoxyphene Screen, Urine   Date Value Ref Range Status   06/19/2023 NEG  Final     THC Screen, Urine   Date Value Ref Range Status   06/19/2023 NEG  Final     Tricyclic Antidepressants, Urine   Date Value Ref Range Status   06/19/2023 NEG  Final       Last Shellia Spore: 06/19/2023  Medication Contract: 06/19/2023     Requested Prescriptions     Pending Prescriptions Disp Refills    oxyCODONE-acetaminophen (PERCOCET)  MG per tablet [Pharmacy Med Name: OXYCOD/APAP   Tablet] 120 tablet 0     Sig: TAKE 1 TABLET BY MOUTH EVERY 6 HOURS AS NEEDED FOR PAIN         Please approve or refuse this medication.    Dennie Pickup, LPN

## 2023-07-18 NOTE — TELEPHONE ENCOUNTER
Virlinda Halsted called requesting a refill of the below medication which has been pended for you:     Requested Prescriptions     Pending Prescriptions Disp Refills    promethazine (PHENERGAN) 25 MG tablet [Pharmacy Med Name: PROMETHAZINE 25 MG TABLET 25 Tablet] 90 tablet 0     Sig: TAKE 1 TABLET BY MOUTH EVERY 6 HOURS AS NEEDED FOR NAUSEA       Last Appointment Date: 6/19/2023  Next Appointment Date: 9/22/2023    No Known Allergies

## 2023-07-19 DIAGNOSIS — F41.9 ANXIETY DISORDER, UNSPECIFIED TYPE: ICD-10-CM

## 2023-07-19 RX ORDER — PROPRANOLOL HYDROCHLORIDE 60 MG/1
60 TABLET ORAL 2 TIMES DAILY
Qty: 60 TABLET | Refills: 1 | Status: SHIPPED | OUTPATIENT
Start: 2023-07-19 | End: 2023-10-17

## 2023-07-19 RX ORDER — PROMETHAZINE HYDROCHLORIDE 25 MG/1
TABLET ORAL
Qty: 90 TABLET | Refills: 0 | Status: SHIPPED | OUTPATIENT
Start: 2023-07-19

## 2023-07-20 DIAGNOSIS — F41.9 ANXIETY: ICD-10-CM

## 2023-07-20 RX ORDER — DIAZEPAM 10 MG/1
TABLET ORAL
Qty: 60 TABLET | Refills: 1 | Status: SHIPPED | OUTPATIENT
Start: 2023-07-21 | End: 2023-09-18

## 2023-07-20 RX ORDER — LORAZEPAM 0.5 MG/1
TABLET ORAL
Qty: 90 TABLET | Refills: 1 | Status: SHIPPED | OUTPATIENT
Start: 2023-07-27 | End: 2023-08-26

## 2023-07-20 NOTE — TELEPHONE ENCOUNTER
Angy Gates called to request a refill on his medication. Last office visit : 6/19/2023   Next office visit : 9/22/2023     Last UDS:   Amphetamine Screen, Urine   Date Value Ref Range Status   06/19/2023 NEG  Final     Barbiturate Screen, Urine   Date Value Ref Range Status   06/19/2023 NEG  Final     Benzodiazepine Screen, Urine   Date Value Ref Range Status   06/19/2023 POS  Final     Comment:     currently taking valium and ativan     Buprenorphine Urine   Date Value Ref Range Status   06/19/2023 NEG  Final     Cocaine Metabolite Screen, Urine   Date Value Ref Range Status   06/19/2023 NEG  Final     Gabapentin Screen, Urine   Date Value Ref Range Status   06/19/2023 NEG  Final     MDMA, Urine   Date Value Ref Range Status   06/19/2023 NEG  Final     Methamphetamine, Urine   Date Value Ref Range Status   06/19/2023 NEG  Final     Opiate Scrn, Ur   Date Value Ref Range Status   06/19/2023 POS  Final     Comment:     currently taking percocet     Oxycodone Screen, Ur   Date Value Ref Range Status   06/19/2023 POS  Final     Comment:     currently taking percocet     PCP Screen, Urine   Date Value Ref Range Status   06/19/2023 NEG  Final     Propoxyphene Screen, Urine   Date Value Ref Range Status   06/19/2023 NEG  Final     THC Screen, Urine   Date Value Ref Range Status   06/19/2023 NEG  Final     Tricyclic Antidepressants, Urine   Date Value Ref Range Status   06/19/2023 NEG  Final       Last Montserrat Mainland: 06/19/2023  Medication Contract: 06/19/2023     Requested Prescriptions     Pending Prescriptions Disp Refills    diazePAM (VALIUM) 10 MG tablet [Pharmacy Med Name: diazePAM 10 MG TABS 10 Tablet] 60 tablet 0     Sig: TAKE 1 TABLET BY MOUTH TWICE A DAY AS NEEDED FOR ANXIETY 03-23-23         Please approve or refuse this medication.    Robbin Vidales, JOSE

## 2023-07-20 NOTE — TELEPHONE ENCOUNTER
Andrés Morales called requesting a refill of the below medication which has been pended for you:     Requested Prescriptions     Pending Prescriptions Disp Refills    LORazepam (ATIVAN) 0.5 MG tablet [Pharmacy Med Name: LORazepam 0.5 MG TABS 0.5 Tablet] 90 tablet 0     Sig: TAKE 1 TABLET BY MOUTH EVERY 8 HOURS AS NEEDED FOR ANXIETY       Last Appointment Date: 6/19/2023  Next Appointment Date: 9/22/2023    No Known Allergies

## 2023-08-03 RX ORDER — PROMETHAZINE HYDROCHLORIDE 25 MG/1
TABLET ORAL
Qty: 90 TABLET | Refills: 0 | Status: SHIPPED | OUTPATIENT
Start: 2023-08-03

## 2023-08-03 NOTE — TELEPHONE ENCOUNTER
Dania Celis called to request a refill on his medication. Last office visit : 6/19/2023   Next office visit : 9/22/2023     Last UDS:   Amphetamine Screen, Urine   Date Value Ref Range Status   06/19/2023 NEG  Final     Barbiturate Screen, Urine   Date Value Ref Range Status   06/19/2023 NEG  Final     Benzodiazepine Screen, Urine   Date Value Ref Range Status   06/19/2023 POS  Final     Comment:     currently taking valium and ativan     Buprenorphine Urine   Date Value Ref Range Status   06/19/2023 NEG  Final     Cocaine Metabolite Screen, Urine   Date Value Ref Range Status   06/19/2023 NEG  Final     Gabapentin Screen, Urine   Date Value Ref Range Status   06/19/2023 NEG  Final     MDMA, Urine   Date Value Ref Range Status   06/19/2023 NEG  Final     Methamphetamine, Urine   Date Value Ref Range Status   06/19/2023 NEG  Final     Opiate Scrn, Ur   Date Value Ref Range Status   06/19/2023 POS  Final     Comment:     currently taking percocet     Oxycodone Screen, Ur   Date Value Ref Range Status   06/19/2023 POS  Final     Comment:     currently taking percocet     PCP Screen, Urine   Date Value Ref Range Status   06/19/2023 NEG  Final     Propoxyphene Screen, Urine   Date Value Ref Range Status   06/19/2023 NEG  Final     THC Screen, Urine   Date Value Ref Range Status   06/19/2023 NEG  Final     Tricyclic Antidepressants, Urine   Date Value Ref Range Status   06/19/2023 NEG  Final       Olga Cave: 06/19/2023  UDS 6/19/2023  Med contract  6/19/2023    Requested Prescriptions     Pending Prescriptions Disp Refills    promethazine (PHENERGAN) 25 MG tablet [Pharmacy Med Name: PROMETHAZINE 25 MG TABLET 25 Tablet] 90 tablet 0     Sig: TAKE 1 TABLET BY MOUTH EVERY 6 HOURS AS NEEDED FOR NAUSEA         Please approve or refuse this medication.    April Cathleen [place], [place]

## 2023-08-15 DIAGNOSIS — M54.50 CHRONIC LOW BACK PAIN, UNSPECIFIED BACK PAIN LATERALITY, UNSPECIFIED WHETHER SCIATICA PRESENT: ICD-10-CM

## 2023-08-15 DIAGNOSIS — G89.29 CHRONIC LOW BACK PAIN, UNSPECIFIED BACK PAIN LATERALITY, UNSPECIFIED WHETHER SCIATICA PRESENT: ICD-10-CM

## 2023-08-15 RX ORDER — OXYCODONE AND ACETAMINOPHEN 10; 325 MG/1; MG/1
TABLET ORAL
Qty: 120 TABLET | Refills: 0 | Status: SHIPPED | OUTPATIENT
Start: 2023-08-16 | End: 2023-09-10

## 2023-08-15 NOTE — TELEPHONE ENCOUNTER
Janie Mendez called to request a refill on his medication. Last office visit : 6/19/2023   Next office visit : 9/22/2023     Last UDS:   Amphetamine Screen, Urine   Date Value Ref Range Status   06/19/2023 NEG  Final     Barbiturate Screen, Urine   Date Value Ref Range Status   06/19/2023 NEG  Final     Benzodiazepine Screen, Urine   Date Value Ref Range Status   06/19/2023 POS  Final     Comment:     currently taking valium and ativan     Buprenorphine Urine   Date Value Ref Range Status   06/19/2023 NEG  Final     Cocaine Metabolite Screen, Urine   Date Value Ref Range Status   06/19/2023 NEG  Final     Gabapentin Screen, Urine   Date Value Ref Range Status   06/19/2023 NEG  Final     MDMA, Urine   Date Value Ref Range Status   06/19/2023 NEG  Final     Methamphetamine, Urine   Date Value Ref Range Status   06/19/2023 NEG  Final     Opiate Scrn, Ur   Date Value Ref Range Status   06/19/2023 POS  Final     Comment:     currently taking percocet     Oxycodone Screen, Ur   Date Value Ref Range Status   06/19/2023 POS  Final     Comment:     currently taking percocet     PCP Screen, Urine   Date Value Ref Range Status   06/19/2023 NEG  Final     Propoxyphene Screen, Urine   Date Value Ref Range Status   06/19/2023 NEG  Final     THC Screen, Urine   Date Value Ref Range Status   06/19/2023 NEG  Final     Tricyclic Antidepressants, Urine   Date Value Ref Range Status   06/19/2023 NEG  Final       Last Danilo Champ: 06/19/2023  Medication Contract: 06/19/23     Requested Prescriptions     Pending Prescriptions Disp Refills    oxyCODONE-acetaminophen (PERCOCET)  MG per tablet [Pharmacy Med Name: OXYCOD/APAP   Tablet] 120 tablet 0     Sig: TAKE 1 TABLET BY MOUTH EVERY 6 HOURS AS NEEDED FOR PAIN         Please approve or refuse this medication.    Bryan Miner, WANDAN

## 2023-09-01 DIAGNOSIS — G89.29 CHRONIC LOW BACK PAIN, UNSPECIFIED BACK PAIN LATERALITY, UNSPECIFIED WHETHER SCIATICA PRESENT: ICD-10-CM

## 2023-09-01 DIAGNOSIS — F41.9 ANXIETY DISORDER, UNSPECIFIED TYPE: ICD-10-CM

## 2023-09-01 DIAGNOSIS — M54.50 CHRONIC LOW BACK PAIN, UNSPECIFIED BACK PAIN LATERALITY, UNSPECIFIED WHETHER SCIATICA PRESENT: ICD-10-CM

## 2023-09-01 DIAGNOSIS — F41.9 ANXIETY: ICD-10-CM

## 2023-09-01 RX ORDER — PROMETHAZINE HYDROCHLORIDE 25 MG/1
TABLET ORAL
Qty: 90 TABLET | Refills: 0 | OUTPATIENT
Start: 2023-09-01

## 2023-09-01 RX ORDER — OXYCODONE AND ACETAMINOPHEN 10; 325 MG/1; MG/1
TABLET ORAL
Qty: 120 TABLET | Refills: 0 | OUTPATIENT
Start: 2023-09-01 | End: 2023-10-01

## 2023-09-01 RX ORDER — LORAZEPAM 0.5 MG/1
TABLET ORAL
Qty: 90 TABLET | Refills: 1 | OUTPATIENT
Start: 2023-09-01

## 2023-09-01 RX ORDER — DIAZEPAM 10 MG/1
TABLET ORAL
Qty: 60 TABLET | Refills: 1 | OUTPATIENT
Start: 2023-09-01

## 2023-09-06 DIAGNOSIS — M54.50 CHRONIC LOW BACK PAIN, UNSPECIFIED BACK PAIN LATERALITY, UNSPECIFIED WHETHER SCIATICA PRESENT: ICD-10-CM

## 2023-09-06 DIAGNOSIS — G25.0 ESSENTIAL TREMOR: ICD-10-CM

## 2023-09-06 DIAGNOSIS — F41.9 ANXIETY: ICD-10-CM

## 2023-09-06 DIAGNOSIS — K21.9 GASTROESOPHAGEAL REFLUX DISEASE WITHOUT ESOPHAGITIS: Primary | ICD-10-CM

## 2023-09-06 DIAGNOSIS — G89.29 CHRONIC LOW BACK PAIN, UNSPECIFIED BACK PAIN LATERALITY, UNSPECIFIED WHETHER SCIATICA PRESENT: ICD-10-CM

## 2023-09-06 DIAGNOSIS — F41.9 ANXIETY DISORDER, UNSPECIFIED TYPE: ICD-10-CM

## 2023-09-06 RX ORDER — PROMETHAZINE HYDROCHLORIDE 25 MG/1
TABLET ORAL
Qty: 90 TABLET | Refills: 0 | Status: SHIPPED | OUTPATIENT
Start: 2023-09-06 | End: 2023-11-08

## 2023-09-06 RX ORDER — PROPRANOLOL HYDROCHLORIDE 60 MG/1
60 TABLET ORAL 2 TIMES DAILY
Qty: 60 TABLET | Refills: 1 | Status: SHIPPED | OUTPATIENT
Start: 2023-09-06 | End: 2023-11-08

## 2023-09-06 NOTE — TELEPHONE ENCOUNTER
Name: OXYCOD/APAP   Tablet] 120 tablet 0     Sig: TAKE 1 TABLET BY MOUTH EVERY 6 HOURS AS NEEDED FOR PAIN     Signed Prescriptions Disp Refills    promethazine (PHENERGAN) 25 MG tablet 90 tablet 0     Sig: TAKE 1 TABLET BY MOUTH EVERY 6 HOURS AS NEEDED FOR NAUSEA     Authorizing Provider: HAMILTON SINGLETON     Ordering User: Sonia PINEDO    propranolol (INDERAL) 60 MG tablet 60 tablet 1     Sig: TAKE 1 TABLET BY MOUTH 2 TIMES DAILY     Authorizing Provider: Maryfrances Cooks L     Ordering User: Amanda Logan         Please approve or refuse this medication.    Amanda Logan LPN

## 2023-09-10 RX ORDER — DIAZEPAM 10 MG/1
TABLET ORAL
Qty: 60 TABLET | Refills: 1 | OUTPATIENT
Start: 2023-09-10

## 2023-09-10 RX ORDER — OXYCODONE AND ACETAMINOPHEN 10; 325 MG/1; MG/1
TABLET ORAL
Qty: 120 TABLET | Refills: 0 | Status: SHIPPED | OUTPATIENT
Start: 2023-09-15 | End: 2023-10-13

## 2023-09-10 RX ORDER — LORAZEPAM 0.5 MG/1
TABLET ORAL
Qty: 90 TABLET | Refills: 1 | OUTPATIENT
Start: 2023-09-10

## 2023-09-18 DIAGNOSIS — F41.9 ANXIETY: ICD-10-CM

## 2023-09-18 RX ORDER — DIAZEPAM 10 MG/1
TABLET ORAL
Qty: 60 TABLET | Refills: 0 | Status: SHIPPED | OUTPATIENT
Start: 2023-09-18 | End: 2023-10-18

## 2023-09-18 NOTE — TELEPHONE ENCOUNTER
Lilliana Mitchell called to request a refill on his medication. Last office visit : 6/19/2023   Next office visit : 9/22/2023     Last UDS:   Amphetamine Screen, Urine   Date Value Ref Range Status   06/19/2023 NEG  Final     Barbiturate Screen, Urine   Date Value Ref Range Status   06/19/2023 NEG  Final     Benzodiazepine Screen, Urine   Date Value Ref Range Status   06/19/2023 POS  Final     Comment:     currently taking valium and ativan     Buprenorphine Urine   Date Value Ref Range Status   06/19/2023 NEG  Final     Cocaine Metabolite Screen, Urine   Date Value Ref Range Status   06/19/2023 NEG  Final     Gabapentin Screen, Urine   Date Value Ref Range Status   06/19/2023 NEG  Final     MDMA, Urine   Date Value Ref Range Status   06/19/2023 NEG  Final     Methamphetamine, Urine   Date Value Ref Range Status   06/19/2023 NEG  Final     Opiate Scrn, Ur   Date Value Ref Range Status   06/19/2023 POS  Final     Comment:     currently taking percocet     Oxycodone Screen, Ur   Date Value Ref Range Status   06/19/2023 POS  Final     Comment:     currently taking percocet     PCP Screen, Urine   Date Value Ref Range Status   06/19/2023 NEG  Final     Propoxyphene Screen, Urine   Date Value Ref Range Status   06/19/2023 NEG  Final     THC Screen, Urine   Date Value Ref Range Status   06/19/2023 NEG  Final     Tricyclic Antidepressants, Urine   Date Value Ref Range Status   06/19/2023 NEG  Final       Last Milly Gamez: 06/19/2023  Medication Contract: 06/19/2023     Requested Prescriptions     Pending Prescriptions Disp Refills    diazePAM (VALIUM) 10 MG tablet [Pharmacy Med Name: diazePAM 10 MG TABS 10 Tablet] 60 tablet 1     Sig: TAKE 1 TABLET BY MOUTH TWICE A DAY AS NEEDED FOR ANXIETY         Please approve or refuse this medication.    Mac Sims LPN

## 2023-09-22 ENCOUNTER — OFFICE VISIT (OUTPATIENT)
Dept: INTERNAL MEDICINE | Age: 53
End: 2023-09-22

## 2023-09-22 VITALS
BODY MASS INDEX: 22.11 KG/M2 | DIASTOLIC BLOOD PRESSURE: 68 MMHG | OXYGEN SATURATION: 99 % | SYSTOLIC BLOOD PRESSURE: 110 MMHG | WEIGHT: 163 LBS | HEART RATE: 63 BPM

## 2023-09-22 DIAGNOSIS — M62.838 MUSCLE SPASM: ICD-10-CM

## 2023-09-22 DIAGNOSIS — G89.29 CHRONIC NECK PAIN: Primary | ICD-10-CM

## 2023-09-22 DIAGNOSIS — Z91.09 ENVIRONMENTAL ALLERGIES: ICD-10-CM

## 2023-09-22 DIAGNOSIS — K21.9 GASTROESOPHAGEAL REFLUX DISEASE WITHOUT ESOPHAGITIS: ICD-10-CM

## 2023-09-22 DIAGNOSIS — M54.2 CHRONIC NECK PAIN: Primary | ICD-10-CM

## 2023-09-22 DIAGNOSIS — F41.9 ANXIETY DISORDER, UNSPECIFIED TYPE: ICD-10-CM

## 2023-09-22 DIAGNOSIS — G25.0 ESSENTIAL TREMOR: ICD-10-CM

## 2023-09-22 DIAGNOSIS — R11.0 NAUSEA: ICD-10-CM

## 2023-09-22 LAB
ALBUMIN SERPL-MCNC: 4 G/DL (ref 3.5–5.2)
ALP SERPL-CCNC: 59 U/L (ref 40–130)
ALT SERPL-CCNC: 25 U/L (ref 5–41)
ANION GAP SERPL CALCULATED.3IONS-SCNC: 11 MMOL/L (ref 7–19)
AST SERPL-CCNC: 30 U/L (ref 5–40)
BASOPHILS # BLD: 0.1 K/UL (ref 0–0.2)
BASOPHILS NFR BLD: 0.9 % (ref 0–1)
BILIRUB SERPL-MCNC: <0.2 MG/DL (ref 0.2–1.2)
BUN SERPL-MCNC: 17 MG/DL (ref 6–20)
CALCIUM SERPL-MCNC: 9.2 MG/DL (ref 8.6–10)
CHLORIDE SERPL-SCNC: 100 MMOL/L (ref 98–111)
CHOLEST SERPL-MCNC: 129 MG/DL (ref 160–199)
CO2 SERPL-SCNC: 27 MMOL/L (ref 22–29)
CREAT SERPL-MCNC: 1.1 MG/DL (ref 0.5–1.2)
EOSINOPHIL # BLD: 0.3 K/UL (ref 0–0.6)
EOSINOPHIL NFR BLD: 4.2 % (ref 0–5)
ERYTHROCYTE [DISTWIDTH] IN BLOOD BY AUTOMATED COUNT: 13.7 % (ref 11.5–14.5)
GLUCOSE SERPL-MCNC: 122 MG/DL (ref 74–109)
HBA1C MFR BLD: 5.5 % (ref 4–6)
HCT VFR BLD AUTO: 40.3 % (ref 42–52)
HDLC SERPL-MCNC: 50 MG/DL (ref 55–121)
HGB BLD-MCNC: 12.8 G/DL (ref 14–18)
IMM GRANULOCYTES # BLD: 0 K/UL
LDLC SERPL CALC-MCNC: 48 MG/DL
LYMPHOCYTES # BLD: 1.8 K/UL (ref 1.1–4.5)
LYMPHOCYTES NFR BLD: 27.4 % (ref 20–40)
MCH RBC QN AUTO: 30.5 PG (ref 27–31)
MCHC RBC AUTO-ENTMCNC: 31.8 G/DL (ref 33–37)
MCV RBC AUTO: 96 FL (ref 80–94)
MONOCYTES # BLD: 0.7 K/UL (ref 0–0.9)
MONOCYTES NFR BLD: 11.3 % (ref 0–10)
NEUTROPHILS # BLD: 3.6 K/UL (ref 1.5–7.5)
NEUTS SEG NFR BLD: 55.9 % (ref 50–65)
PLATELET # BLD AUTO: 222 K/UL (ref 130–400)
PMV BLD AUTO: 9.8 FL (ref 9.4–12.4)
POTASSIUM SERPL-SCNC: 4.4 MMOL/L (ref 3.5–5)
PROT SERPL-MCNC: 6.5 G/DL (ref 6.6–8.7)
PSA SERPL-MCNC: 1.66 NG/ML (ref 0–4)
RBC # BLD AUTO: 4.2 M/UL (ref 4.7–6.1)
SODIUM SERPL-SCNC: 138 MMOL/L (ref 136–145)
TRIGL SERPL-MCNC: 153 MG/DL (ref 0–149)
TSH SERPL DL<=0.005 MIU/L-ACNC: 1.03 UIU/ML (ref 0.27–4.2)
WBC # BLD AUTO: 6.5 K/UL (ref 4.8–10.8)

## 2023-09-22 PROCEDURE — 99214 OFFICE O/P EST MOD 30 MIN: CPT | Performed by: INTERNAL MEDICINE

## 2023-09-22 RX ORDER — ONDANSETRON 4 MG/1
4 TABLET, ORALLY DISINTEGRATING ORAL 3 TIMES DAILY PRN
Qty: 21 TABLET | Refills: 0 | Status: SHIPPED | OUTPATIENT
Start: 2023-09-22

## 2023-09-22 NOTE — PROGRESS NOTES
Chief Complaint   Patient presents with    Follow-up       HPI: Cory Vidal is a 52 y.o. male is here for follow-up of chronic neck pain, anxiety, tremors. He declines a flu shot. He feels like his medications are working well for chronic neck pain. Is been on these medications for a number of years, since prior to seeing me. He is always been compliant with his medications and we have not had any deviations in his care. He also takes Valium as needed for anxiety. I the Valium is also helpful for neck spasms and tremors. His tremors appear to be well controlled on his current medication regimen. He does have a history of acid reflux, which is stable on Prilosec. His allergies are well controlled on Claritin. He has had some nausea over the past couple of days and is requesting a prescription for Zofran, which we can send in today.     Past Medical History:   Diagnosis Date    Allergic rhinitis     Cellulitis     Gastroparesis     GERD (gastroesophageal reflux disease)       Past Surgical History:   Procedure Laterality Date    CHOLECYSTECTOMY      MI COLONOSCOPY FLX DX W/COLLJ SPEC WHEN PFRMD N/A 2018    Dr Ivania Urena, 10 yr recall      Social History     Socioeconomic History    Marital status:      Spouse name: None    Number of children: None    Years of education: None    Highest education level: None   Occupational History     Employer: SELF-EMPLOYED   Tobacco Use    Smoking status: Former     Packs/day: 0.50     Years: 3.00     Additional pack years: 0.00     Total pack years: 1.50     Types: Cigarettes     Quit date: 12/15/1990     Years since quittin.8    Smokeless tobacco: Never   Vaping Use    Vaping Use: Never used   Substance and Sexual Activity    Alcohol use: No    Drug use: No     Social Determinants of Health     Financial Resource Strain: Low Risk  (2023)    Overall Financial Resource Strain (CARDIA)     Difficulty of Paying Living Expenses: Not hard at all

## 2023-09-28 SDOH — ECONOMIC STABILITY: FOOD INSECURITY: WITHIN THE PAST 12 MONTHS, YOU WORRIED THAT YOUR FOOD WOULD RUN OUT BEFORE YOU GOT MONEY TO BUY MORE.: NEVER TRUE

## 2023-09-28 SDOH — ECONOMIC STABILITY: INCOME INSECURITY: HOW HARD IS IT FOR YOU TO PAY FOR THE VERY BASICS LIKE FOOD, HOUSING, MEDICAL CARE, AND HEATING?: NOT HARD AT ALL

## 2023-09-28 SDOH — ECONOMIC STABILITY: FOOD INSECURITY: WITHIN THE PAST 12 MONTHS, THE FOOD YOU BOUGHT JUST DIDN'T LAST AND YOU DIDN'T HAVE MONEY TO GET MORE.: NEVER TRUE

## 2023-09-28 ASSESSMENT — ENCOUNTER SYMPTOMS
EYE REDNESS: 0
BACK PAIN: 1
EYE DISCHARGE: 0
SORE THROAT: 0
CONSTIPATION: 0
EYE PAIN: 0
BLOOD IN STOOL: 0
COUGH: 0
VOMITING: 0
WHEEZING: 0
ABDOMINAL PAIN: 0
VOICE CHANGE: 0
ABDOMINAL DISTENTION: 0
DIARRHEA: 0
RHINORRHEA: 0
NAUSEA: 1
TROUBLE SWALLOWING: 0
COLOR CHANGE: 0
SHORTNESS OF BREATH: 0
SINUS PRESSURE: 0
PHOTOPHOBIA: 0
CHEST TIGHTNESS: 0
SINUS PAIN: 0
EYE ITCHING: 0

## 2023-09-29 DIAGNOSIS — F41.9 ANXIETY DISORDER, UNSPECIFIED TYPE: ICD-10-CM

## 2023-10-02 RX ORDER — LORAZEPAM 0.5 MG/1
TABLET ORAL
Qty: 90 TABLET | Refills: 0 | Status: SHIPPED | OUTPATIENT
Start: 2023-10-02 | End: 2023-11-02

## 2023-10-02 NOTE — TELEPHONE ENCOUNTER
Dania Celis called to request a refill on his medication. Last office visit : 9/22/2023   Next office visit : 12/28/2023     Last UDS:   Amphetamine Screen, Urine   Date Value Ref Range Status   06/19/2023 NEG  Final     Barbiturate Screen, Urine   Date Value Ref Range Status   06/19/2023 NEG  Final     Benzodiazepine Screen, Urine   Date Value Ref Range Status   06/19/2023 POS  Final     Comment:     currently taking valium and ativan     Buprenorphine Urine   Date Value Ref Range Status   06/19/2023 NEG  Final     Cocaine Metabolite Screen, Urine   Date Value Ref Range Status   06/19/2023 NEG  Final     Gabapentin Screen, Urine   Date Value Ref Range Status   06/19/2023 NEG  Final     MDMA, Urine   Date Value Ref Range Status   06/19/2023 NEG  Final     Methamphetamine, Urine   Date Value Ref Range Status   06/19/2023 NEG  Final     Opiate Scrn, Ur   Date Value Ref Range Status   06/19/2023 POS  Final     Comment:     currently taking percocet     Oxycodone Screen, Ur   Date Value Ref Range Status   06/19/2023 POS  Final     Comment:     currently taking percocet     PCP Screen, Urine   Date Value Ref Range Status   06/19/2023 NEG  Final     Propoxyphene Screen, Urine   Date Value Ref Range Status   06/19/2023 NEG  Final     THC Screen, Urine   Date Value Ref Range Status   06/19/2023 NEG  Final     Tricyclic Antidepressants, Urine   Date Value Ref Range Status   06/19/2023 NEG  Final       Last Olga Cave: 10/02/2023  Medication Contract: 06/19/2023     Requested Prescriptions     Pending Prescriptions Disp Refills    LORazepam (ATIVAN) 0.5 MG tablet [Pharmacy Med Name: LORazepam 0.5 MG TABS 0.5 Tablet] 90 tablet 1     Sig: TAKE 1 TABLET BY MOUTH EVERY 8 HOURS AS NEEDED FOR ANXIETY         Please approve or refuse this medication.    Jessenia Cheung LPN

## 2023-10-13 DIAGNOSIS — G89.29 CHRONIC LOW BACK PAIN, UNSPECIFIED BACK PAIN LATERALITY, UNSPECIFIED WHETHER SCIATICA PRESENT: ICD-10-CM

## 2023-10-13 DIAGNOSIS — M54.50 CHRONIC LOW BACK PAIN, UNSPECIFIED BACK PAIN LATERALITY, UNSPECIFIED WHETHER SCIATICA PRESENT: ICD-10-CM

## 2023-10-13 DIAGNOSIS — F41.9 ANXIETY: ICD-10-CM

## 2023-10-13 RX ORDER — DIAZEPAM 10 MG/1
TABLET ORAL
Qty: 60 TABLET | Refills: 0 | Status: SHIPPED | OUTPATIENT
Start: 2023-10-18 | End: 2023-11-18

## 2023-10-13 RX ORDER — OXYCODONE AND ACETAMINOPHEN 10; 325 MG/1; MG/1
TABLET ORAL
Qty: 120 TABLET | Refills: 0 | Status: SHIPPED | OUTPATIENT
Start: 2023-10-15 | End: 2023-11-14

## 2023-10-13 NOTE — TELEPHONE ENCOUNTER
I think that we need to consider getting him to pain management. Is getting to the point where I am afraid that PCPs will no longer be able to fill this quantity of oxycodone. I would prefer to get him to pain management before it to that point. We can refer him to the pain management doctor of his choice.

## 2023-10-13 NOTE — TELEPHONE ENCOUNTER
Cory Sheets called to request a refill on his medication.       Last office visit : 9/22/2023   Next office visit : 12/28/2023     Last UDS:   Amphetamine Screen, Urine   Date Value Ref Range Status   06/19/2023 NEG  Final     Barbiturate Screen, Urine   Date Value Ref Range Status   06/19/2023 NEG  Final     Benzodiazepine Screen, Urine   Date Value Ref Range Status   06/19/2023 POS  Final     Comment:     currently taking valium and ativan     Buprenorphine Urine   Date Value Ref Range Status   06/19/2023 NEG  Final     Cocaine Metabolite Screen, Urine   Date Value Ref Range Status   06/19/2023 NEG  Final     Gabapentin Screen, Urine   Date Value Ref Range Status   06/19/2023 NEG  Final     MDMA, Urine   Date Value Ref Range Status   06/19/2023 NEG  Final     Methamphetamine, Urine   Date Value Ref Range Status   06/19/2023 NEG  Final     Opiate Scrn, Ur   Date Value Ref Range Status   06/19/2023 POS  Final     Comment:     currently taking percocet     Oxycodone Screen, Ur   Date Value Ref Range Status   06/19/2023 POS  Final     Comment:     currently taking percocet     PCP Screen, Urine   Date Value Ref Range Status   06/19/2023 NEG  Final     Propoxyphene Screen, Urine   Date Value Ref Range Status   06/19/2023 NEG  Final     THC Screen, Urine   Date Value Ref Range Status   06/19/2023 NEG  Final     Tricyclic Antidepressants, Urine   Date Value Ref Range Status   06/19/2023 NEG  Final       Last Cindy Slate: 10/02/2023  Medication Contract: 06/19/2023     Requested Prescriptions     Pending Prescriptions Disp Refills    oxyCODONE-acetaminophen (PERCOCET)  MG per tablet [Pharmacy Med Name: OXYCOD/APAP   Tablet] 120 tablet 0     Sig: TAKE 1 TABLET BY MOUTH EVERY 6 HOURS AS NEEDED FOR PAIN 9/15/23    diazePAM (VALIUM) 10 MG tablet [Pharmacy Med Name: diazePAM 10 MG TABS 10 Tablet] 60 tablet 0     Sig: TAKE 1 TABLET BY MOUTH TWICE A DAY AS NEEDED FOR ANXIETY         Please approve or refuse this

## 2023-11-01 DIAGNOSIS — F41.9 ANXIETY DISORDER, UNSPECIFIED TYPE: ICD-10-CM

## 2023-11-01 NOTE — TELEPHONE ENCOUNTER
Eleazar ROSALES Dexter called to request a refill on his medication.      Last office visit : 9/22/2023   Next office visit : 12/28/2023     Last UDS:   Amphetamine Screen, Urine   Date Value Ref Range Status   06/19/2023 NEG  Final     Barbiturate Screen, Urine   Date Value Ref Range Status   06/19/2023 NEG  Final     Benzodiazepine Screen, Urine   Date Value Ref Range Status   06/19/2023 POS  Final     Comment:     currently taking valium and ativan     Buprenorphine Urine   Date Value Ref Range Status   06/19/2023 NEG  Final     Cocaine Metabolite Screen, Urine   Date Value Ref Range Status   06/19/2023 NEG  Final     Gabapentin Screen, Urine   Date Value Ref Range Status   06/19/2023 NEG  Final     MDMA, Urine   Date Value Ref Range Status   06/19/2023 NEG  Final     Methamphetamine, Urine   Date Value Ref Range Status   06/19/2023 NEG  Final     Opiate Scrn, Ur   Date Value Ref Range Status   06/19/2023 POS  Final     Comment:     currently taking percocet     Oxycodone Screen, Ur   Date Value Ref Range Status   06/19/2023 POS  Final     Comment:     currently taking percocet     PCP Screen, Urine   Date Value Ref Range Status   06/19/2023 NEG  Final     Propoxyphene Screen, Urine   Date Value Ref Range Status   06/19/2023 NEG  Final     THC Screen, Urine   Date Value Ref Range Status   06/19/2023 NEG  Final     Tricyclic Antidepressants, Urine   Date Value Ref Range Status   06/19/2023 NEG  Final       Last Bryan: 10/02/2023  Medication Contract: 06/19/2023     Requested Prescriptions     Pending Prescriptions Disp Refills    LORazepam (ATIVAN) 0.5 MG tablet [Pharmacy Med Name: LORAZEPAM 0.5MG TABLET] 90 tablet 0     Sig: TAKE ONE (1) TABLET BY MOUTH EVERY 8 HOURS AS NEEDED FOR ANXIETY         Please approve or refuse this medication.   Salina Pimentel LPN

## 2023-11-02 RX ORDER — LORAZEPAM 0.5 MG/1
TABLET ORAL
Qty: 90 TABLET | Refills: 1 | Status: SHIPPED | OUTPATIENT
Start: 2023-11-02 | End: 2023-12-28 | Stop reason: SDUPTHER

## 2023-11-02 RX ORDER — NALOXONE HYDROCHLORIDE 4 MG/.1ML
1 SPRAY NASAL PRN
Qty: 1 EACH | Refills: 3 | Status: SHIPPED | OUTPATIENT
Start: 2023-11-02

## 2023-11-08 DIAGNOSIS — K21.9 GASTROESOPHAGEAL REFLUX DISEASE WITHOUT ESOPHAGITIS: ICD-10-CM

## 2023-11-08 DIAGNOSIS — G25.0 ESSENTIAL TREMOR: ICD-10-CM

## 2023-11-08 DIAGNOSIS — F41.9 ANXIETY: ICD-10-CM

## 2023-11-08 RX ORDER — PROPRANOLOL HYDROCHLORIDE 60 MG/1
TABLET ORAL
Qty: 60 TABLET | Refills: 1 | Status: SHIPPED | OUTPATIENT
Start: 2023-11-08 | End: 2024-01-10

## 2023-11-08 RX ORDER — DIAZEPAM 10 MG/1
TABLET ORAL
Qty: 60 TABLET | Refills: 0 | Status: SHIPPED | OUTPATIENT
Start: 2023-11-15 | End: 2023-12-05

## 2023-11-08 RX ORDER — PROMETHAZINE HYDROCHLORIDE 25 MG/1
TABLET ORAL
Qty: 90 TABLET | Refills: 0 | Status: SHIPPED | OUTPATIENT
Start: 2023-11-08 | End: 2023-12-05

## 2023-11-08 NOTE — TELEPHONE ENCOUNTER
Eleazar ROSALES Dexter called to request a refill on his medication.      Last office visit : 9/22/2023   Next office visit : 12/28/2023     Last UDS:   Amphetamine Screen, Urine   Date Value Ref Range Status   06/19/2023 NEG  Final     Barbiturate Screen, Urine   Date Value Ref Range Status   06/19/2023 NEG  Final     Benzodiazepine Screen, Urine   Date Value Ref Range Status   06/19/2023 POS  Final     Comment:     currently taking valium and ativan     Buprenorphine Urine   Date Value Ref Range Status   06/19/2023 NEG  Final     Cocaine Metabolite Screen, Urine   Date Value Ref Range Status   06/19/2023 NEG  Final     Gabapentin Screen, Urine   Date Value Ref Range Status   06/19/2023 NEG  Final     MDMA, Urine   Date Value Ref Range Status   06/19/2023 NEG  Final     Methamphetamine, Urine   Date Value Ref Range Status   06/19/2023 NEG  Final     Opiate Scrn, Ur   Date Value Ref Range Status   06/19/2023 POS  Final     Comment:     currently taking percocet     Oxycodone Screen, Ur   Date Value Ref Range Status   06/19/2023 POS  Final     Comment:     currently taking percocet     PCP Screen, Urine   Date Value Ref Range Status   06/19/2023 NEG  Final     Propoxyphene Screen, Urine   Date Value Ref Range Status   06/19/2023 NEG  Final     THC Screen, Urine   Date Value Ref Range Status   06/19/2023 NEG  Final     Tricyclic Antidepressants, Urine   Date Value Ref Range Status   06/19/2023 NEG  Final       Last Bryan: 10/02/2023  Medication Contract: 06/19/2023     Requested Prescriptions     Pending Prescriptions Disp Refills    promethazine (PHENERGAN) 25 MG tablet [Pharmacy Med Name: PROMETHAZINE HYDROCHLORIDE 25MG TABLET] 90 tablet 0     Sig: TAKE ONE (1) TABLET BY MOUTH EVERY SIX (6) HOURS AS NEEDED FOR NAUSEA    propranolol (INDERAL) 60 MG tablet [Pharmacy Med Name: PROPRANOLOL HCL 60MG TABLET] 60 tablet 1     Sig: TAKE ONE (1) TABLET BY MOUTH TWO (2) TIMES DAILY    diazePAM (VALIUM) 10 MG tablet [Pharmacy Med

## 2023-11-20 DIAGNOSIS — M54.50 CHRONIC LOW BACK PAIN, UNSPECIFIED BACK PAIN LATERALITY, UNSPECIFIED WHETHER SCIATICA PRESENT: Primary | ICD-10-CM

## 2023-11-20 DIAGNOSIS — G89.29 CHRONIC LOW BACK PAIN, UNSPECIFIED BACK PAIN LATERALITY, UNSPECIFIED WHETHER SCIATICA PRESENT: Primary | ICD-10-CM

## 2023-11-20 RX ORDER — OXYCODONE AND ACETAMINOPHEN 10; 325 MG/1; MG/1
TABLET ORAL
Qty: 120 TABLET | Refills: 0 | Status: SHIPPED | OUTPATIENT
Start: 2023-11-20 | End: 2023-12-20

## 2023-11-20 NOTE — TELEPHONE ENCOUNTER
Siddharth Moore called to request a refill on his medication. Last office visit : 9/22/2023   Next office visit : 12/28/2023     Last UDS:   Amphetamine Screen, Urine   Date Value Ref Range Status   06/19/2023 NEG  Final     Barbiturate Screen, Urine   Date Value Ref Range Status   06/19/2023 NEG  Final     Benzodiazepine Screen, Urine   Date Value Ref Range Status   06/19/2023 POS  Final     Comment:     currently taking valium and ativan     Buprenorphine Urine   Date Value Ref Range Status   06/19/2023 NEG  Final     Cocaine Metabolite Screen, Urine   Date Value Ref Range Status   06/19/2023 NEG  Final     Gabapentin Screen, Urine   Date Value Ref Range Status   06/19/2023 NEG  Final     MDMA, Urine   Date Value Ref Range Status   06/19/2023 NEG  Final     Methamphetamine, Urine   Date Value Ref Range Status   06/19/2023 NEG  Final     Opiate Scrn, Ur   Date Value Ref Range Status   06/19/2023 POS  Final     Comment:     currently taking percocet     Oxycodone Screen, Ur   Date Value Ref Range Status   06/19/2023 POS  Final     Comment:     currently taking percocet     PCP Screen, Urine   Date Value Ref Range Status   06/19/2023 NEG  Final     Propoxyphene Screen, Urine   Date Value Ref Range Status   06/19/2023 NEG  Final     THC Screen, Urine   Date Value Ref Range Status   06/19/2023 NEG  Final     Tricyclic Antidepressants, Urine   Date Value Ref Range Status   06/19/2023 NEG  Final       Last Lattie Barbone: 10/02/2023  Medication Contract: 06/19/2023     Requested Prescriptions     Pending Prescriptions Disp Refills    oxyCODONE-acetaminophen (PERCOCET)  MG per tablet [Pharmacy Med Name: OXYCODONE/ACETAMINOPHEN 10-325MG TABLET] 120 tablet 0     Sig: TAKE ONE (1) TABLET BY MOUTH EVERY SIX (6) HOURS AS NEEDED FOR PAIN 9/15/23         Please approve or refuse this medication.    Naye La LPN

## 2023-11-29 DIAGNOSIS — F41.9 ANXIETY DISORDER, UNSPECIFIED TYPE: ICD-10-CM

## 2023-11-29 NOTE — TELEPHONE ENCOUNTER
Eleazar ROSALES Dexter called to request a refill on his medication.      Last office visit : 9/22/2023   Next office visit : 12/28/2023     Last UDS:   Amphetamine Screen, Urine   Date Value Ref Range Status   06/19/2023 NEG  Final     Barbiturate Screen, Urine   Date Value Ref Range Status   06/19/2023 NEG  Final     Benzodiazepine Screen, Urine   Date Value Ref Range Status   06/19/2023 POS  Final     Comment:     currently taking valium and ativan     Buprenorphine Urine   Date Value Ref Range Status   06/19/2023 NEG  Final     Cocaine Metabolite Screen, Urine   Date Value Ref Range Status   06/19/2023 NEG  Final     Gabapentin Screen, Urine   Date Value Ref Range Status   06/19/2023 NEG  Final     MDMA, Urine   Date Value Ref Range Status   06/19/2023 NEG  Final     Methamphetamine, Urine   Date Value Ref Range Status   06/19/2023 NEG  Final     Opiate Scrn, Ur   Date Value Ref Range Status   06/19/2023 POS  Final     Comment:     currently taking percocet     Oxycodone Screen, Ur   Date Value Ref Range Status   06/19/2023 POS  Final     Comment:     currently taking percocet     PCP Screen, Urine   Date Value Ref Range Status   06/19/2023 NEG  Final     Propoxyphene Screen, Urine   Date Value Ref Range Status   06/19/2023 NEG  Final     THC Screen, Urine   Date Value Ref Range Status   06/19/2023 NEG  Final     Tricyclic Antidepressants, Urine   Date Value Ref Range Status   06/19/2023 NEG  Final       Last Bryan: 10/02/2023  Medication Contract: 06/19/2023     Requested Prescriptions     Pending Prescriptions Disp Refills    LORazepam (ATIVAN) 0.5 MG tablet [Pharmacy Med Name: LORAZEPAM 0.5MG TABLET] 90 tablet 1     Sig: TAKE ONE (1) TABLET BY MOUTH EVERY 8 HOURS AS NEEDED FOR ANXIETY         Please approve or refuse this medication.   Salina Pimentel LPN

## 2023-11-30 DIAGNOSIS — F41.9 ANXIETY DISORDER, UNSPECIFIED TYPE: ICD-10-CM

## 2023-11-30 RX ORDER — LORAZEPAM 0.5 MG/1
TABLET ORAL
Qty: 90 TABLET | Refills: 1 | OUTPATIENT
Start: 2023-11-30

## 2023-12-05 DIAGNOSIS — K21.9 GASTROESOPHAGEAL REFLUX DISEASE WITHOUT ESOPHAGITIS: ICD-10-CM

## 2023-12-05 DIAGNOSIS — F41.9 ANXIETY: ICD-10-CM

## 2023-12-05 RX ORDER — PROMETHAZINE HYDROCHLORIDE 25 MG/1
TABLET ORAL
Qty: 90 TABLET | Refills: 0 | Status: SHIPPED | OUTPATIENT
Start: 2023-12-05 | End: 2024-01-10

## 2023-12-05 RX ORDER — DIAZEPAM 10 MG/1
TABLET ORAL
Qty: 60 TABLET | Refills: 0 | Status: SHIPPED | OUTPATIENT
Start: 2023-12-13 | End: 2024-01-10

## 2023-12-28 ENCOUNTER — OFFICE VISIT (OUTPATIENT)
Dept: INTERNAL MEDICINE | Age: 53
End: 2023-12-28

## 2023-12-28 VITALS
WEIGHT: 174 LBS | BODY MASS INDEX: 23.57 KG/M2 | OXYGEN SATURATION: 96 % | DIASTOLIC BLOOD PRESSURE: 64 MMHG | RESPIRATION RATE: 18 BRPM | HEART RATE: 64 BPM | SYSTOLIC BLOOD PRESSURE: 120 MMHG | HEIGHT: 72 IN

## 2023-12-28 DIAGNOSIS — M54.9 CHRONIC BACK PAIN, UNSPECIFIED BACK LOCATION, UNSPECIFIED BACK PAIN LATERALITY: ICD-10-CM

## 2023-12-28 DIAGNOSIS — R25.1 TREMOR: ICD-10-CM

## 2023-12-28 DIAGNOSIS — M54.2 NECK PAIN: ICD-10-CM

## 2023-12-28 DIAGNOSIS — D50.9 IRON DEFICIENCY ANEMIA, UNSPECIFIED IRON DEFICIENCY ANEMIA TYPE: ICD-10-CM

## 2023-12-28 DIAGNOSIS — F41.9 ANXIETY DISORDER, UNSPECIFIED TYPE: ICD-10-CM

## 2023-12-28 DIAGNOSIS — G25.0 ESSENTIAL TREMOR: ICD-10-CM

## 2023-12-28 DIAGNOSIS — R53.83 OTHER FATIGUE: ICD-10-CM

## 2023-12-28 DIAGNOSIS — D50.9 IRON DEFICIENCY ANEMIA, UNSPECIFIED IRON DEFICIENCY ANEMIA TYPE: Primary | ICD-10-CM

## 2023-12-28 DIAGNOSIS — Z00.00 ROUTINE ADULT HEALTH MAINTENANCE: Primary | ICD-10-CM

## 2023-12-28 DIAGNOSIS — G89.29 CHRONIC BACK PAIN, UNSPECIFIED BACK LOCATION, UNSPECIFIED BACK PAIN LATERALITY: ICD-10-CM

## 2023-12-28 DIAGNOSIS — R11.0 NAUSEA: ICD-10-CM

## 2023-12-28 LAB
ALBUMIN SERPL-MCNC: 3.9 G/DL (ref 3.5–5.2)
ALCOHOL URINE: NORMAL
ALP SERPL-CCNC: 58 U/L (ref 40–130)
ALT SERPL-CCNC: 21 U/L (ref 5–41)
AMPHETAMINE SCREEN, URINE: NORMAL
ANION GAP SERPL CALCULATED.3IONS-SCNC: 10 MMOL/L (ref 7–19)
AST SERPL-CCNC: 29 U/L (ref 5–40)
BARBITURATE SCREEN, URINE: NORMAL
BASOPHILS # BLD: 0.1 K/UL (ref 0–0.2)
BASOPHILS NFR BLD: 1 % (ref 0–1)
BENZODIAZEPINE SCREEN, URINE: NORMAL
BILIRUB SERPL-MCNC: <0.2 MG/DL (ref 0.2–1.2)
BUN SERPL-MCNC: 22 MG/DL (ref 6–20)
BUPRENORPHINE URINE: NORMAL
CALCIUM SERPL-MCNC: 9 MG/DL (ref 8.6–10)
CHLORIDE SERPL-SCNC: 103 MMOL/L (ref 98–111)
CHOLEST SERPL-MCNC: 130 MG/DL (ref 160–199)
CO2 SERPL-SCNC: 28 MMOL/L (ref 22–29)
COCAINE METABOLITE SCREEN URINE: NORMAL
CREAT SERPL-MCNC: 1 MG/DL (ref 0.5–1.2)
EOSINOPHIL # BLD: 0.3 K/UL (ref 0–0.6)
EOSINOPHIL NFR BLD: 4.7 % (ref 0–5)
ERYTHROCYTE [DISTWIDTH] IN BLOOD BY AUTOMATED COUNT: 13.5 % (ref 11.5–14.5)
FENTANYL SCREEN, URINE: NORMAL
GABAPENTIN SCREEN, URINE: NORMAL
GLUCOSE SERPL-MCNC: 112 MG/DL (ref 74–109)
HBA1C MFR BLD: 5.6 % (ref 4–6)
HCT VFR BLD AUTO: 35.8 % (ref 42–52)
HDLC SERPL-MCNC: 48 MG/DL (ref 55–121)
HGB BLD-MCNC: 11.3 G/DL (ref 14–18)
IMM GRANULOCYTES # BLD: 0 K/UL
LDLC SERPL CALC-MCNC: 51 MG/DL
LYMPHOCYTES # BLD: 1.4 K/UL (ref 1.1–4.5)
LYMPHOCYTES NFR BLD: 25 % (ref 20–40)
MCH RBC QN AUTO: 30.2 PG (ref 27–31)
MCHC RBC AUTO-ENTMCNC: 31.6 G/DL (ref 33–37)
MCV RBC AUTO: 95.7 FL (ref 80–94)
MDMA URINE: NORMAL
METHADONE SCREEN, URINE: NORMAL
METHAMPHETAMINE, URINE: NORMAL
MONOCYTES # BLD: 0.5 K/UL (ref 0–0.9)
MONOCYTES NFR BLD: 9 % (ref 0–10)
NEUTROPHILS # BLD: 3.5 K/UL (ref 1.5–7.5)
NEUTS SEG NFR BLD: 60.1 % (ref 50–65)
OPIATE SCREEN URINE: NORMAL
OXYCODONE SCREEN URINE: NORMAL
PHENCYCLIDINE SCREEN URINE: NORMAL
PLATELET # BLD AUTO: 227 K/UL (ref 130–400)
PMV BLD AUTO: 9.6 FL (ref 9.4–12.4)
POTASSIUM SERPL-SCNC: 4.2 MMOL/L (ref 3.5–5)
PROPOXYPHENE SCREEN, URINE: NORMAL
PROT SERPL-MCNC: 6.4 G/DL (ref 6.6–8.7)
RBC # BLD AUTO: 3.74 M/UL (ref 4.7–6.1)
SODIUM SERPL-SCNC: 141 MMOL/L (ref 136–145)
SYNTHETIC CANNABINOIDS(K2) SCREEN, URINE: NORMAL
THC SCREEN, URINE: NORMAL
TRAMADOL SCREEN URINE: NORMAL
TRICYCLIC ANTIDEPRESSANTS, UR: NORMAL
TRIGL SERPL-MCNC: 153 MG/DL (ref 0–149)
TSH SERPL DL<=0.005 MIU/L-ACNC: 1.82 UIU/ML (ref 0.27–4.2)
WBC # BLD AUTO: 5.8 K/UL (ref 4.8–10.8)

## 2023-12-28 PROCEDURE — 99396 PREV VISIT EST AGE 40-64: CPT | Performed by: INTERNAL MEDICINE

## 2023-12-28 RX ORDER — LORAZEPAM 0.5 MG/1
TABLET ORAL
Qty: 90 TABLET | Refills: 0 | Status: SHIPPED | OUTPATIENT
Start: 2023-12-28 | End: 2024-01-27

## 2023-12-28 RX ORDER — LORAZEPAM 0.5 MG/1
TABLET ORAL
Qty: 90 TABLET | Refills: 1 | Status: CANCELLED | OUTPATIENT
Start: 2023-12-28 | End: 2024-01-27

## 2023-12-28 NOTE — PROGRESS NOTES
Chief Complaint   Patient presents with    Annual Exam    3 Month Follow-Up       HPI: Ginette Musa is a 52 y.o. male is here for his annual physical exam.  His functional status is good. He denies any history of falls. He has no concerns in regards to safety, hearing, or cognition. He does not see any other healthcare providers on a regular basis. His anxiety is well-controlled on Ativan and Valium. He has been on these medications for a number of years, prior to seeing me. He has always been compliant with his medication regimen and we have not had any deviations in terms of his drug screens or Bryan's. He does have a history of chronic neck and back pain. Valium is also helpful for neck spasms. Percocet is helpful for the back and neck pain. She also has a history of tremors to his hands. The Valium is also helpful for the tremors. He is aware of the risk of taking his medications long-term including sedation, overdose, addiction and death. However, the medication regimen does work well for him. I did offer him referral to psychiatry and pain management. However, at this time, he declines it. He is concerned that being fatigued. He would like to have his testosterone level checked. I did explain to him that low testosterone may be a possibility secondary to the medication that he is taking. We will check a testosterone level. Propranolol is helpful for tremors. His acid reflux is well-controlled. His allergies are stable his current medication regimen. He states overall, he is doing well has no major concerns or complaints.     Routine screening is as follows:  Eye exam yearly  Flu vaccine declined  PSA yearly  Colonoscopy 2018  Pneumovax up to date    Past Medical History:   Diagnosis Date    Allergic rhinitis     Cellulitis     Gastroparesis     GERD (gastroesophageal reflux disease)       Past Surgical History:   Procedure Laterality Date    CHOLECYSTECTOMY      NE COLONOSCOPY

## 2024-01-09 DIAGNOSIS — G25.0 ESSENTIAL TREMOR: ICD-10-CM

## 2024-01-09 DIAGNOSIS — K21.9 GASTROESOPHAGEAL REFLUX DISEASE WITHOUT ESOPHAGITIS: ICD-10-CM

## 2024-01-09 DIAGNOSIS — F41.9 ANXIETY: ICD-10-CM

## 2024-01-10 RX ORDER — DIAZEPAM 10 MG/1
TABLET ORAL
Qty: 60 TABLET | Refills: 0 | Status: SHIPPED | OUTPATIENT
Start: 2024-01-12 | End: 2024-02-12

## 2024-01-10 RX ORDER — PROPRANOLOL HYDROCHLORIDE 60 MG/1
TABLET ORAL
Qty: 60 TABLET | Refills: 1 | Status: SHIPPED | OUTPATIENT
Start: 2024-01-10

## 2024-01-10 RX ORDER — PROMETHAZINE HYDROCHLORIDE 25 MG/1
TABLET ORAL
Qty: 90 TABLET | Refills: 0 | Status: SHIPPED | OUTPATIENT
Start: 2024-01-10

## 2024-01-10 NOTE — TELEPHONE ENCOUNTER
Eleazar ROSALES Dexter called to request a refill on his medication.      Last office visit : 12/28/2023   Next office visit : 3/28/2024     Last UDS:   Amphetamine Screen, Urine   Date Value Ref Range Status   12/28/2023 NEG  Final     Barbiturate Screen, Urine   Date Value Ref Range Status   12/28/2023 NEG  Final     Benzodiazepine Screen, Urine   Date Value Ref Range Status   12/28/2023 POS  Final     Comment:     PT TAKES ATIVAN     Buprenorphine Urine   Date Value Ref Range Status   12/28/2023 NEG  Final     Cocaine Metabolite Screen, Urine   Date Value Ref Range Status   12/28/2023 NEG  Final     Gabapentin Screen, Urine   Date Value Ref Range Status   12/28/2023 N/A  Final     MDMA, Urine   Date Value Ref Range Status   12/28/2023 NEG  Final     Methamphetamine, Urine   Date Value Ref Range Status   12/28/2023 NEG  Final     Opiate Scrn, Ur   Date Value Ref Range Status   12/28/2023 POS  Final     Comment:     PT TAKES PERCOCET     Oxycodone Screen, Ur   Date Value Ref Range Status   12/28/2023 POS  Final     Comment:     PT TAKES PERCOCET     PCP Screen, Urine   Date Value Ref Range Status   12/28/2023 N/A  Final     Propoxyphene Screen, Urine   Date Value Ref Range Status   12/28/2023 N/A  Final     THC Screen, Urine   Date Value Ref Range Status   12/28/2023 NEG  Final     Tricyclic Antidepressants, Urine   Date Value Ref Range Status   06/19/2023 NEG  Final       Last Bryan: 12/28/2023  Medication Contract: 06/19/2023     Requested Prescriptions     Pending Prescriptions Disp Refills    promethazine (PHENERGAN) 25 MG tablet [Pharmacy Med Name: PROMETHAZINE HYDROCHLORIDE 25MG TABLET] 90 tablet 0     Sig: TAKE ONE (1) TABLET BY MOUTH EVERY SIX (6) HOURS AS NEEDED FOR NAUSEA    diazePAM (VALIUM) 10 MG tablet [Pharmacy Med Name: DIAZEPAM 10MG TABLET] 60 tablet 0     Sig: TAKE ONE (1) TABLET BY MOUTH TWICE A DAY AS NEEDED FOR ANXIETY    propranolol (INDERAL) 60 MG tablet [Pharmacy Med Name: PROPRANOLOL HCL 60MG

## 2024-01-18 DIAGNOSIS — M54.50 CHRONIC LOW BACK PAIN, UNSPECIFIED BACK PAIN LATERALITY, UNSPECIFIED WHETHER SCIATICA PRESENT: ICD-10-CM

## 2024-01-18 DIAGNOSIS — G89.29 CHRONIC LOW BACK PAIN, UNSPECIFIED BACK PAIN LATERALITY, UNSPECIFIED WHETHER SCIATICA PRESENT: ICD-10-CM

## 2024-01-18 RX ORDER — OXYCODONE AND ACETAMINOPHEN 10; 325 MG/1; MG/1
1 TABLET ORAL EVERY 6 HOURS PRN
Qty: 120 TABLET | Refills: 0 | Status: SHIPPED | OUTPATIENT
Start: 2024-01-19 | End: 2024-02-18

## 2024-01-18 NOTE — TELEPHONE ENCOUNTER
Eleazar Renteria called to request a refill on his medication.      Last office visit : 12/28/2023   Next office visit : 3/28/2024     Last UDS:   Amphetamine Screen, Urine   Date Value Ref Range Status   12/28/2023 NEG  Final     Barbiturate Screen, Urine   Date Value Ref Range Status   12/28/2023 NEG  Final     Benzodiazepine Screen, Urine   Date Value Ref Range Status   12/28/2023 POS  Final     Comment:     PT TAKES ATIVAN     Buprenorphine Urine   Date Value Ref Range Status   12/28/2023 NEG  Final     Cocaine Metabolite Screen, Urine   Date Value Ref Range Status   12/28/2023 NEG  Final     Gabapentin Screen, Urine   Date Value Ref Range Status   12/28/2023 N/A  Final     MDMA, Urine   Date Value Ref Range Status   12/28/2023 NEG  Final     Methamphetamine, Urine   Date Value Ref Range Status   12/28/2023 NEG  Final     Opiate Scrn, Ur   Date Value Ref Range Status   12/28/2023 POS  Final     Comment:     PT TAKES PERCOCET     Oxycodone Screen, Ur   Date Value Ref Range Status   12/28/2023 POS  Final     Comment:     PT TAKES PERCOCET     PCP Screen, Urine   Date Value Ref Range Status   12/28/2023 N/A  Final     Propoxyphene Screen, Urine   Date Value Ref Range Status   12/28/2023 N/A  Final     THC Screen, Urine   Date Value Ref Range Status   12/28/2023 NEG  Final     Tricyclic Antidepressants, Urine   Date Value Ref Range Status   06/19/2023 NEG  Final       Last Bryan: 01/10/2024  Medication Contract: 06/19/2023     Requested Prescriptions     Pending Prescriptions Disp Refills    oxyCODONE-acetaminophen (PERCOCET)  MG per tablet [Pharmacy Med Name: OXYCODONE/ACETAMINOPHEN 10-325MG TABLET] 120 tablet 0     Sig: TAKE ONE (1) TABLET BY MOUTH EVERY SIX (6) HOURS AS NEEDED FOR PAIN FOR UP TO 30 DAYS. MAX DAILY AMOUNT: FOUR (4) TABLETS         Please approve or refuse this medication.   Salina Pimentel, WANDAN

## 2024-01-29 DIAGNOSIS — F41.9 ANXIETY DISORDER, UNSPECIFIED TYPE: ICD-10-CM

## 2024-01-29 RX ORDER — LORAZEPAM 0.5 MG/1
TABLET ORAL
Qty: 90 TABLET | Refills: 0 | Status: SHIPPED | OUTPATIENT
Start: 2024-01-29 | End: 2024-01-29

## 2024-01-29 NOTE — TELEPHONE ENCOUNTER
Eleazar ROSALES Dexter called to request a refill on his medication.      Last office visit : 12/28/2023   Next office visit : 3/28/2024     Last UDS:   Amphetamine Screen, Urine   Date Value Ref Range Status   12/28/2023 NEG  Final     Barbiturate Screen, Urine   Date Value Ref Range Status   12/28/2023 NEG  Final     Benzodiazepine Screen, Urine   Date Value Ref Range Status   12/28/2023 POS  Final     Comment:     PT TAKES ATIVAN     Buprenorphine Urine   Date Value Ref Range Status   12/28/2023 NEG  Final     Cocaine Metabolite Screen, Urine   Date Value Ref Range Status   12/28/2023 NEG  Final     Gabapentin Screen, Urine   Date Value Ref Range Status   12/28/2023 N/A  Final     MDMA, Urine   Date Value Ref Range Status   12/28/2023 NEG  Final     Methamphetamine, Urine   Date Value Ref Range Status   12/28/2023 NEG  Final     Opiate Scrn, Ur   Date Value Ref Range Status   12/28/2023 POS  Final     Comment:     PT TAKES PERCOCET     Oxycodone Screen, Ur   Date Value Ref Range Status   12/28/2023 POS  Final     Comment:     PT TAKES PERCOCET     PCP Screen, Urine   Date Value Ref Range Status   12/28/2023 N/A  Final     Propoxyphene Screen, Urine   Date Value Ref Range Status   12/28/2023 N/A  Final     THC Screen, Urine   Date Value Ref Range Status   12/28/2023 NEG  Final     Tricyclic Antidepressants, Urine   Date Value Ref Range Status   06/19/2023 NEG  Final       Last Bryan: 1/18/2024  Medication Contract: 06/19/2023     Requested Prescriptions     Pending Prescriptions Disp Refills    LORazepam (ATIVAN) 0.5 MG tablet [Pharmacy Med Name: LORAZEPAM 0.5MG TABLET] 90 tablet 0     Sig: TAKE ONE (1) TABLET BY MOUTH EVERY 8 HOURS AS NEEDED FOR ANXIETY         Please approve or refuse this medication.   Salina Pimentel, WANDAN

## 2024-02-01 DIAGNOSIS — K21.9 GASTROESOPHAGEAL REFLUX DISEASE WITHOUT ESOPHAGITIS: ICD-10-CM

## 2024-02-01 DIAGNOSIS — G25.0 ESSENTIAL TREMOR: ICD-10-CM

## 2024-02-01 RX ORDER — PROPRANOLOL HYDROCHLORIDE 60 MG/1
TABLET ORAL
Qty: 60 TABLET | Refills: 4 | Status: SHIPPED | OUTPATIENT
Start: 2024-02-01

## 2024-02-01 RX ORDER — PROMETHAZINE HYDROCHLORIDE 25 MG/1
TABLET ORAL
Qty: 90 TABLET | Refills: 4 | Status: SHIPPED | OUTPATIENT
Start: 2024-02-01

## 2024-02-01 NOTE — TELEPHONE ENCOUNTER
Eleazar called requesting a refill of the below medication which has been pended for you:     Requested Prescriptions     Pending Prescriptions Disp Refills    promethazine (PHENERGAN) 25 MG tablet [Pharmacy Med Name: PROMETHAZINE HYDROCHLORIDE 25MG TABLET] 90 tablet 4     Sig: TAKE ONE (1) TABLET BY MOUTH EVERY SIX (6) HOURS AS NEEDED FOR NAUSEA    propranolol (INDERAL) 60 MG tablet [Pharmacy Med Name: PROPRANOLOL HCL 60MG TABLET] 60 tablet 4     Sig: TAKE ONE (1) TABLET BY MOUTH TWO (2) TIMES DAILY       Last Appointment Date: 12/28/2023  Next Appointment Date: 3/28/2024    No Known Allergies

## 2024-02-05 DIAGNOSIS — F41.9 ANXIETY: ICD-10-CM

## 2024-02-05 RX ORDER — DIAZEPAM 10 MG/1
TABLET ORAL
Qty: 60 TABLET | Refills: 0 | Status: SHIPPED | OUTPATIENT
Start: 2024-02-11 | End: 2024-03-10

## 2024-02-05 NOTE — TELEPHONE ENCOUNTER
Eleazar ROSALES Dexter called to request a refill on his medication.      Last office visit : 12/28/2023   Next office visit : 3/28/2024     Last UDS:   Amphetamine Screen, Urine   Date Value Ref Range Status   12/28/2023 NEG  Final     Barbiturate Screen, Urine   Date Value Ref Range Status   12/28/2023 NEG  Final     Benzodiazepine Screen, Urine   Date Value Ref Range Status   12/28/2023 POS  Final     Comment:     PT TAKES ATIVAN     Buprenorphine Urine   Date Value Ref Range Status   12/28/2023 NEG  Final     Cocaine Metabolite Screen, Urine   Date Value Ref Range Status   12/28/2023 NEG  Final     Gabapentin Screen, Urine   Date Value Ref Range Status   12/28/2023 N/A  Final     MDMA, Urine   Date Value Ref Range Status   12/28/2023 NEG  Final     Methamphetamine, Urine   Date Value Ref Range Status   12/28/2023 NEG  Final     Opiate Scrn, Ur   Date Value Ref Range Status   12/28/2023 POS  Final     Comment:     PT TAKES PERCOCET     Oxycodone Screen, Ur   Date Value Ref Range Status   12/28/2023 POS  Final     Comment:     PT TAKES PERCOCET     PCP Screen, Urine   Date Value Ref Range Status   12/28/2023 N/A  Final     Propoxyphene Screen, Urine   Date Value Ref Range Status   12/28/2023 N/A  Final     THC Screen, Urine   Date Value Ref Range Status   12/28/2023 NEG  Final     Tricyclic Antidepressants, Urine   Date Value Ref Range Status   06/19/2023 NEG  Final       Last Bryan: 1/29/2024  Medication Contract: 06/19/2023     Requested Prescriptions     Pending Prescriptions Disp Refills    diazePAM (VALIUM) 10 MG tablet [Pharmacy Med Name: DIAZEPAM 10MG TABLET] 60 tablet 0     Sig: TAKE ONE (1) TABLET BY MOUTH TWICE A DAY AS NEEDED FOR ANXIETY         Please approve or refuse this medication.   Salina Pimentel LPN

## 2024-02-14 DIAGNOSIS — G89.29 CHRONIC LOW BACK PAIN, UNSPECIFIED BACK PAIN LATERALITY, UNSPECIFIED WHETHER SCIATICA PRESENT: ICD-10-CM

## 2024-02-14 DIAGNOSIS — M54.50 CHRONIC LOW BACK PAIN, UNSPECIFIED BACK PAIN LATERALITY, UNSPECIFIED WHETHER SCIATICA PRESENT: ICD-10-CM

## 2024-02-14 RX ORDER — OXYCODONE AND ACETAMINOPHEN 10; 325 MG/1; MG/1
TABLET ORAL
Qty: 120 TABLET | Refills: 0 | Status: SHIPPED | OUTPATIENT
Start: 2024-02-18 | End: 2024-03-19

## 2024-02-14 NOTE — TELEPHONE ENCOUNTER
Eleazar Renteria called to request a refill on his medication.      Last office visit : 12/28/2023   Next office visit : 3/28/2024     Last UDS:   Amphetamine Screen, Urine   Date Value Ref Range Status   12/28/2023 NEG  Final     Barbiturate Screen, Urine   Date Value Ref Range Status   12/28/2023 NEG  Final     Benzodiazepine Screen, Urine   Date Value Ref Range Status   12/28/2023 POS  Final     Comment:     PT TAKES ATIVAN     Buprenorphine Urine   Date Value Ref Range Status   12/28/2023 NEG  Final     Cocaine Metabolite Screen, Urine   Date Value Ref Range Status   12/28/2023 NEG  Final     Gabapentin Screen, Urine   Date Value Ref Range Status   12/28/2023 N/A  Final     MDMA, Urine   Date Value Ref Range Status   12/28/2023 NEG  Final     Methamphetamine, Urine   Date Value Ref Range Status   12/28/2023 NEG  Final     Opiate Scrn, Ur   Date Value Ref Range Status   12/28/2023 POS  Final     Comment:     PT TAKES PERCOCET     Oxycodone Screen, Ur   Date Value Ref Range Status   12/28/2023 POS  Final     Comment:     PT TAKES PERCOCET     PCP Screen, Urine   Date Value Ref Range Status   12/28/2023 N/A  Final     Propoxyphene Screen, Urine   Date Value Ref Range Status   12/28/2023 N/A  Final     THC Screen, Urine   Date Value Ref Range Status   12/28/2023 NEG  Final     Tricyclic Antidepressants, Urine   Date Value Ref Range Status   06/19/2023 NEG  Final       Last Bryan: 02/05/2024  Medication Contract: 06/19/2023     Requested Prescriptions     Pending Prescriptions Disp Refills    oxyCODONE-acetaminophen (PERCOCET)  MG per tablet [Pharmacy Med Name: OXYCODONE/ACETAMINOPHEN 10-325MG TABLET] 120 tablet 0     Sig: TAKE ONE (1) TABLET BY MOUTH EVERY SIX (6) HOURS AS NEEDED FOR PAIN FOR UP TO 30 DAYS. MAX DAILY AMOUNT: FOUR (4) TABLETS         Please approve or refuse this medication.   Salina Pimentel, WANDAN

## 2024-02-27 DIAGNOSIS — F41.9 ANXIETY DISORDER, UNSPECIFIED TYPE: ICD-10-CM

## 2024-02-27 RX ORDER — LORAZEPAM 0.5 MG/1
TABLET ORAL
Qty: 90 TABLET | Refills: 0 | Status: SHIPPED | OUTPATIENT
Start: 2024-02-28 | End: 2024-03-28

## 2024-02-27 NOTE — TELEPHONE ENCOUNTER
Eleazar ROSALES Dexter called to request a refill on his medication.      Last office visit : 12/28/2023   Next office visit : 3/28/2024     Last UDS:   Amphetamine Screen, Urine   Date Value Ref Range Status   12/28/2023 NEG  Final     Barbiturate Screen, Urine   Date Value Ref Range Status   12/28/2023 NEG  Final     Benzodiazepine Screen, Urine   Date Value Ref Range Status   12/28/2023 POS  Final     Comment:     PT TAKES ATIVAN     Buprenorphine Urine   Date Value Ref Range Status   12/28/2023 NEG  Final     Cocaine Metabolite Screen, Urine   Date Value Ref Range Status   12/28/2023 NEG  Final     Gabapentin Screen, Urine   Date Value Ref Range Status   12/28/2023 N/A  Final     MDMA, Urine   Date Value Ref Range Status   12/28/2023 NEG  Final     Methamphetamine, Urine   Date Value Ref Range Status   12/28/2023 NEG  Final     Opiate Scrn, Ur   Date Value Ref Range Status   12/28/2023 POS  Final     Comment:     PT TAKES PERCOCET     Oxycodone Screen, Ur   Date Value Ref Range Status   12/28/2023 POS  Final     Comment:     PT TAKES PERCOCET     PCP Screen, Urine   Date Value Ref Range Status   12/28/2023 N/A  Final     Propoxyphene Screen, Urine   Date Value Ref Range Status   12/28/2023 N/A  Final     THC Screen, Urine   Date Value Ref Range Status   12/28/2023 NEG  Final     Tricyclic Antidepressants, Urine   Date Value Ref Range Status   06/19/2023 NEG  Final       Last Bryan: 02/14/24  Medication Contract: 06/19/23     Requested Prescriptions     Pending Prescriptions Disp Refills    LORazepam (ATIVAN) 0.5 MG tablet [Pharmacy Med Name: LORAZEPAM 0.5MG TABLET] 90 tablet 0     Sig: TAKE ONE (1) TABLET BY MOUTH EVERY 8 HOURS AS NEEDED FOR ANXIETY         Please approve or refuse this medication.   Deysi Cruz MA

## 2024-03-05 DIAGNOSIS — F41.9 ANXIETY: ICD-10-CM

## 2024-03-05 RX ORDER — DIAZEPAM 10 MG/1
TABLET ORAL
Qty: 60 TABLET | Refills: 0 | Status: SHIPPED | OUTPATIENT
Start: 2024-03-10 | End: 2024-04-10

## 2024-03-05 NOTE — TELEPHONE ENCOUNTER
Eleazar ROSALES Dexter called to request a refill on his medication.      Last office visit : 12/28/2023   Next office visit : 3/28/2024     Last UDS:   Amphetamine Screen, Urine   Date Value Ref Range Status   12/28/2023 NEG  Final     Barbiturate Screen, Urine   Date Value Ref Range Status   12/28/2023 NEG  Final     Benzodiazepine Screen, Urine   Date Value Ref Range Status   12/28/2023 POS  Final     Comment:     PT TAKES ATIVAN     Buprenorphine Urine   Date Value Ref Range Status   12/28/2023 NEG  Final     Cocaine Metabolite Screen, Urine   Date Value Ref Range Status   12/28/2023 NEG  Final     Gabapentin Screen, Urine   Date Value Ref Range Status   12/28/2023 N/A  Final     MDMA, Urine   Date Value Ref Range Status   12/28/2023 NEG  Final     Methamphetamine, Urine   Date Value Ref Range Status   12/28/2023 NEG  Final     Opiate Scrn, Ur   Date Value Ref Range Status   12/28/2023 POS  Final     Comment:     PT TAKES PERCOCET     Oxycodone Screen, Ur   Date Value Ref Range Status   12/28/2023 POS  Final     Comment:     PT TAKES PERCOCET     PCP Screen, Urine   Date Value Ref Range Status   12/28/2023 N/A  Final     Propoxyphene Screen, Urine   Date Value Ref Range Status   12/28/2023 N/A  Final     THC Screen, Urine   Date Value Ref Range Status   12/28/2023 NEG  Final     Tricyclic Antidepressants, Urine   Date Value Ref Range Status   06/19/2023 NEG  Final       Last Bryan: 02/27/2024  Medication Contract: 06/19/2023     Requested Prescriptions     Pending Prescriptions Disp Refills    diazePAM (VALIUM) 10 MG tablet [Pharmacy Med Name: DIAZEPAM 10MG TABLET] 60 tablet 0     Sig: TAKE ONE (1) TABLET BY MOUTH TWICE A DAY AS NEEDED FOR ANXIETY         Please approve or refuse this medication.   Salina Pimentel LPN

## 2024-03-19 DIAGNOSIS — G89.29 CHRONIC LOW BACK PAIN, UNSPECIFIED BACK PAIN LATERALITY, UNSPECIFIED WHETHER SCIATICA PRESENT: ICD-10-CM

## 2024-03-19 DIAGNOSIS — M54.50 CHRONIC LOW BACK PAIN, UNSPECIFIED BACK PAIN LATERALITY, UNSPECIFIED WHETHER SCIATICA PRESENT: ICD-10-CM

## 2024-03-19 RX ORDER — OXYCODONE AND ACETAMINOPHEN 10; 325 MG/1; MG/1
1 TABLET ORAL EVERY 6 HOURS PRN
Qty: 120 TABLET | Refills: 0 | Status: SHIPPED | OUTPATIENT
Start: 2024-03-21 | End: 2024-04-20

## 2024-03-27 DIAGNOSIS — F41.9 ANXIETY DISORDER, UNSPECIFIED TYPE: ICD-10-CM

## 2024-03-27 DIAGNOSIS — R53.83 OTHER FATIGUE: ICD-10-CM

## 2024-03-27 LAB
ALBUMIN SERPL-MCNC: 4.5 G/DL (ref 3.5–5.2)
ALP SERPL-CCNC: 64 U/L (ref 40–130)
ALT SERPL-CCNC: 23 U/L (ref 5–41)
ANION GAP SERPL CALCULATED.3IONS-SCNC: 12 MMOL/L (ref 7–19)
AST SERPL-CCNC: 32 U/L (ref 5–40)
BASOPHILS # BLD: 0.1 K/UL (ref 0–0.2)
BASOPHILS NFR BLD: 0.7 % (ref 0–1)
BILIRUB SERPL-MCNC: 0.3 MG/DL (ref 0.2–1.2)
BUN SERPL-MCNC: 16 MG/DL (ref 6–20)
CALCIUM SERPL-MCNC: 9.8 MG/DL (ref 8.6–10)
CHLORIDE SERPL-SCNC: 96 MMOL/L (ref 98–111)
CO2 SERPL-SCNC: 29 MMOL/L (ref 22–29)
CREAT SERPL-MCNC: 1 MG/DL (ref 0.5–1.2)
EOSINOPHIL # BLD: 0.3 K/UL (ref 0–0.6)
EOSINOPHIL NFR BLD: 4.2 % (ref 0–5)
ERYTHROCYTE [DISTWIDTH] IN BLOOD BY AUTOMATED COUNT: 12.7 % (ref 11.5–14.5)
GLUCOSE SERPL-MCNC: 116 MG/DL (ref 74–109)
HCT VFR BLD AUTO: 42.6 % (ref 42–52)
HGB BLD-MCNC: 13.3 G/DL (ref 14–18)
IMM GRANULOCYTES # BLD: 0 K/UL
LYMPHOCYTES # BLD: 1.6 K/UL (ref 1.1–4.5)
LYMPHOCYTES NFR BLD: 22.5 % (ref 20–40)
MCH RBC QN AUTO: 28.9 PG (ref 27–31)
MCHC RBC AUTO-ENTMCNC: 31.2 G/DL (ref 33–37)
MCV RBC AUTO: 92.6 FL (ref 80–94)
MONOCYTES # BLD: 0.5 K/UL (ref 0–0.9)
MONOCYTES NFR BLD: 7.7 % (ref 0–10)
NEUTROPHILS # BLD: 4.5 K/UL (ref 1.5–7.5)
NEUTS SEG NFR BLD: 64.8 % (ref 50–65)
PLATELET # BLD AUTO: 269 K/UL (ref 130–400)
PMV BLD AUTO: 9.9 FL (ref 9.4–12.4)
POTASSIUM SERPL-SCNC: 4.6 MMOL/L (ref 3.5–5)
PROT SERPL-MCNC: 7.3 G/DL (ref 6.6–8.7)
RBC # BLD AUTO: 4.6 M/UL (ref 4.7–6.1)
SODIUM SERPL-SCNC: 137 MMOL/L (ref 136–145)
TESTOST SERPL-MCNC: 346.1 NG/DL (ref 193–740)
WBC # BLD AUTO: 6.9 K/UL (ref 4.8–10.8)

## 2024-03-27 RX ORDER — LORAZEPAM 0.5 MG/1
TABLET ORAL
Qty: 90 TABLET | Refills: 0 | Status: SHIPPED | OUTPATIENT
Start: 2024-03-27 | End: 2024-04-27

## 2024-03-27 NOTE — TELEPHONE ENCOUNTER
Eleazar ROSALES Dexter called to request a refill on his medication.      Last office visit : 12/28/2023   Next office visit : 3/28/2024     Last UDS:   Amphetamine Screen, Urine   Date Value Ref Range Status   12/28/2023 NEG  Final     Barbiturate Screen, Urine   Date Value Ref Range Status   12/28/2023 NEG  Final     Benzodiazepine Screen, Urine   Date Value Ref Range Status   12/28/2023 POS  Final     Comment:     PT TAKES ATIVAN     Buprenorphine Urine   Date Value Ref Range Status   12/28/2023 NEG  Final     Cocaine Metabolite Screen, Urine   Date Value Ref Range Status   12/28/2023 NEG  Final     Gabapentin Screen, Urine   Date Value Ref Range Status   12/28/2023 N/A  Final     MDMA, Urine   Date Value Ref Range Status   12/28/2023 NEG  Final     Methamphetamine, Urine   Date Value Ref Range Status   12/28/2023 NEG  Final     Opiate Scrn, Ur   Date Value Ref Range Status   12/28/2023 POS  Final     Comment:     PT TAKES PERCOCET     Oxycodone Screen, Ur   Date Value Ref Range Status   12/28/2023 POS  Final     Comment:     PT TAKES PERCOCET     PCP Screen, Urine   Date Value Ref Range Status   12/28/2023 N/A  Final     Propoxyphene Screen, Urine   Date Value Ref Range Status   12/28/2023 N/A  Final     THC Screen, Urine   Date Value Ref Range Status   12/28/2023 NEG  Final     Tricyclic Antidepressants, Urine   Date Value Ref Range Status   06/19/2023 NEG  Final       Last Bryan: 03/19/2024  Medication Contract: 06/19/2023     Requested Prescriptions     Pending Prescriptions Disp Refills    LORazepam (ATIVAN) 0.5 MG tablet [Pharmacy Med Name: LORAZEPAM 0.5MG TABLET] 90 tablet 0     Sig: TAKE ONE (1) TABLET BY MOUTH EVERY 8 HOURS AS NEEDED FOR ANXIETY         Please approve or refuse this medication.   Salina Pimentel, WANDAN

## 2024-03-28 ENCOUNTER — OFFICE VISIT (OUTPATIENT)
Dept: INTERNAL MEDICINE | Age: 54
End: 2024-03-28

## 2024-03-28 VITALS
SYSTOLIC BLOOD PRESSURE: 121 MMHG | BODY MASS INDEX: 23.3 KG/M2 | OXYGEN SATURATION: 100 % | HEIGHT: 72 IN | HEART RATE: 68 BPM | DIASTOLIC BLOOD PRESSURE: 76 MMHG | WEIGHT: 172 LBS

## 2024-03-28 DIAGNOSIS — M54.50 CHRONIC LOW BACK PAIN, UNSPECIFIED BACK PAIN LATERALITY, UNSPECIFIED WHETHER SCIATICA PRESENT: ICD-10-CM

## 2024-03-28 DIAGNOSIS — Z13.9 SCREENING DUE: ICD-10-CM

## 2024-03-28 DIAGNOSIS — F41.9 ANXIETY DISORDER, UNSPECIFIED TYPE: Primary | ICD-10-CM

## 2024-03-28 DIAGNOSIS — G89.29 CHRONIC LOW BACK PAIN, UNSPECIFIED BACK PAIN LATERALITY, UNSPECIFIED WHETHER SCIATICA PRESENT: ICD-10-CM

## 2024-03-28 DIAGNOSIS — R53.83 OTHER FATIGUE: ICD-10-CM

## 2024-03-28 DIAGNOSIS — R11.0 NAUSEA: ICD-10-CM

## 2024-03-28 DIAGNOSIS — Z79.899 HIGH RISK MEDICATION USE: ICD-10-CM

## 2024-03-28 DIAGNOSIS — Z91.09 ENVIRONMENTAL ALLERGIES: ICD-10-CM

## 2024-03-28 DIAGNOSIS — K21.9 GASTROESOPHAGEAL REFLUX DISEASE WITHOUT ESOPHAGITIS: ICD-10-CM

## 2024-03-28 DIAGNOSIS — G89.29 CHRONIC NECK PAIN: ICD-10-CM

## 2024-03-28 DIAGNOSIS — G25.0 BENIGN ESSENTIAL TREMOR: ICD-10-CM

## 2024-03-28 DIAGNOSIS — M54.2 CHRONIC NECK PAIN: ICD-10-CM

## 2024-03-28 PROCEDURE — 80305 DRUG TEST PRSMV DIR OPT OBS: CPT | Performed by: INTERNAL MEDICINE

## 2024-03-28 PROCEDURE — 99214 OFFICE O/P EST MOD 30 MIN: CPT | Performed by: INTERNAL MEDICINE

## 2024-03-28 RX ORDER — PROPRANOLOL HYDROCHLORIDE 20 MG/1
TABLET ORAL
Qty: 180 TABLET | Refills: 1 | Status: SHIPPED | OUTPATIENT
Start: 2024-03-28

## 2024-03-28 ASSESSMENT — PATIENT HEALTH QUESTIONNAIRE - PHQ9
SUM OF ALL RESPONSES TO PHQ9 QUESTIONS 1 & 2: 0
SUM OF ALL RESPONSES TO PHQ QUESTIONS 1-9: 0
SUM OF ALL RESPONSES TO PHQ QUESTIONS 1-9: 0
1. LITTLE INTEREST OR PLEASURE IN DOING THINGS: NOT AT ALL
2. FEELING DOWN, DEPRESSED OR HOPELESS: NOT AT ALL
SUM OF ALL RESPONSES TO PHQ QUESTIONS 1-9: 0
SUM OF ALL RESPONSES TO PHQ QUESTIONS 1-9: 0

## 2024-03-28 NOTE — PROGRESS NOTES
Chief Complaint   Patient presents with    Follow-up     3 month follow up       HPI: Eleazar Renteria is a 54 y.o. male is here for follow-up of chronic back and neck pain, anxiety, environmental allergies, acid reflux and tremors.    His anxiety is well-controlled on Ativan and Valium.  He has been on these medications for a number of years, prior to seeing me.  He has always been compliant with his medication regimen and we have not had any deviations in terms of his drug screens or Bryan reports.    He does have a history of chronic back and neck pain.  Valium is also helpful for neck spasms.  Percocet is helpful for relief of pain.  He also has a history of tremors to his hands.  The Valium is helpful for tremors.  He has been made aware of the long-term risk of taking these medications including sedation, overdose, addiction and death.  However, this medication regimen does work well for him.  He has been offered referral to psychiatry and pain management.  However, he declines at this time.  He does not have any insurance.  Therefore, he cannot afford to go to multiple specialists,    Propranolol is helpful for tremors and his acid reflux is well-controlled on his current medication regimen.  His allergies are stable.  Overall, he feels like he is doing well has no major concerns or complaints today.    He does take Phenergan as needed for nausea.  Sometimes, his pain medications do make him nauseated.    Past Medical History:   Diagnosis Date    Allergic rhinitis     Cellulitis     Gastroparesis     GERD (gastroesophageal reflux disease)       Past Surgical History:   Procedure Laterality Date    CHOLECYSTECTOMY      NM COLONOSCOPY FLX DX W/COLLJ SPEC WHEN PFRMD N/A 8/2/2018    Dr TULIO Brandon-lena, 10 yr recall      Social History     Socioeconomic History    Marital status:      Spouse name: None    Number of children: None    Years of education: None    Highest education level: None   Occupational

## 2024-04-01 DIAGNOSIS — F41.9 ANXIETY: ICD-10-CM

## 2024-04-01 SDOH — ECONOMIC STABILITY: FOOD INSECURITY: WITHIN THE PAST 12 MONTHS, YOU WORRIED THAT YOUR FOOD WOULD RUN OUT BEFORE YOU GOT MONEY TO BUY MORE.: NEVER TRUE

## 2024-04-01 SDOH — ECONOMIC STABILITY: FOOD INSECURITY: WITHIN THE PAST 12 MONTHS, THE FOOD YOU BOUGHT JUST DIDN'T LAST AND YOU DIDN'T HAVE MONEY TO GET MORE.: NEVER TRUE

## 2024-04-01 SDOH — ECONOMIC STABILITY: INCOME INSECURITY: HOW HARD IS IT FOR YOU TO PAY FOR THE VERY BASICS LIKE FOOD, HOUSING, MEDICAL CARE, AND HEATING?: NOT HARD AT ALL

## 2024-04-01 ASSESSMENT — ENCOUNTER SYMPTOMS
SORE THROAT: 0
COLOR CHANGE: 0
BLOOD IN STOOL: 0
COUGH: 0
EYE ITCHING: 0
VOICE CHANGE: 0
EYE DISCHARGE: 0
SINUS PAIN: 0
TROUBLE SWALLOWING: 0
RHINORRHEA: 0
EYE PAIN: 0
PHOTOPHOBIA: 0
SHORTNESS OF BREATH: 0
ABDOMINAL PAIN: 0
DIARRHEA: 0
SINUS PRESSURE: 0
EYE REDNESS: 0
BACK PAIN: 1
VOMITING: 0
NAUSEA: 1
CHEST TIGHTNESS: 0

## 2024-04-02 RX ORDER — DIAZEPAM 10 MG/1
TABLET ORAL
Qty: 60 TABLET | Refills: 0 | Status: SHIPPED | OUTPATIENT
Start: 2024-04-07 | End: 2024-05-07

## 2024-04-02 NOTE — TELEPHONE ENCOUNTER
Eleazar ROSALES Dexter called to request a refill on his medication.      Last office visit : 3/28/2024   Next office visit : 6/28/2024     Last UDS:   Amphetamine Screen, Urine   Date Value Ref Range Status   03/28/2024 NEG  Final     Barbiturate Screen, Urine   Date Value Ref Range Status   03/28/2024 NEG  Final     Benzodiazepine Screen, Urine   Date Value Ref Range Status   03/28/2024 POS  Final     Buprenorphine Urine   Date Value Ref Range Status   03/28/2024 NEG  Final     Cocaine Metabolite Screen, Urine   Date Value Ref Range Status   03/28/2024 NEG  Final     Gabapentin Screen, Urine   Date Value Ref Range Status   03/28/2024 NA  Final     MDMA, Urine   Date Value Ref Range Status   03/28/2024 NEG  Final     Methamphetamine, Urine   Date Value Ref Range Status   03/28/2024 NEG  Final     Opiate Scrn, Ur   Date Value Ref Range Status   03/28/2024 NEG  Final     Oxycodone Screen, Ur   Date Value Ref Range Status   03/28/2024 POS  Final     PCP Screen, Urine   Date Value Ref Range Status   03/28/2024 NEG  Final     Propoxyphene Screen, Urine   Date Value Ref Range Status   03/28/2024 NEG  Final     THC Screen, Urine   Date Value Ref Range Status   03/28/2024 NEG  Final     Tricyclic Antidepressants, Urine   Date Value Ref Range Status   03/28/2024 NEG  Final       Last Bryan: 03/27/2024  Medication Contract: 06/19/2023     Requested Prescriptions     Pending Prescriptions Disp Refills    diazePAM (VALIUM) 10 MG tablet [Pharmacy Med Name: DIAZEPAM 10MG TABLET] 60 tablet 0     Sig: TAKE ONE (1) TABLET BY MOUTH TWICE A DAY AS NEEDED FOR ANXIETY         Please approve or refuse this medication.   Salina Pimentel LPN

## 2024-04-16 DIAGNOSIS — G89.29 CHRONIC LOW BACK PAIN, UNSPECIFIED BACK PAIN LATERALITY, UNSPECIFIED WHETHER SCIATICA PRESENT: ICD-10-CM

## 2024-04-16 DIAGNOSIS — M54.50 CHRONIC LOW BACK PAIN, UNSPECIFIED BACK PAIN LATERALITY, UNSPECIFIED WHETHER SCIATICA PRESENT: ICD-10-CM

## 2024-04-18 RX ORDER — OXYCODONE AND ACETAMINOPHEN 10; 325 MG/1; MG/1
1 TABLET ORAL EVERY 6 HOURS PRN
Qty: 120 TABLET | Refills: 0 | Status: SHIPPED | OUTPATIENT
Start: 2024-04-18 | End: 2024-05-18

## 2024-04-24 DIAGNOSIS — F41.9 ANXIETY DISORDER, UNSPECIFIED TYPE: ICD-10-CM

## 2024-04-24 NOTE — TELEPHONE ENCOUNTER
Eleazar ROSALES Dexter called to request a refill on his medication.      Last office visit : 3/28/2024   Next office visit : 6/28/2024     Last UDS:   Amphetamine Screen, Urine   Date Value Ref Range Status   03/28/2024 NEG  Final     Barbiturate Screen, Urine   Date Value Ref Range Status   03/28/2024 NEG  Final     Benzodiazepine Screen, Urine   Date Value Ref Range Status   03/28/2024 POS  Final     Buprenorphine Urine   Date Value Ref Range Status   03/28/2024 NEG  Final     Cocaine Metabolite Screen, Urine   Date Value Ref Range Status   03/28/2024 NEG  Final     Gabapentin Screen, Urine   Date Value Ref Range Status   03/28/2024 NA  Final     MDMA, Urine   Date Value Ref Range Status   03/28/2024 NEG  Final     Methamphetamine, Urine   Date Value Ref Range Status   03/28/2024 NEG  Final     Opiate Scrn, Ur   Date Value Ref Range Status   03/28/2024 NEG  Final     Oxycodone Screen, Ur   Date Value Ref Range Status   03/28/2024 POS  Final     PCP Screen, Urine   Date Value Ref Range Status   03/28/2024 NEG  Final     Propoxyphene Screen, Urine   Date Value Ref Range Status   03/28/2024 NEG  Final     THC Screen, Urine   Date Value Ref Range Status   03/28/2024 NEG  Final     Tricyclic Antidepressants, Urine   Date Value Ref Range Status   03/28/2024 NEG  Final       Last Bryan: 04/18/2024  Medication Contract: 06/19/2023     Requested Prescriptions     Pending Prescriptions Disp Refills    LORazepam (ATIVAN) 0.5 MG tablet [Pharmacy Med Name: LORAZEPAM 0.5MG TABLET] 90 tablet 0     Sig: TAKE ONE (1) TABLET BY MOUTH EVERY 8 HOURS AS NEEDED FOR ANXIETY         Please approve or refuse this medication.   Salina Pimentel LPN

## 2024-04-25 RX ORDER — LORAZEPAM 0.5 MG/1
TABLET ORAL
Qty: 90 TABLET | Refills: 1 | Status: SHIPPED | OUTPATIENT
Start: 2024-05-05 | End: 2024-06-05

## 2024-05-02 DIAGNOSIS — F41.9 ANXIETY: ICD-10-CM

## 2024-05-02 RX ORDER — DIAZEPAM 10 MG/1
TABLET ORAL
Qty: 60 TABLET | Refills: 1 | Status: SHIPPED | OUTPATIENT
Start: 2024-05-05 | End: 2024-06-05

## 2024-05-02 NOTE — TELEPHONE ENCOUNTER
Eleazar ROSALES Dexter called to request a refill on his medication.      Last office visit : 3/28/2024   Next office visit : 6/28/2024     Last UDS:   Amphetamine Screen, Urine   Date Value Ref Range Status   03/28/2024 NEG  Final     Barbiturate Screen, Urine   Date Value Ref Range Status   03/28/2024 NEG  Final     Benzodiazepine Screen, Urine   Date Value Ref Range Status   03/28/2024 POS  Final     Buprenorphine Urine   Date Value Ref Range Status   03/28/2024 NEG  Final     Cocaine Metabolite Screen, Urine   Date Value Ref Range Status   03/28/2024 NEG  Final     Gabapentin Screen, Urine   Date Value Ref Range Status   03/28/2024 NA  Final     MDMA, Urine   Date Value Ref Range Status   03/28/2024 NEG  Final     Opiate Scrn, Ur   Date Value Ref Range Status   03/28/2024 NEG  Final     Oxycodone Screen, Ur   Date Value Ref Range Status   03/28/2024 POS  Final     PCP Screen, Urine   Date Value Ref Range Status   03/28/2024 NEG  Final     Propoxyphene Screen, Urine   Date Value Ref Range Status   03/28/2024 NEG  Final     THC Screen, Urine   Date Value Ref Range Status   03/28/2024 NEG  Final     Tricyclic Antidepressants, Urine   Date Value Ref Range Status   03/28/2024 NEG  Final       Last Bryan: [id number]  Medication Contract: 06/19/2023     Requested Prescriptions     Pending Prescriptions Disp Refills    diazePAM (VALIUM) 10 MG tablet [Pharmacy Med Name: DIAZEPAM 10MG TABLET] 60 tablet 0     Sig: TAKE ONE (1) TABLET BY MOUTH TWICE A DAY AS NEEDED FOR ANXIETY         Please approve or refuse this medication.   Salina Pimentel LPN

## 2024-05-15 DIAGNOSIS — M54.50 CHRONIC LOW BACK PAIN, UNSPECIFIED BACK PAIN LATERALITY, UNSPECIFIED WHETHER SCIATICA PRESENT: ICD-10-CM

## 2024-05-15 DIAGNOSIS — G89.29 CHRONIC LOW BACK PAIN, UNSPECIFIED BACK PAIN LATERALITY, UNSPECIFIED WHETHER SCIATICA PRESENT: ICD-10-CM

## 2024-05-15 RX ORDER — OXYCODONE AND ACETAMINOPHEN 10; 325 MG/1; MG/1
TABLET ORAL
Qty: 120 TABLET | Refills: 0 | OUTPATIENT
Start: 2024-05-15

## 2024-05-15 NOTE — TELEPHONE ENCOUNTER
Eleazar ROSALES Dexter called to request a refill on his medication.      Last office visit : 3/28/2024   Next office visit : 6/28/2024     Last UDS:   Benzodiazepine Screen, Urine   Date Value Ref Range Status   03/28/2024 POS  Final     Buprenorphine Urine   Date Value Ref Range Status   03/28/2024 NEG  Final     Cocaine Metabolite Screen, Urine   Date Value Ref Range Status   03/28/2024 NEG  Final     Gabapentin Screen, Urine   Date Value Ref Range Status   03/28/2024 NA  Final     MDMA, Urine   Date Value Ref Range Status   03/28/2024 NEG  Final     Oxycodone Screen, Ur   Date Value Ref Range Status   03/28/2024 POS  Final     Propoxyphene Screen, Urine   Date Value Ref Range Status   03/28/2024 NEG  Final     THC Screen, Urine   Date Value Ref Range Status   03/28/2024 NEG  Final     Tricyclic Antidepressants, Urine   Date Value Ref Range Status   03/28/2024 NEG  Final       Last Bryan: 05/02/24  Medication Contract: 06/19/2023     Requested Prescriptions     Pending Prescriptions Disp Refills    oxyCODONE-acetaminophen (PERCOCET)  MG per tablet 120 tablet 0     Sig: Take 1 tablet by mouth every 6 hours as needed for Pain for up to 30 days. Max Daily Amount: 4 tablets         Please approve or refuse this medication.   Salina Pimentel LPN

## 2024-05-16 RX ORDER — OXYCODONE AND ACETAMINOPHEN 10; 325 MG/1; MG/1
1 TABLET ORAL EVERY 6 HOURS PRN
Qty: 120 TABLET | Refills: 0 | Status: SHIPPED | OUTPATIENT
Start: 2024-05-18 | End: 2024-06-17

## 2024-05-17 ENCOUNTER — TELEPHONE (OUTPATIENT)
Dept: INTERNAL MEDICINE | Age: 54
End: 2024-05-17

## 2024-05-17 NOTE — TELEPHONE ENCOUNTER
Wanting to know if script can be resent in for todays date as they are closed tomorrow when due to be filled.

## 2024-05-23 DIAGNOSIS — F41.9 ANXIETY DISORDER, UNSPECIFIED TYPE: ICD-10-CM

## 2024-05-24 RX ORDER — LORAZEPAM 0.5 MG/1
TABLET ORAL
Qty: 90 TABLET | Refills: 0 | Status: SHIPPED | OUTPATIENT
Start: 2024-05-24 | End: 2024-06-23

## 2024-05-31 DIAGNOSIS — F41.9 ANXIETY DISORDER, UNSPECIFIED TYPE: ICD-10-CM

## 2024-05-31 RX ORDER — PROPRANOLOL HYDROCHLORIDE 20 MG/1
TABLET ORAL
Qty: 180 TABLET | Refills: 1 | Status: SHIPPED | OUTPATIENT
Start: 2024-05-31

## 2024-06-17 DIAGNOSIS — M54.50 CHRONIC LOW BACK PAIN, UNSPECIFIED BACK PAIN LATERALITY, UNSPECIFIED WHETHER SCIATICA PRESENT: ICD-10-CM

## 2024-06-17 DIAGNOSIS — G89.29 CHRONIC LOW BACK PAIN, UNSPECIFIED BACK PAIN LATERALITY, UNSPECIFIED WHETHER SCIATICA PRESENT: ICD-10-CM

## 2024-06-17 RX ORDER — OXYCODONE AND ACETAMINOPHEN 10; 325 MG/1; MG/1
1 TABLET ORAL EVERY 6 HOURS PRN
Qty: 120 TABLET | Refills: 0 | Status: SHIPPED | OUTPATIENT
Start: 2024-06-17 | End: 2024-07-17

## 2024-06-17 NOTE — TELEPHONE ENCOUNTER
Eleazar ROSALES Dexter called to request a refill on his medication.      Last office visit : 3/28/2024   Next office visit : 6/28/2024     Last UDS:   Benzodiazepine Screen, Urine   Date Value Ref Range Status   03/28/2024 POS  Final     Buprenorphine Urine   Date Value Ref Range Status   03/28/2024 NEG  Final     Cocaine Metabolite Screen, Urine   Date Value Ref Range Status   03/28/2024 NEG  Final     Gabapentin Screen, Urine   Date Value Ref Range Status   03/28/2024 NA  Final     MDMA, Urine   Date Value Ref Range Status   03/28/2024 NEG  Final     Oxycodone Screen, Ur   Date Value Ref Range Status   03/28/2024 POS  Final     Propoxyphene Screen, Urine   Date Value Ref Range Status   03/28/2024 NEG  Final     THC Screen, Urine   Date Value Ref Range Status   03/28/2024 NEG  Final     Tricyclic Antidepressants, Urine   Date Value Ref Range Status   03/28/2024 NEG  Final       Last Bryan: 05/24/2024  Medication Contract: 06/19/2023     Requested Prescriptions     Pending Prescriptions Disp Refills    oxyCODONE-acetaminophen (PERCOCET)  MG per tablet [Pharmacy Med Name: OXYCODONE/ACETAMINOPHEN 10-325MG TABLET] 120 tablet 0     Sig: TAKE ONE (1) TABLET BY MOUTH EVERY SIX (6) HOURS AS NEEDED FOR PAIN FOR UP TO 30 DAYS. MAX DAILY AMOUNT: FOUR (4) TABLETS         Please approve or refuse this medication.   Salina Pimentel LPN

## 2024-06-28 ENCOUNTER — OFFICE VISIT (OUTPATIENT)
Dept: INTERNAL MEDICINE | Age: 54
End: 2024-06-28

## 2024-06-28 VITALS
OXYGEN SATURATION: 98 % | HEART RATE: 81 BPM | WEIGHT: 164 LBS | DIASTOLIC BLOOD PRESSURE: 67 MMHG | HEIGHT: 72 IN | BODY MASS INDEX: 22.21 KG/M2 | SYSTOLIC BLOOD PRESSURE: 100 MMHG

## 2024-06-28 DIAGNOSIS — Z13.9 SCREENING DUE: ICD-10-CM

## 2024-06-28 DIAGNOSIS — D50.9 IRON DEFICIENCY ANEMIA, UNSPECIFIED IRON DEFICIENCY ANEMIA TYPE: Primary | ICD-10-CM

## 2024-06-28 DIAGNOSIS — R53.83 OTHER FATIGUE: ICD-10-CM

## 2024-06-28 LAB
25(OH)D3 SERPL-MCNC: 80.9 NG/ML
ALBUMIN SERPL-MCNC: 4.6 G/DL (ref 3.5–5.2)
ALP SERPL-CCNC: 74 U/L (ref 40–130)
ALT SERPL-CCNC: 28 U/L (ref 5–41)
ANION GAP SERPL CALCULATED.3IONS-SCNC: 13 MMOL/L (ref 7–19)
AST SERPL-CCNC: 26 U/L (ref 5–40)
BASOPHILS # BLD: 0.1 K/UL (ref 0–0.2)
BASOPHILS NFR BLD: 1 % (ref 0–1)
BILIRUB SERPL-MCNC: 0.6 MG/DL (ref 0.2–1.2)
BUN SERPL-MCNC: 14 MG/DL (ref 6–20)
CALCIUM SERPL-MCNC: 10.2 MG/DL (ref 8.6–10)
CHLORIDE SERPL-SCNC: 99 MMOL/L (ref 98–111)
CHOLEST SERPL-MCNC: 156 MG/DL (ref 160–199)
CO2 SERPL-SCNC: 27 MMOL/L (ref 22–29)
CREAT SERPL-MCNC: 1.1 MG/DL (ref 0.5–1.2)
EOSINOPHIL # BLD: 0.1 K/UL (ref 0–0.6)
EOSINOPHIL NFR BLD: 0.7 % (ref 0–5)
ERYTHROCYTE [DISTWIDTH] IN BLOOD BY AUTOMATED COUNT: 13.9 % (ref 11.5–14.5)
GLUCOSE SERPL-MCNC: 123 MG/DL (ref 74–109)
HBA1C MFR BLD: 5.6 % (ref 4–6)
HCT VFR BLD AUTO: 43.3 % (ref 42–52)
HDLC SERPL-MCNC: 60 MG/DL (ref 55–121)
HGB BLD-MCNC: 13.9 G/DL (ref 14–18)
IMM GRANULOCYTES # BLD: 0 K/UL
LDLC SERPL CALC-MCNC: 77 MG/DL
LYMPHOCYTES # BLD: 1.3 K/UL (ref 1.1–4.5)
LYMPHOCYTES NFR BLD: 18.6 % (ref 20–40)
MCH RBC QN AUTO: 29.6 PG (ref 27–31)
MCHC RBC AUTO-ENTMCNC: 32.1 G/DL (ref 33–37)
MCV RBC AUTO: 92.3 FL (ref 80–94)
MONOCYTES # BLD: 0.6 K/UL (ref 0–0.9)
MONOCYTES NFR BLD: 7.9 % (ref 0–10)
NEUTROPHILS # BLD: 5.1 K/UL (ref 1.5–7.5)
NEUTS SEG NFR BLD: 71.4 % (ref 50–65)
PLATELET # BLD AUTO: 333 K/UL (ref 130–400)
PMV BLD AUTO: 9.7 FL (ref 9.4–12.4)
POTASSIUM SERPL-SCNC: 4.1 MMOL/L (ref 3.5–5)
PROT SERPL-MCNC: 7.9 G/DL (ref 6.6–8.7)
RBC # BLD AUTO: 4.69 M/UL (ref 4.7–6.1)
SODIUM SERPL-SCNC: 139 MMOL/L (ref 136–145)
TRIGL SERPL-MCNC: 97 MG/DL (ref 0–149)
TSH SERPL DL<=0.005 MIU/L-ACNC: 1.01 UIU/ML (ref 0.27–4.2)
WBC # BLD AUTO: 7.1 K/UL (ref 4.8–10.8)

## 2024-06-28 PROCEDURE — 99396 PREV VISIT EST AGE 40-64: CPT | Performed by: INTERNAL MEDICINE

## 2024-06-28 NOTE — PROGRESS NOTES
No friction rub. No gallop.   Pulmonary:      Effort: Pulmonary effort is normal. No respiratory distress.      Breath sounds: Normal breath sounds. No stridor. No wheezing, rhonchi or rales.   Chest:      Chest wall: No tenderness.   Abdominal:      General: Bowel sounds are normal. There is no distension.      Palpations: Abdomen is soft. There is no mass.      Tenderness: There is no abdominal tenderness. There is no right CVA tenderness, left CVA tenderness, guarding or rebound.      Hernia: No hernia is present.   Musculoskeletal:         General: Tenderness present. No swelling, deformity or signs of injury. Normal range of motion.      Cervical back: Normal range of motion and neck supple. No rigidity. No muscular tenderness.      Right lower leg: No edema.      Left lower leg: No edema.      Comments: Gait antalgic. There is tenderness over the lower lumbar spine   Lymphadenopathy:      Cervical: No cervical adenopathy.   Skin:     General: Skin is warm and dry.      Capillary Refill: Capillary refill takes less than 2 seconds.      Coloration: Skin is not jaundiced or pale.      Findings: No bruising, erythema, lesion or rash.   Neurological:      General: No focal deficit present.      Mental Status: He is alert and oriented to person, place, and time. Mental status is at baseline.      Cranial Nerves: No cranial nerve deficit.      Sensory: No sensory deficit.      Motor: No weakness or abnormal muscle tone.      Coordination: Coordination normal.      Gait: Gait normal.      Deep Tendon Reflexes: Reflexes are normal and symmetric. Reflexes normal.      Comments: Tremor to hands noted   Psychiatric:         Mood and Affect: Mood normal.         Behavior: Behavior normal.         Thought Content: Thought content normal.         Judgment: Judgment normal.         No diagnosis found.    ASSESSMENT/PLAN:    54-year-old male here for follow-up    1.  Health maintenance: Routine screening is as per HPI.  Labs

## 2024-07-01 DIAGNOSIS — F41.9 ANXIETY: ICD-10-CM

## 2024-07-01 RX ORDER — DIAZEPAM 10 MG/1
TABLET ORAL
Qty: 60 TABLET | Refills: 0 | Status: SHIPPED | OUTPATIENT
Start: 2024-07-03 | End: 2024-08-03

## 2024-07-01 NOTE — TELEPHONE ENCOUNTER
Eleazar ROSALES Dex[place] called to request a refill on his medication.      Last office visit : 6/28/2024   Next office visit : 10/11/2024     Last UDS:   Benzodiazepine Screen, Urine   Date Value Ref Range Status   03/28/2024 POS  Final     Buprenorphine Urine   Date Value Ref Range Status   03/28/2024 NEG  Final     Cocaine Metabolite Screen, Urine   Date Value Ref Range Status   03/28/2024 NEG  Final     Gabapentin Screen, Urine   Date Value Ref Range Status   03/28/2024 NA  Final     MDMA, Urine   Date Value Ref Range Status   03/28/2024 NEG  Final     Oxycodone Screen, Ur   Date Value Ref Range Status   03/28/2024 POS  Final     Propoxyphene Screen, Urine   Date Value Ref Range Status   03/28/2024 NEG  Final     THC Screen, Urine   Date Value Ref Range Status   03/28/2024 NEG  Final     Tricyclic Antidepressants, Urine   Date Value Ref Range Status   03/28/2024 NEG  Final       Last Bryan: 06/17/2024  Medication Contract: 06/28/2024     Requested Prescriptions     Pending Prescriptions Disp Refills    diazePAM (VALIUM) 10 MG tablet [Pharmacy Med Name: DIAZEPAM 10MG TABLET] 60 tablet 1     Sig: TAKE ONE (1) TABLET BY MOUTH TWICE A DAY AS NEEDED FOR ANXIETY         Please approve or refuse this medication.   Salina Pimentel LPN

## 2024-07-12 ENCOUNTER — TELEPHONE (OUTPATIENT)
Dept: INTERNAL MEDICINE | Age: 54
End: 2024-07-12

## 2024-07-12 SDOH — ECONOMIC STABILITY: FOOD INSECURITY: WITHIN THE PAST 12 MONTHS, THE FOOD YOU BOUGHT JUST DIDN'T LAST AND YOU DIDN'T HAVE MONEY TO GET MORE.: NEVER TRUE

## 2024-07-12 SDOH — ECONOMIC STABILITY: FOOD INSECURITY: WITHIN THE PAST 12 MONTHS, YOU WORRIED THAT YOUR FOOD WOULD RUN OUT BEFORE YOU GOT MONEY TO BUY MORE.: NEVER TRUE

## 2024-07-12 SDOH — ECONOMIC STABILITY: INCOME INSECURITY: HOW HARD IS IT FOR YOU TO PAY FOR THE VERY BASICS LIKE FOOD, HOUSING, MEDICAL CARE, AND HEATING?: NOT HARD AT ALL

## 2024-07-12 ASSESSMENT — PATIENT HEALTH QUESTIONNAIRE - PHQ9
1. LITTLE INTEREST OR PLEASURE IN DOING THINGS: NOT AT ALL
SUM OF ALL RESPONSES TO PHQ QUESTIONS 1-9: 0
SUM OF ALL RESPONSES TO PHQ9 QUESTIONS 1 & 2: 0
SUM OF ALL RESPONSES TO PHQ QUESTIONS 1-9: 0
SUM OF ALL RESPONSES TO PHQ QUESTIONS 1-9: 0
2. FEELING DOWN, DEPRESSED OR HOPELESS: NOT AT ALL
SUM OF ALL RESPONSES TO PHQ QUESTIONS 1-9: 0

## 2024-07-12 ASSESSMENT — ENCOUNTER SYMPTOMS
COLOR CHANGE: 0
SORE THROAT: 0
TROUBLE SWALLOWING: 0
EYE REDNESS: 0
SINUS PAIN: 0
EYE PAIN: 0
VOMITING: 0
ABDOMINAL PAIN: 0
VOICE CHANGE: 0
RHINORRHEA: 0
COUGH: 0
BACK PAIN: 1
DIARRHEA: 0
EYE DISCHARGE: 0
EYE ITCHING: 0
NAUSEA: 1
WHEEZING: 0
SINUS PRESSURE: 0
ABDOMINAL DISTENTION: 0
CONSTIPATION: 0
BLOOD IN STOOL: 0
PHOTOPHOBIA: 0
SHORTNESS OF BREATH: 0
CHEST TIGHTNESS: 0

## 2024-07-12 NOTE — TELEPHONE ENCOUNTER
Here is another concern from the call center.  This patient actually tried to reschedule his physical appointment.  However, the call center told him that I did not have any appointments available until December 20.  Apparently, they also told him that they would send my staff a message.  However, I do not see where a message was sent.

## 2024-07-15 DIAGNOSIS — G89.29 CHRONIC LOW BACK PAIN, UNSPECIFIED BACK PAIN LATERALITY, UNSPECIFIED WHETHER SCIATICA PRESENT: ICD-10-CM

## 2024-07-15 DIAGNOSIS — M54.50 CHRONIC LOW BACK PAIN, UNSPECIFIED BACK PAIN LATERALITY, UNSPECIFIED WHETHER SCIATICA PRESENT: ICD-10-CM

## 2024-07-15 NOTE — TELEPHONE ENCOUNTER
Eleazar ROSALES [place] called to request a refill on his medication.      Last office visit : 6/28/2024   Next office visit : 10/11/2024     Last UDS:   Benzodiazepine Screen, Urine   Date Value Ref Range Status   03/28/2024 POS  Final     Buprenorphine Urine   Date Value Ref Range Status   03/28/2024 NEG  Final     Cocaine Metabolite Screen, Urine   Date Value Ref Range Status   03/28/2024 NEG  Final     Gabapentin Screen, Urine   Date Value Ref Range Status   03/28/2024 NA  Final     MDMA, Urine   Date Value Ref Range Status   03/28/2024 NEG  Final     Oxycodone Screen, Ur   Date Value Ref Range Status   03/28/2024 POS  Final     Propoxyphene Screen, Urine   Date Value Ref Range Status   03/28/2024 NEG  Final     THC Screen, Urine   Date Value Ref Range Status   03/28/2024 NEG  Final     Tricyclic Antidepressants, Urine   Date Value Ref Range Status   03/28/2024 NEG  Final       Last Bryan: 07/01/2024  Medication Contract: 06/28/2024     Requested Prescriptions     Pending Prescriptions Disp Refills    oxyCODONE-acetaminophen (PERCOCET)  MG per tablet [Pharmacy Med Name: OXYCODONE/ACETAMINOPHEN 10-325MG TABLET] 120 tablet 0     Sig: TAKE ONE (1) TABLET BY MOUTH EVERY SIX (6) HOURS AS NEEDED FOR PAIN FOR UP TO 30 DAYS. MAX DAILY AMOUNT: FOUR (4) TABLETS         Please approve or refuse this medication.   Salina Pimentel LPN

## 2024-07-16 RX ORDER — OXYCODONE AND ACETAMINOPHEN 10; 325 MG/1; MG/1
1 TABLET ORAL EVERY 6 HOURS PRN
Qty: 120 TABLET | Refills: 0 | Status: SHIPPED | OUTPATIENT
Start: 2024-07-16 | End: 2024-08-16

## 2024-07-17 DIAGNOSIS — F41.9 ANXIETY DISORDER, UNSPECIFIED TYPE: ICD-10-CM

## 2024-07-17 RX ORDER — PROPRANOLOL HYDROCHLORIDE 20 MG/1
TABLET ORAL
Qty: 180 TABLET | Refills: 1 | Status: SHIPPED | OUTPATIENT
Start: 2024-07-17

## 2024-07-17 RX ORDER — LORAZEPAM 0.5 MG/1
TABLET ORAL
Qty: 90 TABLET | Refills: 0 | Status: SHIPPED | OUTPATIENT
Start: 2024-07-17 | End: 2024-08-16

## 2024-07-17 NOTE — TELEPHONE ENCOUNTER
Eleazar Renteria called to request a refill on his medication.      Last office visit : 6/28/2024   Next office visit : 10/11/2024     Last UDS:   Benzodiazepine Screen, Urine   Date Value Ref Range Status   03/28/2024 POS  Final     Buprenorphine Urine   Date Value Ref Range Status   03/28/2024 NEG  Final     Cocaine Metabolite Screen, Urine   Date Value Ref Range Status   03/28/2024 NEG  Final     Gabapentin Screen, Urine   Date Value Ref Range Status   03/28/2024 NA  Final     MDMA, Urine   Date Value Ref Range Status   03/28/2024 NEG  Final     Oxycodone Screen, Ur   Date Value Ref Range Status   03/28/2024 POS  Final     Propoxyphene Screen, Urine   Date Value Ref Range Status   03/28/2024 NEG  Final     THC Screen, Urine   Date Value Ref Range Status   03/28/2024 NEG  Final     Tricyclic Antidepressants, Urine   Date Value Ref Range Status   03/28/2024 NEG  Final       Last Bryan: [id number]  Medication Contract: 06/28/2024     Requested Prescriptions     Pending Prescriptions Disp Refills    LORazepam (ATIVAN) 0.5 MG tablet [Pharmacy Med Name: LORAZEPAM 0.5MG TABLET] 90 tablet 0     Sig: TAKE ONE (1) TABLET BY MOUTH EVERY 8 HOURS AS NEEDED FOR ANXIETY         Please approve or refuse this medication.   Salina Pimentel LPN

## 2024-07-19 DIAGNOSIS — K21.9 GASTROESOPHAGEAL REFLUX DISEASE WITHOUT ESOPHAGITIS: ICD-10-CM

## 2024-07-19 RX ORDER — PROMETHAZINE HYDROCHLORIDE 25 MG/1
TABLET ORAL
Qty: 90 TABLET | Refills: 1 | Status: SHIPPED | OUTPATIENT
Start: 2024-07-19

## 2024-07-31 DIAGNOSIS — F41.9 ANXIETY: ICD-10-CM

## 2024-07-31 NOTE — TELEPHONE ENCOUNTER
Eleazar ROSALES [place] called to request a refill on his medication.      Last office visit : 6/28/2024   Next office visit : 10/11/2024     Last UDS:   Benzodiazepine Screen, Urine   Date Value Ref Range Status   03/28/2024 POS  Final     Buprenorphine Urine   Date Value Ref Range Status   03/28/2024 NEG  Final     Cocaine Metabolite Screen, Urine   Date Value Ref Range Status   03/28/2024 NEG  Final     Gabapentin Screen, Urine   Date Value Ref Range Status   03/28/2024 NA  Final     MDMA, Urine   Date Value Ref Range Status   03/28/2024 NEG  Final     Oxycodone Screen, Ur   Date Value Ref Range Status   03/28/2024 POS  Final     Propoxyphene Screen, Urine   Date Value Ref Range Status   03/28/2024 NEG  Final     THC Screen, Urine   Date Value Ref Range Status   03/28/2024 NEG  Final     Tricyclic Antidepressants, Urine   Date Value Ref Range Status   03/28/2024 NEG  Final       Last Bryan: 07/17/2024  Medication Contract: 06/28/2024     Requested Prescriptions     Pending Prescriptions Disp Refills    diazePAM (VALIUM) 10 MG tablet [Pharmacy Med Name: DIAZEPAM 10MG TABLET] 60 tablet 0     Sig: TAKE ONE (1) TABLET BY MOUTH TWICE A DAY AS NEEDED FOR ANXIETY         Please approve or refuse this medication.   Salina Pimentel LPN

## 2024-08-01 RX ORDER — DIAZEPAM 10 MG/1
TABLET ORAL
Qty: 60 TABLET | Refills: 0 | Status: SHIPPED | OUTPATIENT
Start: 2024-08-01 | End: 2024-09-01

## 2024-08-13 DIAGNOSIS — G89.29 CHRONIC LOW BACK PAIN, UNSPECIFIED BACK PAIN LATERALITY, UNSPECIFIED WHETHER SCIATICA PRESENT: ICD-10-CM

## 2024-08-13 DIAGNOSIS — M54.50 CHRONIC LOW BACK PAIN, UNSPECIFIED BACK PAIN LATERALITY, UNSPECIFIED WHETHER SCIATICA PRESENT: ICD-10-CM

## 2024-08-13 RX ORDER — OXYCODONE AND ACETAMINOPHEN 10; 325 MG/1; MG/1
1 TABLET ORAL EVERY 6 HOURS PRN
Qty: 120 TABLET | Refills: 0 | Status: SHIPPED | OUTPATIENT
Start: 2024-08-15 | End: 2024-09-14

## 2024-08-13 NOTE — TELEPHONE ENCOUNTER
Eleazar Renteria called to request a refill on his medication.      Last office visit : 6/28/2024   Next office visit : 10/11/2024     Last UDS:   Benzodiazepine Screen, Urine   Date Value Ref Range Status   03/28/2024 POS  Final     Buprenorphine Urine   Date Value Ref Range Status   03/28/2024 NEG  Final     Cocaine Metabolite Screen, Urine   Date Value Ref Range Status   03/28/2024 NEG  Final     Gabapentin Screen, Urine   Date Value Ref Range Status   03/28/2024 NA  Final     Oxycodone Screen, Ur   Date Value Ref Range Status   03/28/2024 POS  Final     Propoxyphene Screen, Urine   Date Value Ref Range Status   03/28/2024 NEG  Final     THC Screen, Urine   Date Value Ref Range Status   03/28/2024 NEG  Final     Tricyclic Antidepressants, Urine   Date Value Ref Range Status   03/28/2024 NEG  Final       Last Bryan: 08/01/2024  Medication Contract: 06/28/2024     Requested Prescriptions     Pending Prescriptions Disp Refills    oxyCODONE-acetaminophen (PERCOCET)  MG per tablet [Pharmacy Med Name: OXYCODONE/ACETAMINOPHEN 10-325MG TABLET] 120 tablet 0     Sig: TAKE ONE (1) TABLET BY MOUTH EVERY SIX (6) HOURS AS NEEDED FOR PAIN FOR UP TO 31 DAYS. MAX DAILY AMOUNT: FOUR (4) TABLETS         Please approve or refuse this medication.   Salina Pimentel LPN

## 2024-08-14 DIAGNOSIS — F41.9 ANXIETY DISORDER, UNSPECIFIED TYPE: ICD-10-CM

## 2024-08-14 RX ORDER — LORAZEPAM 0.5 MG/1
TABLET ORAL
Qty: 90 TABLET | Refills: 0 | Status: SHIPPED | OUTPATIENT
Start: 2024-08-17 | End: 2024-09-17

## 2024-08-14 NOTE — TELEPHONE ENCOUNTER
Eleazar ROSALES [place] called to request a refill on his medication.      Last office visit : 6/28/2024   Next office visit : 10/11/2024     Last UDS:   Benzodiazepine Screen, Urine   Date Value Ref Range Status   03/28/2024 POS  Final     Buprenorphine Urine   Date Value Ref Range Status   03/28/2024 NEG  Final     Cocaine Metabolite Screen, Urine   Date Value Ref Range Status   03/28/2024 NEG  Final     Gabapentin Screen, Urine   Date Value Ref Range Status   03/28/2024 NA  Final     Oxycodone Screen, Ur   Date Value Ref Range Status   03/28/2024 POS  Final     Propoxyphene Screen, Urine   Date Value Ref Range Status   03/28/2024 NEG  Final     THC Screen, Urine   Date Value Ref Range Status   03/28/2024 NEG  Final     Tricyclic Antidepressants, Urine   Date Value Ref Range Status   03/28/2024 NEG  Final       Last Bryan: 08/13/2024  Medication Contract: 06/28/2024     Requested Prescriptions     Pending Prescriptions Disp Refills    LORazepam (ATIVAN) 0.5 MG tablet [Pharmacy Med Name: LORAZEPAM 0.5MG TABLET] 90 tablet 0     Sig: TAKE ONE (1) TABLET BY MOUTH EVERY 8 HOURS AS NEEDED FOR ANXIETY         Please approve or refuse this medication.   Salina Pimentel LPN

## 2024-08-27 DIAGNOSIS — F41.9 ANXIETY: ICD-10-CM

## 2024-08-27 RX ORDER — DIAZEPAM 10 MG
TABLET ORAL
Qty: 60 TABLET | Refills: 0 | OUTPATIENT
Start: 2024-08-27

## 2024-08-27 NOTE — TELEPHONE ENCOUNTER
Eleazar ROSALES Dexter called to request a refill on his medication.      Last office visit : 6/28/2024   Next office visit : 10/11/2024     Last UDS:   Benzodiazepine Screen, Urine   Date Value Ref Range Status   03/28/2024 POS  Final     Buprenorphine Urine   Date Value Ref Range Status   03/28/2024 NEG  Final     Cocaine Metabolite Screen, Urine   Date Value Ref Range Status   03/28/2024 NEG  Final     Gabapentin Screen, Urine   Date Value Ref Range Status   03/28/2024 NA  Final     Oxycodone Screen, Ur   Date Value Ref Range Status   03/28/2024 POS  Final     Propoxyphene Screen, Urine   Date Value Ref Range Status   03/28/2024 NEG  Final     THC Screen, Urine   Date Value Ref Range Status   03/28/2024 NEG  Final     Tricyclic Antidepressants, Urine   Date Value Ref Range Status   03/28/2024 NEG  Final       Last Bryan: 08/14/2024   Medication Contract: 06/28/2024     Requested Prescriptions     Pending Prescriptions Disp Refills    diazePAM (VALIUM) 10 MG tablet [Pharmacy Med Name: DIAZEPAM 10MG TABLET] 60 tablet 0     Sig: TAKE ONE (1) TABLET BY MOUTH TWICE A DAY AS NEEDED FOR ANXIETY         Please approve or refuse this medication.   Salina Pimentel LPN

## 2024-08-29 DIAGNOSIS — F41.9 ANXIETY: ICD-10-CM

## 2024-08-29 RX ORDER — DIAZEPAM 10 MG
TABLET ORAL
Qty: 60 TABLET | Refills: 1 | Status: SHIPPED | OUTPATIENT
Start: 2024-08-31 | End: 2024-09-30

## 2024-08-29 NOTE — TELEPHONE ENCOUNTER
Eleazar ROSALES Dex[place] called to request a refill on his medication.      Last office visit : 6/28/2024   Next office visit : 10/11/2024     Last UDS:   Benzodiazepine Screen, Urine   Date Value Ref Range Status   03/28/2024 POS  Final     Buprenorphine Urine   Date Value Ref Range Status   03/28/2024 NEG  Final     Cocaine Metabolite Screen, Urine   Date Value Ref Range Status   03/28/2024 NEG  Final     Gabapentin Screen, Urine   Date Value Ref Range Status   03/28/2024 NA  Final     Oxycodone Screen, Ur   Date Value Ref Range Status   03/28/2024 POS  Final     Propoxyphene Screen, Urine   Date Value Ref Range Status   03/28/2024 NEG  Final     THC Screen, Urine   Date Value Ref Range Status   03/28/2024 NEG  Final     Tricyclic Antidepressants, Urine   Date Value Ref Range Status   03/28/2024 NEG  Final       Last Bryan: 08/14/2024  Medication Contract: 06/28/2024     Requested Prescriptions     Pending Prescriptions Disp Refills    diazePAM (VALIUM) 10 MG tablet [Pharmacy Med Name: DIAZEPAM 10MG TABLET] 60 tablet 1     Sig: TAKE ONE (1) TABLET BY MOUTH TWICE A DAY AS NEEDED FOR ANXIETY         Please approve or refuse this medication.   Salina Pimentel LPN

## 2024-09-05 DIAGNOSIS — F41.9 ANXIETY DISORDER, UNSPECIFIED TYPE: ICD-10-CM

## 2024-09-05 RX ORDER — PROPRANOLOL HYDROCHLORIDE 20 MG/1
TABLET ORAL
Qty: 180 TABLET | Refills: 1 | Status: SHIPPED | OUTPATIENT
Start: 2024-09-05

## 2024-09-10 DIAGNOSIS — F41.9 ANXIETY DISORDER, UNSPECIFIED TYPE: ICD-10-CM

## 2024-09-10 RX ORDER — LORAZEPAM 0.5 MG/1
TABLET ORAL
Qty: 90 TABLET | Refills: 0 | Status: SHIPPED | OUTPATIENT
Start: 2024-09-14 | End: 2024-10-13

## 2024-09-12 DIAGNOSIS — M54.50 CHRONIC LOW BACK PAIN, UNSPECIFIED BACK PAIN LATERALITY, UNSPECIFIED WHETHER SCIATICA PRESENT: ICD-10-CM

## 2024-09-12 DIAGNOSIS — G89.29 CHRONIC LOW BACK PAIN, UNSPECIFIED BACK PAIN LATERALITY, UNSPECIFIED WHETHER SCIATICA PRESENT: ICD-10-CM

## 2024-09-12 RX ORDER — OXYCODONE AND ACETAMINOPHEN 10; 325 MG/1; MG/1
1 TABLET ORAL EVERY 6 HOURS PRN
Qty: 120 TABLET | Refills: 0 | Status: SHIPPED | OUTPATIENT
Start: 2024-09-14 | End: 2024-10-14

## 2024-09-30 DIAGNOSIS — F41.9 ANXIETY: ICD-10-CM

## 2024-09-30 RX ORDER — DIAZEPAM 10 MG
TABLET ORAL
Qty: 60 TABLET | Refills: 1 | OUTPATIENT
Start: 2024-09-30

## 2024-10-10 DIAGNOSIS — D50.9 IRON DEFICIENCY ANEMIA, UNSPECIFIED IRON DEFICIENCY ANEMIA TYPE: ICD-10-CM

## 2024-10-10 DIAGNOSIS — F41.9 ANXIETY DISORDER, UNSPECIFIED TYPE: ICD-10-CM

## 2024-10-10 LAB
ALBUMIN SERPL-MCNC: 4.5 G/DL (ref 3.5–5.2)
ALP SERPL-CCNC: 77 U/L (ref 40–129)
ALT SERPL-CCNC: 32 U/L (ref 5–41)
ANION GAP SERPL CALCULATED.3IONS-SCNC: 10 MMOL/L (ref 7–19)
AST SERPL-CCNC: 25 U/L (ref 5–40)
BASOPHILS # BLD: 0.1 K/UL (ref 0–0.2)
BASOPHILS NFR BLD: 1.2 % (ref 0–1)
BILIRUB SERPL-MCNC: 0.3 MG/DL (ref 0.2–1.2)
BUN SERPL-MCNC: 27 MG/DL (ref 6–20)
CALCIUM SERPL-MCNC: 9.8 MG/DL (ref 8.6–10)
CHLORIDE SERPL-SCNC: 101 MMOL/L (ref 98–111)
CO2 SERPL-SCNC: 29 MMOL/L (ref 22–29)
CREAT SERPL-MCNC: 1.2 MG/DL (ref 0.7–1.2)
EOSINOPHIL # BLD: 0.3 K/UL (ref 0–0.6)
EOSINOPHIL NFR BLD: 3.4 % (ref 0–5)
ERYTHROCYTE [DISTWIDTH] IN BLOOD BY AUTOMATED COUNT: 13.4 % (ref 11.5–14.5)
GLUCOSE SERPL-MCNC: 105 MG/DL (ref 70–99)
HCT VFR BLD AUTO: 42.7 % (ref 42–52)
HGB BLD-MCNC: 13 G/DL (ref 14–18)
IMM GRANULOCYTES # BLD: 0 K/UL
LYMPHOCYTES # BLD: 2.2 K/UL (ref 1.1–4.5)
LYMPHOCYTES NFR BLD: 29.4 % (ref 20–40)
MCH RBC QN AUTO: 29 PG (ref 27–31)
MCHC RBC AUTO-ENTMCNC: 30.4 G/DL (ref 33–37)
MCV RBC AUTO: 95.3 FL (ref 80–94)
MONOCYTES # BLD: 0.7 K/UL (ref 0–0.9)
MONOCYTES NFR BLD: 8.6 % (ref 0–10)
NEUTROPHILS # BLD: 4.3 K/UL (ref 1.5–7.5)
NEUTS SEG NFR BLD: 57.1 % (ref 50–65)
PLATELET # BLD AUTO: 289 K/UL (ref 130–400)
PMV BLD AUTO: 9.3 FL (ref 9.4–12.4)
POTASSIUM SERPL-SCNC: 5.4 MMOL/L (ref 3.5–5)
PROT SERPL-MCNC: 7.4 G/DL (ref 6.4–8.3)
RBC # BLD AUTO: 4.48 M/UL (ref 4.7–6.1)
SODIUM SERPL-SCNC: 140 MMOL/L (ref 136–145)
WBC # BLD AUTO: 7.5 K/UL (ref 4.8–10.8)

## 2024-10-11 ENCOUNTER — OFFICE VISIT (OUTPATIENT)
Dept: INTERNAL MEDICINE | Age: 54
End: 2024-10-11

## 2024-10-11 VITALS
BODY MASS INDEX: 23.05 KG/M2 | HEART RATE: 67 BPM | HEIGHT: 72 IN | DIASTOLIC BLOOD PRESSURE: 73 MMHG | WEIGHT: 170.2 LBS | SYSTOLIC BLOOD PRESSURE: 130 MMHG | OXYGEN SATURATION: 98 %

## 2024-10-11 DIAGNOSIS — Z91.09 ENVIRONMENTAL ALLERGIES: ICD-10-CM

## 2024-10-11 DIAGNOSIS — M54.2 NECK PAIN: ICD-10-CM

## 2024-10-11 DIAGNOSIS — K21.9 GASTROESOPHAGEAL REFLUX DISEASE WITHOUT ESOPHAGITIS: ICD-10-CM

## 2024-10-11 DIAGNOSIS — M54.50 CHRONIC LOW BACK PAIN, UNSPECIFIED BACK PAIN LATERALITY, UNSPECIFIED WHETHER SCIATICA PRESENT: ICD-10-CM

## 2024-10-11 DIAGNOSIS — G89.29 CHRONIC LOW BACK PAIN, UNSPECIFIED BACK PAIN LATERALITY, UNSPECIFIED WHETHER SCIATICA PRESENT: ICD-10-CM

## 2024-10-11 DIAGNOSIS — G25.0 BENIGN ESSENTIAL TREMOR: ICD-10-CM

## 2024-10-11 DIAGNOSIS — F41.9 ANXIETY DISORDER, UNSPECIFIED TYPE: ICD-10-CM

## 2024-10-11 DIAGNOSIS — Z79.899 HIGH RISK MEDICATION USE: Primary | ICD-10-CM

## 2024-10-11 LAB
ALCOHOL URINE: NORMAL
AMPHETAMINE SCREEN URINE: NORMAL
BARBITURATE SCREEN URINE: NORMAL
BENZODIAZEPINE SCREEN, URINE: NORMAL
BUPRENORPHINE URINE: NORMAL
COCAINE METABOLITE SCREEN URINE: NORMAL
FENTANYL SCREEN, URINE: NORMAL
GABAPENTIN SCREEN, URINE: NORMAL
MDMA, URINE: NORMAL
METHADONE SCREEN, URINE: NORMAL
METHAMPHETAMINE, URINE: NORMAL
OPIATE SCREEN URINE: NORMAL
OXYCODONE SCREEN URINE: NORMAL
PHENCYCLIDINE SCREEN URINE: NORMAL
PROPOXYPHENE SCREEN, URINE: NORMAL
SYNTHETIC CANNABINOIDS(K2) SCREEN, URINE: NORMAL
THC SCREEN, URINE: NORMAL
TRAMADOL SCREEN URINE: NORMAL
TRICYCLIC ANTIDEPRESSANTS, UR: NORMAL

## 2024-10-11 PROCEDURE — 80305 DRUG TEST PRSMV DIR OPT OBS: CPT | Performed by: INTERNAL MEDICINE

## 2024-10-11 PROCEDURE — 99213 OFFICE O/P EST LOW 20 MIN: CPT | Performed by: INTERNAL MEDICINE

## 2024-10-11 RX ORDER — LORAZEPAM 0.5 MG/1
TABLET ORAL
Qty: 90 TABLET | Refills: 0 | Status: SHIPPED | OUTPATIENT
Start: 2024-10-11 | End: 2024-11-11

## 2024-10-11 NOTE — TELEPHONE ENCOUNTER
Eleazar ROSALES [place] called to request a refill on his medication.      Last office visit : 6/28/2024   Next office visit : 10/11/2024     Last UDS:   Benzodiazepine Screen, Urine   Date Value Ref Range Status   03/28/2024 POS  Final     Buprenorphine Urine   Date Value Ref Range Status   03/28/2024 NEG  Final     Cocaine Metabolite Screen, Urine   Date Value Ref Range Status   03/28/2024 NEG  Final     Gabapentin Screen, Urine   Date Value Ref Range Status   03/28/2024 NA  Final     Oxycodone Screen, Ur   Date Value Ref Range Status   03/28/2024 POS  Final     Propoxyphene Screen, Urine   Date Value Ref Range Status   03/28/2024 NEG  Final     THC Screen, Urine   Date Value Ref Range Status   03/28/2024 NEG  Final     Tricyclic Antidepressants, Urine   Date Value Ref Range Status   03/28/2024 NEG  Final       Last Bryan: 09/12/2024  Medication Contract: 06/28/2024     Requested Prescriptions     Pending Prescriptions Disp Refills    LORazepam (ATIVAN) 0.5 MG tablet [Pharmacy Med Name: LORAZEPAM 0.5MG TABLET] 90 tablet 0     Sig: TAKE ONE (1) TABLET BY MOUTH EVERY 8 HOURS AS NEEDED FOR ANXIETY         Please approve or refuse this medication.   Salina Pimentel LPN

## 2024-10-11 NOTE — PROGRESS NOTES
sounds: Normal breath sounds. No stridor. No wheezing, rhonchi or rales.   Chest:      Chest wall: No tenderness.   Abdominal:      General: Bowel sounds are normal. There is no distension.      Palpations: Abdomen is soft. There is no mass.      Tenderness: There is no abdominal tenderness. There is no right CVA tenderness, left CVA tenderness, guarding or rebound.      Hernia: No hernia is present.   Musculoskeletal:         General: Tenderness present. No swelling, deformity or signs of injury. Normal range of motion.      Cervical back: Normal range of motion and neck supple. No rigidity. No muscular tenderness.      Right lower leg: No edema.      Left lower leg: No edema.      Comments: Gait antalgic. There is tenderness over the lower lumbar spine   Lymphadenopathy:      Cervical: No cervical adenopathy.   Skin:     General: Skin is warm and dry.      Capillary Refill: Capillary refill takes less than 2 seconds.      Coloration: Skin is not jaundiced or pale.      Findings: No bruising, erythema, lesion or rash.   Neurological:      General: No focal deficit present.      Mental Status: He is alert and oriented to person, place, and time. Mental status is at baseline.      Cranial Nerves: No cranial nerve deficit.      Sensory: No sensory deficit.      Motor: No weakness or abnormal muscle tone.      Coordination: Coordination normal.      Gait: Gait normal.      Deep Tendon Reflexes: Reflexes are normal and symmetric. Reflexes normal.      Comments: Tremor to hands noted   Psychiatric:         Mood and Affect: Mood normal.         Behavior: Behavior normal.         Thought Content: Thought content normal.         Judgment: Judgment normal.         1. High risk medication use    2. Neck pain    3. Chronic low back pain, unspecified back pain laterality, unspecified whether sciatica present    4. Anxiety disorder, unspecified type    5. Benign essential tremor    6. Environmental allergies    7.

## 2024-10-14 DIAGNOSIS — M54.50 CHRONIC LOW BACK PAIN, UNSPECIFIED BACK PAIN LATERALITY, UNSPECIFIED WHETHER SCIATICA PRESENT: ICD-10-CM

## 2024-10-14 DIAGNOSIS — G89.29 CHRONIC LOW BACK PAIN, UNSPECIFIED BACK PAIN LATERALITY, UNSPECIFIED WHETHER SCIATICA PRESENT: ICD-10-CM

## 2024-10-14 RX ORDER — OXYCODONE AND ACETAMINOPHEN 10; 325 MG/1; MG/1
1 TABLET ORAL EVERY 6 HOURS PRN
Qty: 120 TABLET | Refills: 0 | Status: SHIPPED | OUTPATIENT
Start: 2024-10-16 | End: 2024-10-14 | Stop reason: SDUPTHER

## 2024-10-14 RX ORDER — OXYCODONE AND ACETAMINOPHEN 10; 325 MG/1; MG/1
1 TABLET ORAL EVERY 6 HOURS PRN
Qty: 120 TABLET | Refills: 0 | Status: SHIPPED | OUTPATIENT
Start: 2024-10-16 | End: 2024-11-15

## 2024-10-14 NOTE — TELEPHONE ENCOUNTER
Eleazar Renteria called to request a refill on his medication.      Last office visit : 10/11/2024   Next office visit : 1/13/2025     Last UDS:   Benzodiazepine Screen, Urine   Date Value Ref Range Status   10/11/2024 pos  Final     Buprenorphine Urine   Date Value Ref Range Status   10/11/2024 neg  Final     Cocaine Metabolite Screen, Urine   Date Value Ref Range Status   10/11/2024 neg  Final     Gabapentin Screen, Urine   Date Value Ref Range Status   10/11/2024 na  Final     Oxycodone Screen, Ur   Date Value Ref Range Status   10/11/2024 pos  Final     Propoxyphene Screen, Urine   Date Value Ref Range Status   10/11/2024 neg  Final     THC Screen, Urine   Date Value Ref Range Status   10/11/2024 neg  Final     Tricyclic Antidepressants, Urine   Date Value Ref Range Status   10/11/2024 neg  Final       Last Bryan: 10/11/2024  Medication Contract: 06/28/2024     Requested Prescriptions     Pending Prescriptions Disp Refills    oxyCODONE-acetaminophen (PERCOCET)  MG per tablet [Pharmacy Med Name: OXYCODONE/ACETAMINOPHEN 10-325MG TABLET] 120 tablet 0     Sig: TAKE ONE (1) TABLET BY MOUTH EVERY SIX (6) HOURS AS NEEDED FOR PAIN FOR UP TO 30 DAYS. MAX DAILY AMOUNT: FOUR (4) TABLETS         Please approve or refuse this medication.   Salina Pimentel LPN

## 2024-10-16 SDOH — ECONOMIC STABILITY: FOOD INSECURITY: WITHIN THE PAST 12 MONTHS, YOU WORRIED THAT YOUR FOOD WOULD RUN OUT BEFORE YOU GOT MONEY TO BUY MORE.: NEVER TRUE

## 2024-10-16 SDOH — ECONOMIC STABILITY: FOOD INSECURITY: WITHIN THE PAST 12 MONTHS, THE FOOD YOU BOUGHT JUST DIDN'T LAST AND YOU DIDN'T HAVE MONEY TO GET MORE.: NEVER TRUE

## 2024-10-16 SDOH — ECONOMIC STABILITY: INCOME INSECURITY: HOW HARD IS IT FOR YOU TO PAY FOR THE VERY BASICS LIKE FOOD, HOUSING, MEDICAL CARE, AND HEATING?: NOT HARD AT ALL

## 2024-10-16 ASSESSMENT — ENCOUNTER SYMPTOMS
TROUBLE SWALLOWING: 0
EYE REDNESS: 0
DIARRHEA: 0
VOICE CHANGE: 0
COUGH: 0
WHEEZING: 0
SORE THROAT: 0
BLOOD IN STOOL: 0
NAUSEA: 1
ABDOMINAL PAIN: 0
EYE ITCHING: 0
RHINORRHEA: 0
COLOR CHANGE: 0
SINUS PAIN: 0
CONSTIPATION: 0
PHOTOPHOBIA: 0
SINUS PRESSURE: 0
VOMITING: 0
BACK PAIN: 1
EYE DISCHARGE: 0
SHORTNESS OF BREATH: 0
CHEST TIGHTNESS: 0
EYE PAIN: 0
ABDOMINAL DISTENTION: 0

## 2024-10-16 ASSESSMENT — PATIENT HEALTH QUESTIONNAIRE - PHQ9
SUM OF ALL RESPONSES TO PHQ QUESTIONS 1-9: 0
1. LITTLE INTEREST OR PLEASURE IN DOING THINGS: NOT AT ALL
SUM OF ALL RESPONSES TO PHQ QUESTIONS 1-9: 0
2. FEELING DOWN, DEPRESSED OR HOPELESS: NOT AT ALL
SUM OF ALL RESPONSES TO PHQ QUESTIONS 1-9: 0
SUM OF ALL RESPONSES TO PHQ QUESTIONS 1-9: 0
SUM OF ALL RESPONSES TO PHQ9 QUESTIONS 1 & 2: 0

## 2024-10-28 DIAGNOSIS — F41.9 ANXIETY: ICD-10-CM

## 2024-10-28 RX ORDER — DIAZEPAM 10 MG/1
TABLET ORAL
Qty: 60 TABLET | Refills: 0 | Status: SHIPPED | OUTPATIENT
Start: 2024-10-30 | End: 2024-11-30

## 2024-10-28 NOTE — TELEPHONE ENCOUNTER
Eleazar ROSALES [place] called to request a refill on his medication.      Last office visit : 10/11/2024   Next office visit : 1/13/2025     Last UDS:   Benzodiazepine Screen, Urine   Date Value Ref Range Status   10/11/2024 pos  Final     Buprenorphine Urine   Date Value Ref Range Status   10/11/2024 neg  Final     Cocaine Metabolite Screen, Urine   Date Value Ref Range Status   10/11/2024 neg  Final     Gabapentin Screen, Urine   Date Value Ref Range Status   10/11/2024 na  Final     Oxycodone Screen, Ur   Date Value Ref Range Status   10/11/2024 pos  Final     Propoxyphene Screen, Urine   Date Value Ref Range Status   10/11/2024 neg  Final     THC Screen, Urine   Date Value Ref Range Status   10/11/2024 neg  Final     Tricyclic Antidepressants, Urine   Date Value Ref Range Status   10/11/2024 neg  Final       Last Bryan: 10/14/2024  Medication Contract: 06/28/2024     Requested Prescriptions     Pending Prescriptions Disp Refills    diazePAM (VALIUM) 10 MG tablet [Pharmacy Med Name: DIAZEPAM 10MG TABLET] 60 tablet 1     Sig: TAKE ONE (1) TABLET BY MOUTH TWICE A DAY AS NEEDED FOR ANXIETY         Please approve or refuse this medication.   Salina Pimentel LPN

## 2024-10-31 DIAGNOSIS — K21.9 GASTROESOPHAGEAL REFLUX DISEASE WITHOUT ESOPHAGITIS: ICD-10-CM

## 2024-10-31 DIAGNOSIS — F41.9 ANXIETY DISORDER, UNSPECIFIED TYPE: ICD-10-CM

## 2024-10-31 RX ORDER — PROPRANOLOL HCL 20 MG
TABLET ORAL
Qty: 180 TABLET | Refills: 1 | Status: SHIPPED | OUTPATIENT
Start: 2024-10-31

## 2024-10-31 RX ORDER — PROMETHAZINE HYDROCHLORIDE 25 MG/1
TABLET ORAL
Qty: 90 TABLET | Refills: 1 | Status: SHIPPED | OUTPATIENT
Start: 2024-10-31

## 2024-11-07 DIAGNOSIS — F41.9 ANXIETY DISORDER, UNSPECIFIED TYPE: ICD-10-CM

## 2024-11-07 RX ORDER — LORAZEPAM 0.5 MG/1
TABLET ORAL
Qty: 90 TABLET | Refills: 0 | Status: SHIPPED | OUTPATIENT
Start: 2024-11-13 | End: 2024-12-12

## 2024-11-07 NOTE — TELEPHONE ENCOUNTER
Eleazar ROSALES [place] called to request a refill on his medication.      Last office visit : 10/11/2024   Next office visit : 1/13/2025     Last UDS:   Benzodiazepine Screen, Urine   Date Value Ref Range Status   10/11/2024 pos  Final     Buprenorphine Urine   Date Value Ref Range Status   10/11/2024 neg  Final     Cocaine Metabolite Screen, Urine   Date Value Ref Range Status   10/11/2024 neg  Final     Gabapentin Screen, Urine   Date Value Ref Range Status   10/11/2024 na  Final     Oxycodone Screen, Ur   Date Value Ref Range Status   10/11/2024 pos  Final     Propoxyphene Screen, Urine   Date Value Ref Range Status   10/11/2024 neg  Final     THC Screen, Urine   Date Value Ref Range Status   10/11/2024 neg  Final     Tricyclic Antidepressants, Urine   Date Value Ref Range Status   10/11/2024 neg  Final       Last Bryan: 10/28/2024  Medication Contract: 06/28/2024     Requested Prescriptions     Pending Prescriptions Disp Refills    LORazepam (ATIVAN) 0.5 MG tablet [Pharmacy Med Name: LORAZEPAM 0.5MG TABLET] 90 tablet 0     Sig: TAKE ONE (1) TABLET BY MOUTH EVERY 8 HOURS AS NEEDED FOR ANXIETY         Please approve or refuse this medication.   Salina Pimentel LPN

## 2024-11-15 DIAGNOSIS — M54.50 CHRONIC LOW BACK PAIN, UNSPECIFIED BACK PAIN LATERALITY, UNSPECIFIED WHETHER SCIATICA PRESENT: ICD-10-CM

## 2024-11-15 DIAGNOSIS — G89.29 CHRONIC LOW BACK PAIN, UNSPECIFIED BACK PAIN LATERALITY, UNSPECIFIED WHETHER SCIATICA PRESENT: ICD-10-CM

## 2024-11-15 RX ORDER — OXYCODONE AND ACETAMINOPHEN 10; 325 MG/1; MG/1
1 TABLET ORAL EVERY 6 HOURS PRN
Qty: 120 TABLET | Refills: 0 | Status: SHIPPED | OUTPATIENT
Start: 2024-11-15 | End: 2024-12-15

## 2024-11-15 NOTE — TELEPHONE ENCOUNTER
Eleazar Renteria called to request a refill on his medication.      Last office visit : 10/11/2024   Next office visit : 1/13/2025     Last UDS:   Benzodiazepine Screen, Urine   Date Value Ref Range Status   10/11/2024 pos  Final     Buprenorphine Urine   Date Value Ref Range Status   10/11/2024 neg  Final     Cocaine Metabolite Screen, Urine   Date Value Ref Range Status   10/11/2024 neg  Final     Gabapentin Screen, Urine   Date Value Ref Range Status   10/11/2024 na  Final     Oxycodone Screen, Ur   Date Value Ref Range Status   10/11/2024 pos  Final     Propoxyphene Screen, Urine   Date Value Ref Range Status   10/11/2024 neg  Final     THC Screen, Urine   Date Value Ref Range Status   10/11/2024 neg  Final     Tricyclic Antidepressants, Urine   Date Value Ref Range Status   10/11/2024 neg  Final       Last Bryan: 11/07/2024  Medication Contract: 06/28/2024     Requested Prescriptions     Pending Prescriptions Disp Refills    oxyCODONE-acetaminophen (PERCOCET)  MG per tablet [Pharmacy Med Name: OXYCODONE/ACETAMINOPHEN 10-325MG TABLET] 120 tablet 0     Sig: TAKE ONE (1) TABLET BY MOUTH EVERY SIX (6) HOURS AS NEEDED FOR PAIN FOR UP TO 30 DAYS. MAX DAILY AMOUNT: FOUR (4) TABLETS         Please approve or refuse this medication.   Salina Pimentel LPN

## 2024-11-26 DIAGNOSIS — F41.9 ANXIETY: ICD-10-CM

## 2024-11-26 RX ORDER — DIAZEPAM 10 MG/1
TABLET ORAL
Qty: 60 TABLET | Refills: 0 | Status: SHIPPED | OUTPATIENT
Start: 2024-11-27 | End: 2024-12-26

## 2024-11-26 NOTE — TELEPHONE ENCOUNTER
Eleazar ROSALES [place] called to request a refill on his medication.      Last office visit : 10/11/2024   Next office visit : 1/13/2025     Last UDS:   Benzodiazepine Screen, Urine   Date Value Ref Range Status   10/11/2024 pos  Final     Buprenorphine Urine   Date Value Ref Range Status   10/11/2024 neg  Final     Cocaine Metabolite Screen, Urine   Date Value Ref Range Status   10/11/2024 neg  Final     Gabapentin Screen, Urine   Date Value Ref Range Status   10/11/2024 na  Final     Oxycodone Screen, Ur   Date Value Ref Range Status   10/11/2024 pos  Final     Propoxyphene Screen, Urine   Date Value Ref Range Status   10/11/2024 neg  Final     THC Screen, Urine   Date Value Ref Range Status   10/11/2024 neg  Final     Tricyclic Antidepressants, Urine   Date Value Ref Range Status   10/11/2024 neg  Final       Last Bryan: 11/15/2024  Medication Contract: 06/28/2024     Requested Prescriptions     Pending Prescriptions Disp Refills    diazePAM (VALIUM) 10 MG tablet [Pharmacy Med Name: DIAZEPAM 10MG TABLET] 60 tablet 0     Sig: TAKE ONE (1) TABLET BY MOUTH TWICE A DAY AS NEEDED FOR ANXIETY         Please approve or refuse this medication.   Salina Pimentel LPN

## 2024-12-06 DIAGNOSIS — F41.9 ANXIETY DISORDER, UNSPECIFIED TYPE: ICD-10-CM

## 2024-12-09 RX ORDER — LORAZEPAM 0.5 MG/1
TABLET ORAL
Qty: 90 TABLET | Refills: 0 | Status: SHIPPED | OUTPATIENT
Start: 2024-12-11 | End: 2025-01-10

## 2024-12-09 NOTE — TELEPHONE ENCOUNTER
Eleazar ROSALES [place] called to request a refill on his medication.      Last office visit : 10/11/2024   Next office visit : 1/13/2025     Last UDS:   Benzodiazepine Screen, Urine   Date Value Ref Range Status   10/11/2024 pos  Final     Buprenorphine Urine   Date Value Ref Range Status   10/11/2024 neg  Final     Cocaine Metabolite Screen, Urine   Date Value Ref Range Status   10/11/2024 neg  Final     Gabapentin Screen, Urine   Date Value Ref Range Status   10/11/2024 na  Final     Oxycodone Screen, Ur   Date Value Ref Range Status   10/11/2024 pos  Final     Propoxyphene Screen, Urine   Date Value Ref Range Status   10/11/2024 neg  Final     THC Screen, Urine   Date Value Ref Range Status   10/11/2024 neg  Final     Tricyclic Antidepressants, Urine   Date Value Ref Range Status   10/11/2024 neg  Final       Last Bryan: 11/26/2024  Medication Contract: 06/28/2024     Requested Prescriptions     Pending Prescriptions Disp Refills    LORazepam (ATIVAN) 0.5 MG tablet [Pharmacy Med Name: LORAZEPAM 0.5MG TABLET] 90 tablet 0     Sig: TAKE ONE (1) TABLET BY MOUTH EVERY 8 HOURS AS NEEDED FOR ANXIETY         Please approve or refuse this medication.   Salina Pimentel LPN

## 2024-12-16 DIAGNOSIS — G89.29 CHRONIC LOW BACK PAIN, UNSPECIFIED BACK PAIN LATERALITY, UNSPECIFIED WHETHER SCIATICA PRESENT: ICD-10-CM

## 2024-12-16 DIAGNOSIS — M54.50 CHRONIC LOW BACK PAIN, UNSPECIFIED BACK PAIN LATERALITY, UNSPECIFIED WHETHER SCIATICA PRESENT: ICD-10-CM

## 2024-12-16 RX ORDER — OXYCODONE AND ACETAMINOPHEN 10; 325 MG/1; MG/1
1 TABLET ORAL EVERY 6 HOURS PRN
Qty: 120 TABLET | Refills: 0 | Status: SHIPPED | OUTPATIENT
Start: 2024-12-16 | End: 2025-01-15

## 2024-12-16 NOTE — TELEPHONE ENCOUNTER
Eleazar ROSALES [place] called to request a refill on his medication.      Last office visit : 10/11/2024   Next office visit : 1/13/2025     Last UDS:   Benzodiazepine Screen, Urine   Date Value Ref Range Status   10/11/2024 pos  Final     Buprenorphine Urine   Date Value Ref Range Status   10/11/2024 neg  Final     Cocaine Metabolite Screen, Urine   Date Value Ref Range Status   10/11/2024 neg  Final     Gabapentin Screen, Urine   Date Value Ref Range Status   10/11/2024 na  Final     Oxycodone Screen, Ur   Date Value Ref Range Status   10/11/2024 pos  Final     Propoxyphene Screen, Urine   Date Value Ref Range Status   10/11/2024 neg  Final     THC Screen, Urine   Date Value Ref Range Status   10/11/2024 neg  Final     Tricyclic Antidepressants, Urine   Date Value Ref Range Status   10/11/2024 neg  Final       Last Bryan: 12/9/2024  Medication Contract: 06/28/2024     Requested Prescriptions     Pending Prescriptions Disp Refills    oxyCODONE-acetaminophen (PERCOCET)  MG per tablet 120 tablet 0     Sig: Take 1 tablet by mouth every 6 hours as needed for Pain for up to 30 days. Max Daily Amount: 4 tablets         Please approve or refuse this medication.   Salina Pimentel LPN

## 2024-12-26 DIAGNOSIS — F41.9 ANXIETY: ICD-10-CM

## 2024-12-26 RX ORDER — DIAZEPAM 10 MG/1
TABLET ORAL
Qty: 60 TABLET | Refills: 0 | Status: SHIPPED | OUTPATIENT
Start: 2024-12-26 | End: 2025-01-26

## 2024-12-26 NOTE — TELEPHONE ENCOUNTER
Eleazar ROSALES [place] called to request a refill on his medication.      Last office visit : 10/11/2024   Next office visit : 1/13/2025     Last UDS:   Benzodiazepine Screen, Urine   Date Value Ref Range Status   10/11/2024 pos  Final     Buprenorphine Urine   Date Value Ref Range Status   10/11/2024 neg  Final     Cocaine Metabolite Screen, Urine   Date Value Ref Range Status   10/11/2024 neg  Final     Gabapentin Screen, Urine   Date Value Ref Range Status   10/11/2024 na  Final     Oxycodone Screen, Ur   Date Value Ref Range Status   10/11/2024 pos  Final     Propoxyphene Screen, Urine   Date Value Ref Range Status   10/11/2024 neg  Final     THC Screen, Urine   Date Value Ref Range Status   10/11/2024 neg  Final     Tricyclic Antidepressants, Urine   Date Value Ref Range Status   10/11/2024 neg  Final       Last Bryan: 12/16/2024  Medication Contract: 06/28/2024     Requested Prescriptions     Pending Prescriptions Disp Refills    diazePAM (VALIUM) 10 MG tablet [Pharmacy Med Name: DIAZEPAM 10MG TABLET] 60 tablet 0     Sig: TAKE ONE (1) TABLET BY MOUTH TWICE A DAY AS NEEDED FOR ANXIETY         Please approve or refuse this medication.   Salina Pimentel LPN

## 2025-01-07 DIAGNOSIS — F41.9 ANXIETY DISORDER, UNSPECIFIED TYPE: ICD-10-CM

## 2025-01-07 RX ORDER — LORAZEPAM 0.5 MG/1
TABLET ORAL
Qty: 90 TABLET | Refills: 0 | Status: SHIPPED | OUTPATIENT
Start: 2025-01-08 | End: 2025-02-07

## 2025-01-07 NOTE — TELEPHONE ENCOUNTER
Eleazar ROSALES [place] called to request a refill on his medication.      Last office visit : 10/11/2024   Next office visit : 1/13/2025     Last UDS:   Benzodiazepine Screen, Urine   Date Value Ref Range Status   10/11/2024 pos  Final     Buprenorphine Urine   Date Value Ref Range Status   10/11/2024 neg  Final     Cocaine Metabolite Screen, Urine   Date Value Ref Range Status   10/11/2024 neg  Final     Gabapentin Screen, Urine   Date Value Ref Range Status   10/11/2024 na  Final     Oxycodone Screen, Ur   Date Value Ref Range Status   10/11/2024 pos  Final     Propoxyphene Screen, Urine   Date Value Ref Range Status   10/11/2024 neg  Final     THC Screen, Urine   Date Value Ref Range Status   10/11/2024 neg  Final     Tricyclic Antidepressants, Urine   Date Value Ref Range Status   10/11/2024 neg  Final       Last Bryan: 12/26/2024  Medication Contract: 06/28/2024     Requested Prescriptions     Pending Prescriptions Disp Refills    LORazepam (ATIVAN) 0.5 MG tablet [Pharmacy Med Name: LORAZEPAM 0.5MG TABLET] 90 tablet 0     Sig: TAKE ONE (1) TABLET BY MOUTH EVERY 8 HOURS AS NEEDED FOR ANXIETY         Please approve or refuse this medication.   Salina Pimentel LPN

## 2025-01-08 DIAGNOSIS — G89.29 CHRONIC LOW BACK PAIN, UNSPECIFIED BACK PAIN LATERALITY, UNSPECIFIED WHETHER SCIATICA PRESENT: ICD-10-CM

## 2025-01-08 DIAGNOSIS — M54.50 CHRONIC LOW BACK PAIN, UNSPECIFIED BACK PAIN LATERALITY, UNSPECIFIED WHETHER SCIATICA PRESENT: ICD-10-CM

## 2025-01-08 NOTE — TELEPHONE ENCOUNTER
Eleazar Renterai called to request a refill on his medication.      Last office visit : 10/11/2024   Next office visit : 1/13/2025     Last UDS:   Benzodiazepine Screen, Urine   Date Value Ref Range Status   10/11/2024 pos  Final     Buprenorphine Urine   Date Value Ref Range Status   10/11/2024 neg  Final     Cocaine Metabolite Screen, Urine   Date Value Ref Range Status   10/11/2024 neg  Final     Gabapentin Screen, Urine   Date Value Ref Range Status   10/11/2024 na  Final     Oxycodone Screen, Ur   Date Value Ref Range Status   10/11/2024 pos  Final     Propoxyphene Screen, Urine   Date Value Ref Range Status   10/11/2024 neg  Final     THC Screen, Urine   Date Value Ref Range Status   10/11/2024 neg  Final     Tricyclic Antidepressants, Urine   Date Value Ref Range Status   10/11/2024 neg  Final       Last Bryan: 1/7/2025  Medication Contract: 06/28/2024     Requested Prescriptions     Pending Prescriptions Disp Refills    oxyCODONE-acetaminophen (PERCOCET)  MG per tablet [Pharmacy Med Name: OXYCODONE/ACETAMINOPHEN 10-325MG TABLET] 120 tablet 0     Sig: TAKE ONE (1) TABLET BY MOUTH EVERY SIX (6) HOURS AS NEEDED FOR PAIN FOR UP TO 30 DAYS. MAX DAILY AMOUNT: FOUR (4) TABLETS         Please approve or refuse this medication.   Salina Pimentel LPN

## 2025-01-09 RX ORDER — OXYCODONE AND ACETAMINOPHEN 10; 325 MG/1; MG/1
1 TABLET ORAL EVERY 6 HOURS PRN
Qty: 120 TABLET | Refills: 0 | Status: SHIPPED | OUTPATIENT
Start: 2025-01-15 | End: 2025-02-14

## 2025-01-13 ENCOUNTER — OFFICE VISIT (OUTPATIENT)
Dept: INTERNAL MEDICINE | Age: 55
End: 2025-01-13

## 2025-01-13 VITALS
HEIGHT: 72 IN | WEIGHT: 177 LBS | HEART RATE: 81 BPM | SYSTOLIC BLOOD PRESSURE: 117 MMHG | DIASTOLIC BLOOD PRESSURE: 78 MMHG | OXYGEN SATURATION: 98 % | BODY MASS INDEX: 23.98 KG/M2

## 2025-01-13 DIAGNOSIS — F41.9 ANXIETY DISORDER, UNSPECIFIED TYPE: Primary | ICD-10-CM

## 2025-01-13 DIAGNOSIS — Z91.09 ENVIRONMENTAL ALLERGIES: ICD-10-CM

## 2025-01-13 DIAGNOSIS — M54.2 NECK PAIN: ICD-10-CM

## 2025-01-13 DIAGNOSIS — R11.0 NAUSEA: ICD-10-CM

## 2025-01-13 DIAGNOSIS — K21.9 GASTROESOPHAGEAL REFLUX DISEASE WITHOUT ESOPHAGITIS: ICD-10-CM

## 2025-01-13 DIAGNOSIS — G89.29 CHRONIC LOW BACK PAIN, UNSPECIFIED BACK PAIN LATERALITY, UNSPECIFIED WHETHER SCIATICA PRESENT: ICD-10-CM

## 2025-01-13 DIAGNOSIS — G25.0 ESSENTIAL TREMOR: ICD-10-CM

## 2025-01-13 DIAGNOSIS — M54.50 CHRONIC LOW BACK PAIN, UNSPECIFIED BACK PAIN LATERALITY, UNSPECIFIED WHETHER SCIATICA PRESENT: ICD-10-CM

## 2025-01-13 DIAGNOSIS — M54.2 CERVICALGIA: ICD-10-CM

## 2025-01-13 LAB
ALBUMIN SERPL-MCNC: 4.5 G/DL (ref 3.5–5.2)
ALCOHOL URINE: NORMAL
ALP SERPL-CCNC: 65 U/L (ref 40–129)
ALT SERPL-CCNC: 22 U/L (ref 5–41)
AMPHETAMINE SCREEN URINE: NORMAL
ANION GAP SERPL CALCULATED.3IONS-SCNC: 9 MMOL/L (ref 7–19)
AST SERPL-CCNC: 25 U/L (ref 5–40)
BARBITURATE SCREEN URINE: NORMAL
BASOPHILS # BLD: 0 K/UL (ref 0–0.2)
BASOPHILS NFR BLD: 0.7 % (ref 0–1)
BENZODIAZEPINE SCREEN, URINE: NORMAL
BILIRUB SERPL-MCNC: 0.2 MG/DL (ref 0.2–1.2)
BUN SERPL-MCNC: 11 MG/DL (ref 6–20)
BUPRENORPHINE URINE: NORMAL
CALCIUM SERPL-MCNC: 9.8 MG/DL (ref 8.6–10)
CHLORIDE SERPL-SCNC: 105 MMOL/L (ref 98–111)
CHOLEST SERPL-MCNC: 145 MG/DL (ref 0–199)
CO2 SERPL-SCNC: 30 MMOL/L (ref 22–29)
COCAINE METABOLITE SCREEN URINE: NORMAL
CREAT SERPL-MCNC: 1 MG/DL (ref 0.7–1.2)
EOSINOPHIL # BLD: 0.1 K/UL (ref 0–0.6)
EOSINOPHIL NFR BLD: 2.4 % (ref 0–5)
ERYTHROCYTE [DISTWIDTH] IN BLOOD BY AUTOMATED COUNT: 13.2 % (ref 11.5–14.5)
FENTANYL SCREEN, URINE: NORMAL
GABAPENTIN SCREEN, URINE: NORMAL
GLUCOSE SERPL-MCNC: 130 MG/DL (ref 70–99)
HCT VFR BLD AUTO: 40.8 % (ref 42–52)
HDLC SERPL-MCNC: 54 MG/DL (ref 40–60)
HGB BLD-MCNC: 13.1 G/DL (ref 14–18)
IMM GRANULOCYTES # BLD: 0 K/UL
LDLC SERPL CALC-MCNC: 74 MG/DL
LYMPHOCYTES # BLD: 1.4 K/UL (ref 1.1–4.5)
LYMPHOCYTES NFR BLD: 23.9 % (ref 20–40)
MCH RBC QN AUTO: 29.4 PG (ref 27–31)
MCHC RBC AUTO-ENTMCNC: 32.1 G/DL (ref 33–37)
MCV RBC AUTO: 91.7 FL (ref 80–94)
MDMA, URINE: NORMAL
METHADONE SCREEN, URINE: NORMAL
METHAMPHETAMINE, URINE: NORMAL
MONOCYTES # BLD: 0.7 K/UL (ref 0–0.9)
MONOCYTES NFR BLD: 11 % (ref 0–10)
NEUTROPHILS # BLD: 3.7 K/UL (ref 1.5–7.5)
NEUTS SEG NFR BLD: 61.8 % (ref 50–65)
OPIATE SCREEN URINE: NORMAL
OXYCODONE SCREEN URINE: NORMAL
PHENCYCLIDINE SCREEN URINE: NORMAL
PLATELET # BLD AUTO: 283 K/UL (ref 130–400)
PMV BLD AUTO: 9.8 FL (ref 9.4–12.4)
POTASSIUM SERPL-SCNC: 4.2 MMOL/L (ref 3.5–5)
PROPOXYPHENE SCREEN, URINE: NORMAL
PROT SERPL-MCNC: 7.3 G/DL (ref 6.4–8.3)
RBC # BLD AUTO: 4.45 M/UL (ref 4.7–6.1)
SODIUM SERPL-SCNC: 144 MMOL/L (ref 136–145)
SYNTHETIC CANNABINOIDS(K2) SCREEN, URINE: NORMAL
THC SCREEN, URINE: NORMAL
TRAMADOL SCREEN URINE: NORMAL
TRICYCLIC ANTIDEPRESSANTS, UR: NORMAL
TRIGL SERPL-MCNC: 87 MG/DL (ref 0–149)
TSH SERPL DL<=0.005 MIU/L-ACNC: 0.38 UIU/ML (ref 0.27–4.2)
WBC # BLD AUTO: 5.9 K/UL (ref 4.8–10.8)

## 2025-01-13 PROCEDURE — 80305 DRUG TEST PRSMV DIR OPT OBS: CPT | Performed by: INTERNAL MEDICINE

## 2025-01-13 PROCEDURE — 99214 OFFICE O/P EST MOD 30 MIN: CPT | Performed by: INTERNAL MEDICINE

## 2025-01-13 RX ORDER — OXYCODONE AND ACETAMINOPHEN 10; 325 MG/1; MG/1
1 TABLET ORAL EVERY 6 HOURS PRN
Qty: 120 TABLET | Refills: 0 | Status: SHIPPED | OUTPATIENT
Start: 2025-01-15 | End: 2025-02-14

## 2025-01-13 SDOH — ECONOMIC STABILITY: FOOD INSECURITY: WITHIN THE PAST 12 MONTHS, THE FOOD YOU BOUGHT JUST DIDN'T LAST AND YOU DIDN'T HAVE MONEY TO GET MORE.: NEVER TRUE

## 2025-01-13 SDOH — ECONOMIC STABILITY: FOOD INSECURITY: WITHIN THE PAST 12 MONTHS, YOU WORRIED THAT YOUR FOOD WOULD RUN OUT BEFORE YOU GOT MONEY TO BUY MORE.: NEVER TRUE

## 2025-01-13 ASSESSMENT — PATIENT HEALTH QUESTIONNAIRE - PHQ9
1. LITTLE INTEREST OR PLEASURE IN DOING THINGS: NOT AT ALL
SUM OF ALL RESPONSES TO PHQ QUESTIONS 1-9: 0
SUM OF ALL RESPONSES TO PHQ9 QUESTIONS 1 & 2: 0
SUM OF ALL RESPONSES TO PHQ QUESTIONS 1-9: 0
2. FEELING DOWN, DEPRESSED OR HOPELESS: NOT AT ALL

## 2025-01-13 NOTE — PROGRESS NOTES
Return in about 3 months (around 4/13/2025).     Orders Placed This Encounter   Procedures    POCT Rapid Drug Screen       Yumi Ruelas MD

## 2025-01-14 ASSESSMENT — ENCOUNTER SYMPTOMS
SINUS PAIN: 0
DIARRHEA: 0
EYE REDNESS: 0
VOMITING: 0
COLOR CHANGE: 0
TROUBLE SWALLOWING: 0
SINUS PRESSURE: 0
NAUSEA: 1
COUGH: 0
VOICE CHANGE: 0
SORE THROAT: 0
SHORTNESS OF BREATH: 0
EYE PAIN: 0
ABDOMINAL PAIN: 0
CONSTIPATION: 0
PHOTOPHOBIA: 0
BLOOD IN STOOL: 0
CHEST TIGHTNESS: 0
BACK PAIN: 1
RHINORRHEA: 0
EYE DISCHARGE: 0
ABDOMINAL DISTENTION: 0
EYE ITCHING: 0
WHEEZING: 0

## 2025-01-24 DIAGNOSIS — F41.9 ANXIETY: ICD-10-CM

## 2025-01-24 RX ORDER — DIAZEPAM 10 MG/1
TABLET ORAL
Qty: 60 TABLET | Refills: 0 | Status: SHIPPED | OUTPATIENT
Start: 2025-01-25 | End: 2025-02-23

## 2025-01-24 NOTE — TELEPHONE ENCOUNTER
Eleazar ROSALES [place] called to request a refill on his medication.      Last office visit : 1/13/2025   Next office visit : 4/14/2025     Last UDS:   Benzodiazepine Screen, Urine   Date Value Ref Range Status   01/13/2025 pos  Final     Buprenorphine Urine   Date Value Ref Range Status   01/13/2025 neg  Final     Cocaine Metabolite Screen, Urine   Date Value Ref Range Status   01/13/2025 neg  Final     Gabapentin Screen, Urine   Date Value Ref Range Status   01/13/2025 na  Final     Oxycodone Screen, Ur   Date Value Ref Range Status   01/13/2025 pos  Final     Propoxyphene Screen, Urine   Date Value Ref Range Status   01/13/2025 neg  Final     THC Screen, Urine   Date Value Ref Range Status   01/13/2025 neg  Final     Tricyclic Antidepressants, Urine   Date Value Ref Range Status   01/13/2025 neg  Final       Last Bryan: 1/9/2025  Medication Contract: 06/28/2024     Requested Prescriptions     Pending Prescriptions Disp Refills    diazePAM (VALIUM) 10 MG tablet [Pharmacy Med Name: DIAZEPAM 10MG TABLET] 60 tablet 0     Sig: TAKE ONE (1) TABLET BY MOUTH TWICE A DAY AS NEEDED FOR ANXIETY         Please approve or refuse this medication.   Salina Pimentel LPN

## 2025-01-29 ENCOUNTER — TELEPHONE (OUTPATIENT)
Dept: INTERNAL MEDICINE | Age: 55
End: 2025-01-29

## 2025-01-29 ENCOUNTER — OFFICE VISIT (OUTPATIENT)
Dept: INTERNAL MEDICINE | Age: 55
End: 2025-01-29

## 2025-01-29 VITALS
WEIGHT: 177 LBS | HEART RATE: 71 BPM | OXYGEN SATURATION: 99 % | DIASTOLIC BLOOD PRESSURE: 82 MMHG | SYSTOLIC BLOOD PRESSURE: 136 MMHG | HEIGHT: 72 IN | TEMPERATURE: 97.8 F | BODY MASS INDEX: 23.98 KG/M2

## 2025-01-29 DIAGNOSIS — M54.50 ACUTE EXACERBATION OF CHRONIC LOW BACK PAIN: Primary | ICD-10-CM

## 2025-01-29 DIAGNOSIS — G89.29 ACUTE EXACERBATION OF CHRONIC LOW BACK PAIN: Primary | ICD-10-CM

## 2025-01-29 PROCEDURE — 96372 THER/PROPH/DIAG INJ SC/IM: CPT | Performed by: NURSE PRACTITIONER

## 2025-01-29 PROCEDURE — 99214 OFFICE O/P EST MOD 30 MIN: CPT | Performed by: NURSE PRACTITIONER

## 2025-01-29 RX ORDER — KETOROLAC TROMETHAMINE 30 MG/ML
60 INJECTION, SOLUTION INTRAMUSCULAR; INTRAVENOUS ONCE
Status: COMPLETED | OUTPATIENT
Start: 2025-01-29 | End: 2025-01-29

## 2025-01-29 RX ORDER — METHOCARBAMOL 750 MG/1
750 TABLET, FILM COATED ORAL 2 TIMES DAILY
Qty: 20 TABLET | Refills: 0 | Status: SHIPPED | OUTPATIENT
Start: 2025-01-29 | End: 2025-02-08

## 2025-01-29 RX ORDER — DEXAMETHASONE SODIUM PHOSPHATE 10 MG/ML
10 INJECTION, SOLUTION INTRAMUSCULAR; INTRAVENOUS ONCE
Status: COMPLETED | OUTPATIENT
Start: 2025-01-29 | End: 2025-01-29

## 2025-01-29 RX ADMIN — KETOROLAC TROMETHAMINE 60 MG: 30 INJECTION, SOLUTION INTRAMUSCULAR; INTRAVENOUS at 15:16

## 2025-01-29 RX ADMIN — DEXAMETHASONE SODIUM PHOSPHATE 10 MG: 10 INJECTION, SOLUTION INTRAMUSCULAR; INTRAVENOUS at 15:15

## 2025-01-29 ASSESSMENT — ENCOUNTER SYMPTOMS
BACK PAIN: 1
RESPIRATORY NEGATIVE: 1
GASTROINTESTINAL NEGATIVE: 1
ALLERGIC/IMMUNOLOGIC NEGATIVE: 1
EYES NEGATIVE: 1

## 2025-01-29 NOTE — PROGRESS NOTES
transcription/translation of spoken language to printed texts.  The electronic translation of spoken language may be erroneous, or at times, nonsensical words or phrases may be inadvertentlytranscribed.  Although I have reviewed the note for such errors, some may still exist.

## 2025-01-29 NOTE — TELEPHONE ENCOUNTER
Patient called requesting a cortisone shot for back pain. Previously had and states it helped him. Scheduled with CHERELLE.

## 2025-01-30 ENCOUNTER — TELEPHONE (OUTPATIENT)
Dept: INTERNAL MEDICINE | Age: 55
End: 2025-01-30

## 2025-01-30 DIAGNOSIS — M54.2 NECK PAIN: ICD-10-CM

## 2025-01-30 DIAGNOSIS — M54.9 BACK PAIN, UNSPECIFIED BACK LOCATION, UNSPECIFIED BACK PAIN LATERALITY, UNSPECIFIED CHRONICITY: Primary | ICD-10-CM

## 2025-01-30 NOTE — TELEPHONE ENCOUNTER
Patient has requested MRI orders for lumbar spine and cervical spine. Wanting imaging performed at [facility]. Please advise?

## 2025-01-31 DIAGNOSIS — M54.2 NECK PAIN: Primary | ICD-10-CM

## 2025-02-06 DIAGNOSIS — F41.9 ANXIETY DISORDER, UNSPECIFIED TYPE: ICD-10-CM

## 2025-02-06 RX ORDER — LORAZEPAM 0.5 MG/1
TABLET ORAL
Qty: 90 TABLET | Refills: 1 | Status: SHIPPED | OUTPATIENT
Start: 2025-02-06 | End: 2025-03-07

## 2025-02-06 RX ORDER — PROPRANOLOL HCL 20 MG
TABLET ORAL
Qty: 180 TABLET | Refills: 5 | Status: SHIPPED | OUTPATIENT
Start: 2025-02-06

## 2025-02-06 NOTE — TELEPHONE ENCOUNTER
Eleazar Renteria called to request a refill on his medication.      Last office visit : 1/29/2025   Next office visit : 4/14/2025     Last UDS:   Benzodiazepine Screen, Urine   Date Value Ref Range Status   01/13/2025 pos  Final     Buprenorphine Urine   Date Value Ref Range Status   01/13/2025 neg  Final     Cocaine Metabolite Screen, Urine   Date Value Ref Range Status   01/13/2025 neg  Final     Gabapentin Screen, Urine   Date Value Ref Range Status   01/13/2025 na  Final     Oxycodone Screen, Ur   Date Value Ref Range Status   01/13/2025 pos  Final     Propoxyphene Screen, Urine   Date Value Ref Range Status   01/13/2025 neg  Final     THC Screen, Urine   Date Value Ref Range Status   01/13/2025 neg  Final     Tricyclic Antidepressants, Urine   Date Value Ref Range Status   01/13/2025 neg  Final       Last Bryan: 1/24/2025  Medication Contract: 06/28/2024     Requested Prescriptions     Pending Prescriptions Disp Refills    LORazepam (ATIVAN) 0.5 MG tablet [Pharmacy Med Name: LORAZEPAM 0.5MG TABLET] 90 tablet 5     Sig: TAKE ONE (1) TABLET BY MOUTH EVERY 8 HOURS AS NEEDED FOR ANXIETY    propranolol (INDERAL) 20 MG tablet [Pharmacy Med Name: PROPRANOLOL HYDROCHLORIDE 20MG TABLET] 180 tablet 5     Sig: TAKE THREE (3) TABLETS BY MOUTH TWICE A DAY         Please approve or refuse this medication.   Salina Pimentel LPN

## 2025-02-07 DIAGNOSIS — K21.9 GASTROESOPHAGEAL REFLUX DISEASE WITHOUT ESOPHAGITIS: ICD-10-CM

## 2025-02-07 RX ORDER — PROMETHAZINE HYDROCHLORIDE 25 MG/1
TABLET ORAL
Qty: 90 TABLET | Refills: 1 | Status: SHIPPED | OUTPATIENT
Start: 2025-02-07

## 2025-02-20 ENCOUNTER — TELEPHONE (OUTPATIENT)
Dept: INTERNAL MEDICINE | Age: 55
End: 2025-02-20

## 2025-02-20 DIAGNOSIS — M54.9 BACK PAIN, UNSPECIFIED BACK LOCATION, UNSPECIFIED BACK PAIN LATERALITY, UNSPECIFIED CHRONICITY: ICD-10-CM

## 2025-02-20 DIAGNOSIS — M54.2 NECK PAIN: ICD-10-CM

## 2025-02-20 DIAGNOSIS — F41.9 ANXIETY: ICD-10-CM

## 2025-02-20 RX ORDER — DIAZEPAM 10 MG/1
TABLET ORAL
Qty: 60 TABLET | Refills: 0 | Status: SHIPPED | OUTPATIENT
Start: 2025-02-23 | End: 2025-03-25

## 2025-02-20 NOTE — TELEPHONE ENCOUNTER
Eleazar ROSALES [place] called to request a refill on his medication.      Last office visit : 1/29/2025   Next office visit : 4/14/2025     Last UDS:   Benzodiazepine Screen, Urine   Date Value Ref Range Status   01/13/2025 pos  Final     Buprenorphine Urine   Date Value Ref Range Status   01/13/2025 neg  Final     Cocaine Metabolite Screen, Urine   Date Value Ref Range Status   01/13/2025 neg  Final     Gabapentin Screen, Urine   Date Value Ref Range Status   01/13/2025 na  Final     Oxycodone Screen, Ur   Date Value Ref Range Status   01/13/2025 pos  Final     Propoxyphene Screen, Urine   Date Value Ref Range Status   01/13/2025 neg  Final     THC Screen, Urine   Date Value Ref Range Status   01/13/2025 neg  Final     Tricyclic Antidepressants, Urine   Date Value Ref Range Status   01/13/2025 neg  Final       Last Bryan: 2/6/2025  Medication Contract: 06/28/2024     Requested Prescriptions     Pending Prescriptions Disp Refills    diazePAM (VALIUM) 10 MG tablet [Pharmacy Med Name: DIAZEPAM 10MG TABLET] 60 tablet 0     Sig: TAKE ONE (1) TABLET BY MOUTH TWICE A DAY AS NEEDED FOR ANXIETY         Please approve or refuse this medication.   Salina Pimentel LPN

## 2025-02-21 NOTE — TELEPHONE ENCOUNTER
PT notified. Voiced understanding.  Pt and wife are going to discuss what he wants to do before making a decision and call and let the office know. Pts wife stated that he does have some questions about the MRI results that he wants to discuss with provider.

## 2025-03-14 DIAGNOSIS — M54.50 CHRONIC LOW BACK PAIN, UNSPECIFIED BACK PAIN LATERALITY, UNSPECIFIED WHETHER SCIATICA PRESENT: ICD-10-CM

## 2025-03-14 DIAGNOSIS — M54.2 NECK PAIN: Primary | ICD-10-CM

## 2025-03-14 DIAGNOSIS — F41.9 ANXIETY: ICD-10-CM

## 2025-03-14 DIAGNOSIS — G89.29 CHRONIC LOW BACK PAIN, UNSPECIFIED BACK PAIN LATERALITY, UNSPECIFIED WHETHER SCIATICA PRESENT: ICD-10-CM

## 2025-03-14 NOTE — TELEPHONE ENCOUNTER
Eleazar Renteria called to request a refill on his medication.      Last office visit : 1/29/2025   Next office visit : 4/14/2025     Last UDS:   Benzodiazepine Screen, Urine   Date Value Ref Range Status   01/13/2025 pos  Final     Buprenorphine Urine   Date Value Ref Range Status   01/13/2025 neg  Final     Cocaine Metabolite Screen, Urine   Date Value Ref Range Status   01/13/2025 neg  Final     Gabapentin Screen, Urine   Date Value Ref Range Status   01/13/2025 na  Final     Oxycodone Screen, Ur   Date Value Ref Range Status   01/13/2025 pos  Final     Propoxyphene Screen, Urine   Date Value Ref Range Status   01/13/2025 neg  Final     THC Screen, Urine   Date Value Ref Range Status   01/13/2025 neg  Final     Tricyclic Antidepressants, Urine   Date Value Ref Range Status   01/13/2025 neg  Final       Last Bryan: 2/20/2025  Medication Contract: 06/28/2024     Requested Prescriptions     Pending Prescriptions Disp Refills    diazePAM (VALIUM) 10 MG tablet 60 tablet 0     Sig: TAKE ONE (1) TABLET BY MOUTH TWICE A DAY AS NEEDED FOR ANXIETY    oxyCODONE-acetaminophen (PERCOCET)  MG per tablet 120 tablet 0     Sig: Take 1 tablet by mouth every 6 hours as needed for Pain for up to 30 days. Max Daily Amount: 4 tablets         Please approve or refuse this medication.   Salina Pimentel LPN

## 2025-03-15 RX ORDER — OXYCODONE AND ACETAMINOPHEN 10; 325 MG/1; MG/1
1 TABLET ORAL EVERY 6 HOURS PRN
Qty: 120 TABLET | Refills: 0 | Status: SHIPPED | OUTPATIENT
Start: 2025-03-19 | End: 2025-04-18

## 2025-03-15 RX ORDER — DIAZEPAM 10 MG/1
TABLET ORAL
Qty: 60 TABLET | Refills: 0 | Status: SHIPPED | OUTPATIENT
Start: 2025-03-22 | End: 2025-04-13

## 2025-03-26 DIAGNOSIS — F41.9 ANXIETY DISORDER, UNSPECIFIED TYPE: ICD-10-CM

## 2025-03-26 RX ORDER — LORAZEPAM 0.5 MG/1
TABLET ORAL
Qty: 90 TABLET | Refills: 0 | Status: SHIPPED | OUTPATIENT
Start: 2025-04-05 | End: 2025-04-26

## 2025-03-26 NOTE — TELEPHONE ENCOUNTER
Eleazar ROSALES [place] called to request a refill on his medication.      Last office visit : 1/29/2025   Next office visit : 4/14/2025     Last UDS:   Benzodiazepine Screen, Urine   Date Value Ref Range Status   01/13/2025 pos  Final     Buprenorphine Urine   Date Value Ref Range Status   01/13/2025 neg  Final     Cocaine Metabolite Screen, Urine   Date Value Ref Range Status   01/13/2025 neg  Final     Gabapentin Screen, Urine   Date Value Ref Range Status   01/13/2025 na  Final     Oxycodone Screen, Ur   Date Value Ref Range Status   01/13/2025 pos  Final     Propoxyphene Screen, Urine   Date Value Ref Range Status   01/13/2025 neg  Final     THC Screen, Urine   Date Value Ref Range Status   01/13/2025 neg  Final     Tricyclic Antidepressants, Urine   Date Value Ref Range Status   01/13/2025 neg  Final       Last Bryan: 3/15/2025  Medication Contract: 06/28/2024     Requested Prescriptions     Pending Prescriptions Disp Refills    LORazepam (ATIVAN) 0.5 MG tablet [Pharmacy Med Name: LORAZEPAM 0.5MG TABLET] 90 tablet 1     Sig: TAKE ONE (1) TABLET BY MOUTH EVERY 8 HOURS AS NEEDED FOR ANXIETY         Please approve or refuse this medication.   Salina Pimentel LPN

## 2025-04-14 ENCOUNTER — OFFICE VISIT (OUTPATIENT)
Dept: INTERNAL MEDICINE | Age: 55
End: 2025-04-14

## 2025-04-14 VITALS
DIASTOLIC BLOOD PRESSURE: 69 MMHG | HEART RATE: 71 BPM | WEIGHT: 170.4 LBS | SYSTOLIC BLOOD PRESSURE: 110 MMHG | BODY MASS INDEX: 23.08 KG/M2 | HEIGHT: 72 IN | OXYGEN SATURATION: 99 %

## 2025-04-14 DIAGNOSIS — M54.50 CHRONIC LOW BACK PAIN, UNSPECIFIED BACK PAIN LATERALITY, UNSPECIFIED WHETHER SCIATICA PRESENT: Primary | ICD-10-CM

## 2025-04-14 DIAGNOSIS — D50.9 IRON DEFICIENCY ANEMIA, UNSPECIFIED IRON DEFICIENCY ANEMIA TYPE: ICD-10-CM

## 2025-04-14 DIAGNOSIS — F41.9 ANXIETY: ICD-10-CM

## 2025-04-14 DIAGNOSIS — Z91.09 ENVIRONMENTAL ALLERGIES: ICD-10-CM

## 2025-04-14 DIAGNOSIS — R53.83 OTHER FATIGUE: ICD-10-CM

## 2025-04-14 DIAGNOSIS — R11.0 NAUSEA: ICD-10-CM

## 2025-04-14 DIAGNOSIS — G89.29 CHRONIC LOW BACK PAIN, UNSPECIFIED BACK PAIN LATERALITY, UNSPECIFIED WHETHER SCIATICA PRESENT: Primary | ICD-10-CM

## 2025-04-14 DIAGNOSIS — R73.9 HYPERGLYCEMIA: ICD-10-CM

## 2025-04-14 DIAGNOSIS — I10 PRIMARY HYPERTENSION: ICD-10-CM

## 2025-04-14 DIAGNOSIS — Z79.899 MEDICATION MANAGEMENT: ICD-10-CM

## 2025-04-14 DIAGNOSIS — G89.29 CHRONIC NECK PAIN: ICD-10-CM

## 2025-04-14 DIAGNOSIS — G25.0 BENIGN ESSENTIAL TREMOR: ICD-10-CM

## 2025-04-14 DIAGNOSIS — G25.0 ESSENTIAL TREMOR: ICD-10-CM

## 2025-04-14 DIAGNOSIS — K21.9 GASTROESOPHAGEAL REFLUX DISEASE WITHOUT ESOPHAGITIS: ICD-10-CM

## 2025-04-14 DIAGNOSIS — E78.5 HYPERLIPIDEMIA, UNSPECIFIED HYPERLIPIDEMIA TYPE: ICD-10-CM

## 2025-04-14 DIAGNOSIS — F41.9 ANXIETY DISORDER, UNSPECIFIED TYPE: ICD-10-CM

## 2025-04-14 DIAGNOSIS — D50.9 IRON DEFICIENCY ANEMIA, UNSPECIFIED IRON DEFICIENCY ANEMIA TYPE: Primary | ICD-10-CM

## 2025-04-14 DIAGNOSIS — M54.2 CHRONIC NECK PAIN: ICD-10-CM

## 2025-04-14 DIAGNOSIS — Z12.5 SCREENING FOR MALIGNANT NEOPLASM OF PROSTATE: ICD-10-CM

## 2025-04-14 LAB
ALBUMIN SERPL-MCNC: 4.5 G/DL (ref 3.5–5.2)
ALCOHOL URINE: NORMAL
ALP SERPL-CCNC: 74 U/L (ref 40–129)
ALT SERPL-CCNC: 38 U/L (ref 10–50)
AMPHETAMINE SCREEN URINE: NORMAL
ANION GAP SERPL CALCULATED.3IONS-SCNC: 10 MMOL/L (ref 8–16)
AST SERPL-CCNC: 31 U/L (ref 10–50)
BARBITURATE SCREEN URINE: NORMAL
BASOPHILS # BLD: 0.1 K/UL (ref 0–0.2)
BASOPHILS NFR BLD: 1 % (ref 0–1)
BENZODIAZEPINE SCREEN, URINE: NORMAL
BILIRUB SERPL-MCNC: 0.4 MG/DL (ref 0.2–1.2)
BUN SERPL-MCNC: 15 MG/DL (ref 6–20)
BUPRENORPHINE URINE: NORMAL
CALCIUM SERPL-MCNC: 10 MG/DL (ref 8.6–10)
CHLORIDE SERPL-SCNC: 103 MMOL/L (ref 98–107)
CHOLEST SERPL-MCNC: 166 MG/DL (ref 0–199)
CO2 SERPL-SCNC: 28 MMOL/L (ref 22–29)
COCAINE METABOLITE SCREEN URINE: NORMAL
CREAT SERPL-MCNC: 1 MG/DL (ref 0.7–1.2)
EOSINOPHIL # BLD: 0.1 K/UL (ref 0–0.6)
EOSINOPHIL NFR BLD: 1.3 % (ref 0–5)
ERYTHROCYTE [DISTWIDTH] IN BLOOD BY AUTOMATED COUNT: 13.2 % (ref 11.5–14.5)
FENTANYL SCREEN, URINE: NORMAL
GABAPENTIN SCREEN, URINE: NORMAL
GLUCOSE SERPL-MCNC: 130 MG/DL (ref 70–99)
HCT VFR BLD AUTO: 43.7 % (ref 42–52)
HDLC SERPL-MCNC: 56 MG/DL (ref 40–60)
HGB BLD-MCNC: 14 G/DL (ref 14–18)
IMM GRANULOCYTES # BLD: 0 K/UL
LDLC SERPL CALC-MCNC: 97 MG/DL
LYMPHOCYTES # BLD: 1.6 K/UL (ref 1.1–4.5)
LYMPHOCYTES NFR BLD: 20.3 % (ref 20–40)
MCH RBC QN AUTO: 29.2 PG (ref 27–31)
MCHC RBC AUTO-ENTMCNC: 32 G/DL (ref 33–37)
MCV RBC AUTO: 91 FL (ref 80–94)
MDMA, URINE: NORMAL
METHADONE SCREEN, URINE: NORMAL
METHAMPHETAMINE, URINE: NORMAL
MONOCYTES # BLD: 0.6 K/UL (ref 0–0.9)
MONOCYTES NFR BLD: 7.9 % (ref 0–10)
NEUTROPHILS # BLD: 5.5 K/UL (ref 1.5–7.5)
NEUTS SEG NFR BLD: 69.1 % (ref 50–65)
OPIATE SCREEN URINE: NORMAL
OXYCODONE SCREEN URINE: NORMAL
PHENCYCLIDINE SCREEN URINE: NORMAL
PLATELET # BLD AUTO: 371 K/UL (ref 130–400)
PMV BLD AUTO: 9.8 FL (ref 9.4–12.4)
POTASSIUM SERPL-SCNC: 5.1 MMOL/L (ref 3.5–5.1)
PROPOXYPHENE SCREEN, URINE: NORMAL
PROT SERPL-MCNC: 7.3 G/DL (ref 6.4–8.3)
RBC # BLD AUTO: 4.8 M/UL (ref 4.7–6.1)
SODIUM SERPL-SCNC: 141 MMOL/L (ref 136–145)
SYNTHETIC CANNABINOIDS(K2) SCREEN, URINE: NORMAL
THC SCREEN, URINE: NORMAL
TRAMADOL SCREEN URINE: NORMAL
TRICYCLIC ANTIDEPRESSANTS, UR: NORMAL
TRIGL SERPL-MCNC: 67 MG/DL (ref 0–149)
WBC # BLD AUTO: 8 K/UL (ref 4.8–10.8)

## 2025-04-14 PROCEDURE — 3074F SYST BP LT 130 MM HG: CPT | Performed by: INTERNAL MEDICINE

## 2025-04-14 PROCEDURE — 99214 OFFICE O/P EST MOD 30 MIN: CPT | Performed by: INTERNAL MEDICINE

## 2025-04-14 PROCEDURE — 80305 DRUG TEST PRSMV DIR OPT OBS: CPT | Performed by: INTERNAL MEDICINE

## 2025-04-14 PROCEDURE — 3078F DIAST BP <80 MM HG: CPT | Performed by: INTERNAL MEDICINE

## 2025-04-14 RX ORDER — OXYCODONE AND ACETAMINOPHEN 10; 325 MG/1; MG/1
1 TABLET ORAL EVERY 6 HOURS PRN
Qty: 120 TABLET | Refills: 0 | Status: SHIPPED | OUTPATIENT
Start: 2025-04-18 | End: 2025-05-18

## 2025-04-14 RX ORDER — METHYLPREDNISOLONE 4 MG/1
TABLET ORAL
Qty: 1 KIT | Refills: 0 | Status: SHIPPED | OUTPATIENT
Start: 2025-04-14 | End: 2025-04-20
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
## 2025-04-15 RX ORDER — DIAZEPAM 10 MG/1
TABLET ORAL
Qty: 60 TABLET | Refills: 1 | Status: SHIPPED | OUTPATIENT
Start: 2025-04-19 | End: 2025-05-19

## 2025-04-15 NOTE — TELEPHONE ENCOUNTER
Eleazar ROSALES [place] called to request a refill on his medication.      Last office visit : 4/14/2025   Next office visit : 7/14/2025     Last UDS:   Benzodiazepine Screen, Urine   Date Value Ref Range Status   04/14/2025 pos  Final     Buprenorphine Urine   Date Value Ref Range Status   04/14/2025 neg  Final     Cocaine Metabolite Screen, Urine   Date Value Ref Range Status   04/14/2025 neg  Final     Gabapentin Screen, Urine   Date Value Ref Range Status   04/14/2025 na  Final     Oxycodone Screen, Ur   Date Value Ref Range Status   04/14/2025 pos  Final     Propoxyphene Screen, Urine   Date Value Ref Range Status   04/14/2025 neg  Final     THC Screen, Urine   Date Value Ref Range Status   04/14/2025 neg  Final     Tricyclic Antidepressants, Urine   Date Value Ref Range Status   04/14/2025 neg  Final       Last Bryan: 3/26/2025  Medication Contract: 6/28/2024     Requested Prescriptions     Pending Prescriptions Disp Refills    diazePAM (VALIUM) 10 MG tablet [Pharmacy Med Name: DIAZEPAM 10MG TABLET] 60 tablet 0     Sig: TAKE ONE (1) TABLET BY MOUTH TWICE A DAY AS NEEDED FOR ANXIETY         Please approve or refuse this medication.   Salina Pimentel LPN

## 2025-04-16 RX ORDER — PROPRANOLOL HYDROCHLORIDE 60 MG/1
60 TABLET ORAL 2 TIMES DAILY
Qty: 60 TABLET | Refills: 4
Start: 2025-04-16

## 2025-04-16 SDOH — ECONOMIC STABILITY: FOOD INSECURITY: WITHIN THE PAST 12 MONTHS, THE FOOD YOU BOUGHT JUST DIDN'T LAST AND YOU DIDN'T HAVE MONEY TO GET MORE.: NEVER TRUE

## 2025-04-16 SDOH — ECONOMIC STABILITY: FOOD INSECURITY: WITHIN THE PAST 12 MONTHS, YOU WORRIED THAT YOUR FOOD WOULD RUN OUT BEFORE YOU GOT MONEY TO BUY MORE.: NEVER TRUE

## 2025-04-16 ASSESSMENT — ENCOUNTER SYMPTOMS
RHINORRHEA: 0
BACK PAIN: 1
EYE REDNESS: 0
BLOOD IN STOOL: 0
ABDOMINAL DISTENTION: 0
DIARRHEA: 0
VOMITING: 0
COLOR CHANGE: 0
EYE ITCHING: 0
EYE PAIN: 0
EYE DISCHARGE: 0
SORE THROAT: 0
NAUSEA: 1
COUGH: 0
PHOTOPHOBIA: 0
SHORTNESS OF BREATH: 0
ABDOMINAL PAIN: 0
SINUS PRESSURE: 0
VOICE CHANGE: 0
TROUBLE SWALLOWING: 0
SINUS PAIN: 0
CHEST TIGHTNESS: 0
CONSTIPATION: 0
WHEEZING: 0

## 2025-04-16 ASSESSMENT — PATIENT HEALTH QUESTIONNAIRE - PHQ9
SUM OF ALL RESPONSES TO PHQ QUESTIONS 1-9: 0
2. FEELING DOWN, DEPRESSED OR HOPELESS: NOT AT ALL
1. LITTLE INTEREST OR PLEASURE IN DOING THINGS: NOT AT ALL
SUM OF ALL RESPONSES TO PHQ QUESTIONS 1-9: 0

## 2025-04-29 DIAGNOSIS — K21.9 GASTROESOPHAGEAL REFLUX DISEASE WITHOUT ESOPHAGITIS: ICD-10-CM

## 2025-04-29 DIAGNOSIS — F41.9 ANXIETY DISORDER, UNSPECIFIED TYPE: ICD-10-CM

## 2025-04-29 RX ORDER — PROMETHAZINE HYDROCHLORIDE 25 MG/1
TABLET ORAL
Qty: 90 TABLET | Refills: 1 | Status: SHIPPED | OUTPATIENT
Start: 2025-04-29

## 2025-04-29 RX ORDER — LORAZEPAM 0.5 MG/1
TABLET ORAL
Qty: 90 TABLET | Refills: 0 | Status: SHIPPED | OUTPATIENT
Start: 2025-05-03 | End: 2025-06-02

## 2025-04-29 NOTE — TELEPHONE ENCOUNTER
Eleazar ROSALES [place] called to request a refill on his medication.      Last office visit : 4/14/2025   Next office visit : 7/14/2025     Last UDS:   Benzodiazepine Screen, Urine   Date Value Ref Range Status   04/14/2025 pos  Final     Buprenorphine Urine   Date Value Ref Range Status   04/14/2025 neg  Final     Cocaine Metabolite Screen, Urine   Date Value Ref Range Status   04/14/2025 neg  Final     Gabapentin Screen, Urine   Date Value Ref Range Status   04/14/2025 na  Final     Oxycodone Screen, Ur   Date Value Ref Range Status   04/14/2025 pos  Final     Propoxyphene Screen, Urine   Date Value Ref Range Status   04/14/2025 neg  Final     THC Screen, Urine   Date Value Ref Range Status   04/14/2025 neg  Final     Tricyclic Antidepressants, Urine   Date Value Ref Range Status   04/14/2025 neg  Final       Last Bryan: 4/15/2025  Medication Contract: 06/28/2024     Requested Prescriptions     Pending Prescriptions Disp Refills    LORazepam (ATIVAN) 0.5 MG tablet [Pharmacy Med Name: LORAZEPAM 0.5MG TABLET] 90 tablet 0     Sig: TAKE ONE (1) TABLET BY MOUTH EVERY 8 HOURS AS NEEDED FOR ANXIETY    promethazine (PHENERGAN) 25 MG tablet [Pharmacy Med Name: PROMETHAZINE HYDROCHLORIDE 25MG TABLET] 90 tablet 1     Sig: TAKE ONE (1) TABLET BY MOUTH EVERY SIX (6) HOURS AS NEEDED FOR NAUSEA         Please approve or refuse this medication.   Salina Pimentel LPN

## 2025-05-12 DIAGNOSIS — M54.50 CHRONIC LOW BACK PAIN, UNSPECIFIED BACK PAIN LATERALITY, UNSPECIFIED WHETHER SCIATICA PRESENT: ICD-10-CM

## 2025-05-12 DIAGNOSIS — G89.29 CHRONIC LOW BACK PAIN, UNSPECIFIED BACK PAIN LATERALITY, UNSPECIFIED WHETHER SCIATICA PRESENT: ICD-10-CM

## 2025-05-12 RX ORDER — OXYCODONE AND ACETAMINOPHEN 10; 325 MG/1; MG/1
1 TABLET ORAL EVERY 6 HOURS PRN
Qty: 120 TABLET | Refills: 0 | Status: SHIPPED | OUTPATIENT
Start: 2025-05-18 | End: 2025-06-17

## 2025-05-12 NOTE — PROGRESS NOTES
I anticipate that I will be out of the office on the day that his Percocet is due for refill.  Bryan was reviewed.  Medication was sent to the pharmacy to be filled on May 15, 2025.  Often, when the provider is out of this office, other providers are hesitant to fill a controlled prescription with someone that they are not familiar with.    Electronically signed by Yumi Ruelas MD on 5/12/2025 at 4:50 PM

## 2025-05-13 ENCOUNTER — OFFICE VISIT (OUTPATIENT)
Dept: NEUROSURGERY | Facility: CLINIC | Age: 55
End: 2025-05-13

## 2025-05-13 VITALS — HEIGHT: 72 IN | BODY MASS INDEX: 23.62 KG/M2 | WEIGHT: 174.4 LBS

## 2025-05-13 DIAGNOSIS — M79.604 PAIN IN BOTH LOWER EXTREMITIES: ICD-10-CM

## 2025-05-13 DIAGNOSIS — M79.605 PAIN IN BOTH LOWER EXTREMITIES: ICD-10-CM

## 2025-05-13 DIAGNOSIS — M54.50 LUMBAR BACK PAIN: Primary | ICD-10-CM

## 2025-05-13 PROCEDURE — 99204 OFFICE O/P NEW MOD 45 MIN: CPT | Performed by: NURSE PRACTITIONER

## 2025-05-13 RX ORDER — CELECOXIB 100 MG/1
100 CAPSULE ORAL 2 TIMES DAILY PRN
Qty: 60 CAPSULE | Refills: 0 | Status: SHIPPED | OUTPATIENT
Start: 2025-05-13

## 2025-05-13 RX ORDER — CYCLOBENZAPRINE HCL 10 MG
10 TABLET ORAL NIGHTLY
Qty: 30 TABLET | Refills: 0 | Status: SHIPPED | OUTPATIENT
Start: 2025-05-13

## 2025-05-13 RX ORDER — LORAZEPAM 0.5 MG/1
TABLET ORAL
COMMUNITY
Start: 2025-05-03 | End: 2025-06-03

## 2025-05-13 RX ORDER — OMEPRAZOLE 20 MG/1
1 CAPSULE, DELAYED RELEASE ORAL DAILY
COMMUNITY
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
## 2025-05-16 DIAGNOSIS — F41.9 ANXIETY: ICD-10-CM

## 2025-05-16 RX ORDER — DIAZEPAM 10 MG/1
10 TABLET ORAL NIGHTLY PRN
Qty: 30 TABLET | Refills: 0 | Status: SHIPPED | OUTPATIENT
Start: 2025-05-16 | End: 2025-06-15

## 2025-05-29 DIAGNOSIS — F41.9 ANXIETY DISORDER, UNSPECIFIED TYPE: ICD-10-CM

## 2025-05-29 DIAGNOSIS — F41.9 ANXIETY: ICD-10-CM

## 2025-05-29 RX ORDER — LORAZEPAM 0.5 MG/1
TABLET ORAL
Qty: 90 TABLET | Refills: 0 | Status: SHIPPED | OUTPATIENT
Start: 2025-06-01 | End: 2025-06-28

## 2025-05-29 RX ORDER — DIAZEPAM 10 MG/1
10 TABLET ORAL 2 TIMES DAILY PRN
Qty: 60 TABLET | Refills: 0 | OUTPATIENT
Start: 2025-05-29 | End: 2025-06-28

## 2025-05-29 RX ORDER — DIAZEPAM 10 MG/1
10 TABLET ORAL 2 TIMES DAILY PRN
Qty: 60 TABLET | Refills: 0 | Status: SHIPPED | OUTPATIENT
Start: 2025-05-29 | End: 2025-06-28

## 2025-05-29 NOTE — TELEPHONE ENCOUNTER
Eleazar Renteria called to request a refill on his medication.      Last office visit : 4/14/2025   Next office visit : 7/14/2025     Last UDS:   Benzodiazepine Screen, Urine   Date Value Ref Range Status   04/14/2025 pos  Final     Buprenorphine Urine   Date Value Ref Range Status   04/14/2025 neg  Final     Cocaine Metabolite Screen, Urine   Date Value Ref Range Status   04/14/2025 neg  Final     Gabapentin Screen, Urine   Date Value Ref Range Status   04/14/2025 na  Final     Oxycodone Screen, Ur   Date Value Ref Range Status   04/14/2025 pos  Final     Propoxyphene Screen, Urine   Date Value Ref Range Status   04/14/2025 neg  Final     THC Screen, Urine   Date Value Ref Range Status   04/14/2025 neg  Final     Tricyclic Antidepressants, Urine   Date Value Ref Range Status   04/14/2025 neg  Final       Last Bryan: 5/12/2025  Medication Contract: 06/28/2024     Requested Prescriptions     Pending Prescriptions Disp Refills    LORazepam (ATIVAN) 0.5 MG tablet 90 tablet 0     Sig: TAKE ONE (1) TABLET BY MOUTH EVERY 8 HOURS AS NEEDED FOR ANXIETY    diazePAM (VALIUM) 10 MG tablet 60 tablet 0     Sig: Take 1 tablet by mouth 2 times daily as needed for Anxiety for up to 30 days. TAKE ONE (1) TABLET BY MOUTH TWICE A DAY AS NEEDED FOR ANXIETY Max Daily Amount: 20 mg         Please approve or refuse this medication.   Salina Pimentel LPN

## 2025-06-16 DIAGNOSIS — G89.29 CHRONIC LOW BACK PAIN, UNSPECIFIED BACK PAIN LATERALITY, UNSPECIFIED WHETHER SCIATICA PRESENT: ICD-10-CM

## 2025-06-16 DIAGNOSIS — M54.50 CHRONIC LOW BACK PAIN, UNSPECIFIED BACK PAIN LATERALITY, UNSPECIFIED WHETHER SCIATICA PRESENT: ICD-10-CM

## 2025-06-16 RX ORDER — OXYCODONE AND ACETAMINOPHEN 10; 325 MG/1; MG/1
1 TABLET ORAL EVERY 6 HOURS PRN
Qty: 120 TABLET | Refills: 0 | Status: SHIPPED | OUTPATIENT
Start: 2025-06-18 | End: 2025-07-18

## 2025-06-16 NOTE — TELEPHONE ENCOUNTER
Eleazar BOBBY Renteria called to request a refill on his medication.      Last office visit : 4/14/2025   Next office visit : 7/14/2025     Last UDS:   Benzodiazepine Screen, Urine   Date Value Ref Range Status   04/14/2025 pos  Final     Buprenorphine Urine   Date Value Ref Range Status   04/14/2025 neg  Final     Cocaine Metabolite Screen, Urine   Date Value Ref Range Status   04/14/2025 neg  Final     Gabapentin Screen, Urine   Date Value Ref Range Status   04/14/2025 na  Final     Oxycodone Screen, Ur   Date Value Ref Range Status   04/14/2025 pos  Final     Propoxyphene Screen, Urine   Date Value Ref Range Status   04/14/2025 neg  Final     THC Screen, Urine   Date Value Ref Range Status   04/14/2025 neg  Final     Tricyclic Antidepressants, Urine   Date Value Ref Range Status   04/14/2025 neg  Final       Last Bryan: 5/29/2025  Medication Contract: 6/28/2024     Requested Prescriptions     Pending Prescriptions Disp Refills    oxyCODONE-acetaminophen (PERCOCET)  MG per tablet 120 tablet 0     Sig: Take 1 tablet by mouth every 6 hours as needed for Pain for up to 30 days. Max Daily Amount: 4 tablets         Please approve or refuse this medication.   Raffy Carroll MA

## 2025-06-18 DIAGNOSIS — M54.50 CHRONIC LOW BACK PAIN, UNSPECIFIED BACK PAIN LATERALITY, UNSPECIFIED WHETHER SCIATICA PRESENT: ICD-10-CM

## 2025-06-18 DIAGNOSIS — G89.29 CHRONIC LOW BACK PAIN, UNSPECIFIED BACK PAIN LATERALITY, UNSPECIFIED WHETHER SCIATICA PRESENT: ICD-10-CM

## 2025-06-18 RX ORDER — METHYLPREDNISOLONE 4 MG/1
TABLET ORAL
Qty: 21 TABLET | Refills: 2 | Status: SHIPPED | OUTPATIENT
Start: 2025-06-18

## 2025-06-18 NOTE — TELEPHONE ENCOUNTER
Eleazar Renteria called to request a refill on his medication.      Last office visit : 4/14/2025   Next office visit : 7/14/2025     Requested Prescriptions     Pending Prescriptions Disp Refills    methylPREDNISolone (MEDROL DOSEPACK) 4 MG tablet [Pharmacy Med Name: METHYLPREDNISOLONE DOSE PACK 4MG TAB THER PACK] 21 tablet 2     Sig: TAKE BY MOUTH AS DIRECTED            Salina Pimentel LPN

## 2025-06-30 DIAGNOSIS — F41.9 ANXIETY DISORDER, UNSPECIFIED TYPE: ICD-10-CM

## 2025-06-30 DIAGNOSIS — F41.9 ANXIETY: ICD-10-CM

## 2025-06-30 RX ORDER — LORAZEPAM 0.5 MG/1
TABLET ORAL
Qty: 90 TABLET | Refills: 0 | Status: SHIPPED | OUTPATIENT
Start: 2025-06-30 | End: 2025-07-30

## 2025-06-30 RX ORDER — DIAZEPAM 10 MG/1
TABLET ORAL
Qty: 60 TABLET | Refills: 0 | Status: SHIPPED | OUTPATIENT
Start: 2025-06-30 | End: 2025-07-30

## 2025-06-30 NOTE — TELEPHONE ENCOUNTER
Eleazar ROSALES [place] called to request a refill on his medication.      Last office visit : 4/14/2025   Next office visit : 7/14/2025     Last UDS:   Benzodiazepine Screen, Urine   Date Value Ref Range Status   04/14/2025 pos  Final     Buprenorphine Urine   Date Value Ref Range Status   04/14/2025 neg  Final     Cocaine Metabolite Screen, Urine   Date Value Ref Range Status   04/14/2025 neg  Final     Gabapentin Screen, Urine   Date Value Ref Range Status   04/14/2025 na  Final     Oxycodone Screen, Ur   Date Value Ref Range Status   04/14/2025 pos  Final     Propoxyphene Screen, Urine   Date Value Ref Range Status   04/14/2025 neg  Final     THC Screen, Urine   Date Value Ref Range Status   04/14/2025 neg  Final     Tricyclic Antidepressants, Urine   Date Value Ref Range Status   04/14/2025 neg  Final       Last Bryan: 6/16/2025  Medication Contract: 6/28/2025     Requested Prescriptions     Pending Prescriptions Disp Refills    diazePAM (VALIUM) 10 MG tablet [Pharmacy Med Name: DIAZEPAM 10MG TABLET] 60 tablet 0     Sig: TAKE ONE (1) TABLET BY MOUTH TWO (2) TIMES DAILY AS NEEDED FOR ANXIETY FOR UP TO 30 DAYS. MAX DAILY AMOUNT: 20 MG    LORazepam (ATIVAN) 0.5 MG tablet [Pharmacy Med Name: LORAZEPAM 0.5MG TABLET] 90 tablet 0     Sig: TAKE ONE (1) TABLET BY MOUTH EVERY 8 HOURS AS NEEDED FOR ANXIETY         Please approve or refuse this medication.   Raffy Carroll MA

## 2025-07-14 ENCOUNTER — OFFICE VISIT (OUTPATIENT)
Dept: INTERNAL MEDICINE | Age: 55
End: 2025-07-14

## 2025-07-14 VITALS
OXYGEN SATURATION: 98 % | HEIGHT: 72 IN | WEIGHT: 182.6 LBS | SYSTOLIC BLOOD PRESSURE: 114 MMHG | BODY MASS INDEX: 24.73 KG/M2 | HEART RATE: 62 BPM | DIASTOLIC BLOOD PRESSURE: 76 MMHG

## 2025-07-14 DIAGNOSIS — Z79.899 HIGH RISK MEDICATION USE: ICD-10-CM

## 2025-07-14 DIAGNOSIS — R73.9 HYPERGLYCEMIA: ICD-10-CM

## 2025-07-14 DIAGNOSIS — M54.50 CHRONIC LOW BACK PAIN, UNSPECIFIED BACK PAIN LATERALITY, UNSPECIFIED WHETHER SCIATICA PRESENT: ICD-10-CM

## 2025-07-14 DIAGNOSIS — E78.5 HYPERLIPIDEMIA, UNSPECIFIED HYPERLIPIDEMIA TYPE: ICD-10-CM

## 2025-07-14 DIAGNOSIS — G89.29 CHRONIC LOW BACK PAIN, UNSPECIFIED BACK PAIN LATERALITY, UNSPECIFIED WHETHER SCIATICA PRESENT: ICD-10-CM

## 2025-07-14 DIAGNOSIS — Z12.5 SCREENING FOR MALIGNANT NEOPLASM OF PROSTATE: ICD-10-CM

## 2025-07-14 DIAGNOSIS — K21.9 GASTROESOPHAGEAL REFLUX DISEASE WITHOUT ESOPHAGITIS: ICD-10-CM

## 2025-07-14 DIAGNOSIS — M54.2 CERVICALGIA: ICD-10-CM

## 2025-07-14 DIAGNOSIS — F41.9 ANXIETY DISORDER, UNSPECIFIED TYPE: ICD-10-CM

## 2025-07-14 DIAGNOSIS — G25.0 ESSENTIAL TREMOR: ICD-10-CM

## 2025-07-14 DIAGNOSIS — D50.9 IRON DEFICIENCY ANEMIA, UNSPECIFIED IRON DEFICIENCY ANEMIA TYPE: ICD-10-CM

## 2025-07-14 DIAGNOSIS — Z00.00 ROUTINE ADULT HEALTH MAINTENANCE: Primary | ICD-10-CM

## 2025-07-14 DIAGNOSIS — F41.9 ANXIETY: ICD-10-CM

## 2025-07-14 DIAGNOSIS — Z91.09 ENVIRONMENTAL ALLERGIES: ICD-10-CM

## 2025-07-14 DIAGNOSIS — R53.83 OTHER FATIGUE: ICD-10-CM

## 2025-07-14 LAB
ALBUMIN SERPL-MCNC: 4.3 G/DL (ref 3.5–5.2)
ALP SERPL-CCNC: 64 U/L (ref 40–129)
ALT SERPL-CCNC: 27 U/L (ref 10–50)
ANION GAP SERPL CALCULATED.3IONS-SCNC: 6 MMOL/L (ref 8–16)
AST SERPL-CCNC: 35 U/L (ref 10–50)
BASOPHILS # BLD: 0.1 K/UL (ref 0–0.2)
BASOPHILS NFR BLD: 1.4 % (ref 0–1)
BILIRUB SERPL-MCNC: 0.3 MG/DL (ref 0.2–1.2)
BUN SERPL-MCNC: 19 MG/DL (ref 6–20)
CALCIUM SERPL-MCNC: 9.3 MG/DL (ref 8.6–10)
CHLORIDE SERPL-SCNC: 103 MMOL/L (ref 98–107)
CHOLEST SERPL-MCNC: 138 MG/DL (ref 0–199)
CO2 SERPL-SCNC: 28 MMOL/L (ref 22–29)
CREAT SERPL-MCNC: 1.2 MG/DL (ref 0.7–1.2)
EOSINOPHIL # BLD: 0.2 K/UL (ref 0–0.6)
EOSINOPHIL NFR BLD: 4.2 % (ref 0–5)
ERYTHROCYTE [DISTWIDTH] IN BLOOD BY AUTOMATED COUNT: 13.1 % (ref 11.5–14.5)
GLUCOSE SERPL-MCNC: 116 MG/DL (ref 70–99)
HBA1C MFR BLD: 5.7 % (ref 4–5.6)
HCT VFR BLD AUTO: 39 % (ref 42–52)
HDLC SERPL-MCNC: 51 MG/DL (ref 40–60)
HGB BLD-MCNC: 12.6 G/DL (ref 14–18)
IMM GRANULOCYTES # BLD: 0 K/UL
LDLC SERPL CALC-MCNC: 73 MG/DL
LYMPHOCYTES # BLD: 1.8 K/UL (ref 1.1–4.5)
LYMPHOCYTES NFR BLD: 35.7 % (ref 20–40)
MCH RBC QN AUTO: 29.1 PG (ref 27–31)
MCHC RBC AUTO-ENTMCNC: 32.3 G/DL (ref 33–37)
MCV RBC AUTO: 90.1 FL (ref 80–94)
MONOCYTES # BLD: 0.5 K/UL (ref 0–0.9)
MONOCYTES NFR BLD: 9.5 % (ref 0–10)
NEUTROPHILS # BLD: 2.5 K/UL (ref 1.5–7.5)
NEUTS SEG NFR BLD: 49 % (ref 50–65)
PLATELET # BLD AUTO: 283 K/UL (ref 130–400)
PMV BLD AUTO: 9.4 FL (ref 9.4–12.4)
POTASSIUM SERPL-SCNC: 5 MMOL/L (ref 3.5–5.1)
PROT SERPL-MCNC: 6.5 G/DL (ref 6.4–8.3)
PSA SERPL-MCNC: 1.58 NG/ML (ref 0–4)
RBC # BLD AUTO: 4.33 M/UL (ref 4.7–6.1)
SODIUM SERPL-SCNC: 137 MMOL/L (ref 136–145)
TRIGL SERPL-MCNC: 68 MG/DL (ref 0–149)
TSH SERPL DL<=0.005 MIU/L-ACNC: 2.08 UIU/ML (ref 0.27–4.2)
WBC # BLD AUTO: 5 K/UL (ref 4.8–10.8)

## 2025-07-14 PROCEDURE — 99396 PREV VISIT EST AGE 40-64: CPT | Performed by: INTERNAL MEDICINE

## 2025-07-14 PROCEDURE — 80305 DRUG TEST PRSMV DIR OPT OBS: CPT | Performed by: INTERNAL MEDICINE

## 2025-07-14 RX ORDER — DIAZEPAM 10 MG/1
10 TABLET ORAL 2 TIMES DAILY PRN
Qty: 60 TABLET | Refills: 0 | Status: CANCELLED | OUTPATIENT
Start: 2025-07-14 | End: 2025-08-13

## 2025-07-14 RX ORDER — LORAZEPAM 0.5 MG/1
TABLET ORAL
Qty: 90 TABLET | Refills: 0 | Status: CANCELLED | OUTPATIENT
Start: 2025-07-14 | End: 2025-08-13

## 2025-07-14 RX ORDER — OXYCODONE AND ACETAMINOPHEN 10; 325 MG/1; MG/1
1 TABLET ORAL EVERY 6 HOURS PRN
Qty: 120 TABLET | Refills: 0 | Status: SHIPPED | OUTPATIENT
Start: 2025-07-18 | End: 2025-08-17

## 2025-07-14 NOTE — PROGRESS NOTES
Mental Status: He is alert and oriented to person, place, and time. Mental status is at baseline.      Cranial Nerves: No cranial nerve deficit.      Sensory: No sensory deficit.      Motor: No weakness or abnormal muscle tone.      Coordination: Coordination normal.      Gait: Gait normal.      Deep Tendon Reflexes: Reflexes are normal and symmetric. Reflexes normal.      Comments: Tremor to hands noted   Psychiatric:         Mood and Affect: Mood normal.         Behavior: Behavior normal.         Thought Content: Thought content normal.         Judgment: Judgment normal.         1. Routine adult health maintenance    2. Chronic low back pain, unspecified back pain laterality, unspecified whether sciatica present    3. Anxiety    4. Anxiety disorder, unspecified type    5. Iron deficiency anemia, unspecified iron deficiency anemia type    6. High risk medication use    7. Cervicalgia    8. Essential tremor    9. Gastroesophageal reflux disease without esophagitis    10. Environmental allergies        ASSESSMENT/PLAN:    55-year-old male here for follow-up    1.  Health maintenance: Routine screening is as per HPI.  Labs discussed with patient today    2.  Anemia: Resume Flintstones vitamin.  Repeat a CBC in a month.    3.  Anxiety: Continue diazepam and Ativan as prescribed.  Patient advised to try to minimize use these medications is much as possible    4.  Chronic neck and back pain: Percocet as needed    5.  Tremors: Continue Inderal as prescribed    6.  Acid reflux: Continue Prilosec as prescribed    7.  Environmental allergies: Stable on Claritin    8.  Chronic back and neck pain: Continue physical therapy    Eleazar \"Joe\" was seen today for 3 month follow-up.    Diagnoses and all orders for this visit:    Routine adult health maintenance    Chronic low back pain, unspecified back pain laterality, unspecified whether sciatica present  -     oxyCODONE-acetaminophen (PERCOCET)  MG per tablet; Take 1

## 2025-07-15 SDOH — ECONOMIC STABILITY: FOOD INSECURITY: WITHIN THE PAST 12 MONTHS, YOU WORRIED THAT YOUR FOOD WOULD RUN OUT BEFORE YOU GOT MONEY TO BUY MORE.: NEVER TRUE

## 2025-07-15 SDOH — ECONOMIC STABILITY: FOOD INSECURITY: WITHIN THE PAST 12 MONTHS, THE FOOD YOU BOUGHT JUST DIDN'T LAST AND YOU DIDN'T HAVE MONEY TO GET MORE.: NEVER TRUE

## 2025-07-15 ASSESSMENT — PATIENT HEALTH QUESTIONNAIRE - PHQ9
SUM OF ALL RESPONSES TO PHQ QUESTIONS 1-9: 0
SUM OF ALL RESPONSES TO PHQ QUESTIONS 1-9: 0
2. FEELING DOWN, DEPRESSED OR HOPELESS: NOT AT ALL
1. LITTLE INTEREST OR PLEASURE IN DOING THINGS: NOT AT ALL
SUM OF ALL RESPONSES TO PHQ QUESTIONS 1-9: 0
SUM OF ALL RESPONSES TO PHQ QUESTIONS 1-9: 0

## 2025-07-15 ASSESSMENT — ENCOUNTER SYMPTOMS
DIARRHEA: 0
ABDOMINAL PAIN: 0
ABDOMINAL DISTENTION: 0
EYE DISCHARGE: 0
EYE PAIN: 0
RHINORRHEA: 0
COLOR CHANGE: 0
SINUS PRESSURE: 0
VOICE CHANGE: 0
PHOTOPHOBIA: 0
NAUSEA: 1
SHORTNESS OF BREATH: 0
BACK PAIN: 1
CONSTIPATION: 0
SORE THROAT: 0
TROUBLE SWALLOWING: 0
EYE ITCHING: 0
WHEEZING: 0
EYE REDNESS: 0
CHEST TIGHTNESS: 0
VOMITING: 0
SINUS PAIN: 0
COUGH: 0
BLOOD IN STOOL: 0

## 2025-07-29 DIAGNOSIS — F41.9 ANXIETY DISORDER, UNSPECIFIED TYPE: ICD-10-CM

## 2025-07-29 DIAGNOSIS — F41.9 ANXIETY: ICD-10-CM

## 2025-07-29 RX ORDER — LORAZEPAM 0.5 MG/1
TABLET ORAL
Qty: 90 TABLET | Refills: 0 | Status: SHIPPED | OUTPATIENT
Start: 2025-07-30 | End: 2025-08-29

## 2025-07-29 RX ORDER — DIAZEPAM 10 MG/1
TABLET ORAL
Qty: 60 TABLET | Refills: 0 | Status: SHIPPED | OUTPATIENT
Start: 2025-07-30 | End: 2025-08-29

## 2025-07-29 NOTE — TELEPHONE ENCOUNTER
Eleazar ROSALES [place] called to request a refill on his medication.      Last office visit : 7/14/2025   Next office visit : 10/17/2025     Last UDS:   Benzodiazepine Screen, Urine   Date Value Ref Range Status   04/14/2025 pos  Final     Buprenorphine Urine   Date Value Ref Range Status   04/14/2025 neg  Final     Cocaine Metabolite Screen, Urine   Date Value Ref Range Status   04/14/2025 neg  Final     Gabapentin Screen, Urine   Date Value Ref Range Status   04/14/2025 na  Final     Oxycodone Screen, Ur   Date Value Ref Range Status   04/14/2025 pos  Final     Propoxyphene Screen, Urine   Date Value Ref Range Status   04/14/2025 neg  Final     THC Screen, Urine   Date Value Ref Range Status   04/14/2025 neg  Final     Tricyclic Antidepressants, Urine   Date Value Ref Range Status   04/14/2025 neg  Final       Last Bryan: 6/30/2025  Medication Contract: 6/28/2025    Requested Prescriptions     Pending Prescriptions Disp Refills    LORazepam (ATIVAN) 0.5 MG tablet [Pharmacy Med Name: LORAZEPAM 0.5MG TABLET] 90 tablet 0     Sig: TAKE ONE (1) TABLET BY MOUTH EVERY 8 HOURS AS NEEDED FOR ANXIETY    diazePAM (VALIUM) 10 MG tablet [Pharmacy Med Name: DIAZEPAM 10MG TABLET] 60 tablet 0     Sig: TAKE ONE (1) TABLET BY MOUTH TWO (2) TIMES DAILY AS NEEDED FOR ANXIETY FOR UP TO 30 DAYS. MAX DAILY AMOUNT: 20 MG         Please approve or refuse this medication.   Raffy Carroll MA

## 2025-07-31 ENCOUNTER — TELEPHONE (OUTPATIENT)
Dept: NEUROSURGERY | Facility: CLINIC | Age: 55
End: 2025-07-31

## 2025-07-31 NOTE — TELEPHONE ENCOUNTER
I was reviewing chart and spoke with pt wife pt did not have his EMG study due to them paying out of pocket. Pt wife states the test was like 7,000 she wanted to keep the appt to discuss the test with Dr. Mason. I spoke with ARTEMIO Odell he states He needs the EMG nerve conduction study before he sees Marlon. I informed patient wife she verbalized understanding.

## 2025-08-15 DIAGNOSIS — G89.29 CHRONIC LOW BACK PAIN, UNSPECIFIED BACK PAIN LATERALITY, UNSPECIFIED WHETHER SCIATICA PRESENT: ICD-10-CM

## 2025-08-15 DIAGNOSIS — M54.50 CHRONIC LOW BACK PAIN, UNSPECIFIED BACK PAIN LATERALITY, UNSPECIFIED WHETHER SCIATICA PRESENT: ICD-10-CM

## 2025-08-15 RX ORDER — OXYCODONE AND ACETAMINOPHEN 10; 325 MG/1; MG/1
1 TABLET ORAL EVERY 6 HOURS PRN
Qty: 120 TABLET | Refills: 0 | Status: SHIPPED | OUTPATIENT
Start: 2025-08-17 | End: 2025-09-16

## 2025-08-26 DIAGNOSIS — F41.9 ANXIETY: ICD-10-CM

## 2025-08-26 DIAGNOSIS — F41.9 ANXIETY DISORDER, UNSPECIFIED TYPE: ICD-10-CM

## 2025-08-26 RX ORDER — DIAZEPAM 10 MG/1
TABLET ORAL
Qty: 60 TABLET | Refills: 0 | Status: SHIPPED | OUTPATIENT
Start: 2025-08-29 | End: 2025-09-28

## 2025-08-26 RX ORDER — LORAZEPAM 0.5 MG/1
TABLET ORAL
Qty: 90 TABLET | Refills: 0 | Status: SHIPPED | OUTPATIENT
Start: 2025-08-29 | End: 2025-09-29

## (undated) DEVICE — ENDO KIT,LOURDES HOSPITAL: Brand: MEDLINE INDUSTRIES, INC.